# Patient Record
Sex: FEMALE | Race: WHITE | Employment: UNEMPLOYED | URBAN - NONMETROPOLITAN AREA
[De-identification: names, ages, dates, MRNs, and addresses within clinical notes are randomized per-mention and may not be internally consistent; named-entity substitution may affect disease eponyms.]

---

## 2017-01-08 ENCOUNTER — HOSPITAL ENCOUNTER (EMERGENCY)
Facility: HOSPITAL | Age: 22
Discharge: HOME | End: 2017-01-09
Admitting: INTERNAL MEDICINE
Payer: COMMERCIAL

## 2017-01-08 VITALS
SYSTOLIC BLOOD PRESSURE: 113 MMHG | TEMPERATURE: 98.4 F | OXYGEN SATURATION: 99 % | HEIGHT: 62 IN | DIASTOLIC BLOOD PRESSURE: 65 MMHG | RESPIRATION RATE: 16 BRPM

## 2017-01-08 DIAGNOSIS — L27.0 ALLERGIC DRUG RASH: ICD-10-CM

## 2017-01-08 PROCEDURE — 99283 EMERGENCY DEPT VISIT LOW MDM: CPT | Performed by: INTERNAL MEDICINE

## 2017-01-08 PROCEDURE — 99283 EMERGENCY DEPT VISIT LOW MDM: CPT

## 2017-01-08 NOTE — ED AVS SNAPSHOT
[facility]    [address] [address]    Phone:  [phone]                                       Gerald Evans   MRN: [id number]    Department:  [facility]   Date of Visit:  1/8/2017           After Visit Summary Signature Page     I have received my discharge instructions, and my questions have been answered. I have discussed any challenges I see with this plan with the nurse or doctor.    ..........................................................................................................................................  Patient/Patient Representative Signature      ..........................................................................................................................................  Patient Representative Print Name and Relationship to Patient    ..................................................               ................................................  Date                                            Time    ..........................................................................................................................................  Reviewed by Signature/Title    ...................................................              ..............................................  Date                                                            Time

## 2017-01-08 NOTE — ED AVS SNAPSHOT
[facility]    [address] [place] [address]    [facility] [place] [address]    Phone:  [phone]                                       Gerald Evans   MRN: [id number]    Department:  [facility]   Date of Visit:  1/8/2017           Patient Information     Date Of Birth          1995        Your diagnoses for this visit were:     Allergic drug rash       Follow-up Information     Follow up with Jennie Carter NP.    Specialty:  Nurse Practitioner    Contact information:    [facility]  [address] [facility] [address]  [phone]          Discharge Instructions           Self-Care for Skin Rashes  When your skin reacts to a substance your body is sensitive to, it can cause a rash. You can treat most rashes at home by keeping the skin clean and dry. Many rashes may get better on their own within 2 to 3 days. You may need medical attention if your rash itches, drains, or hurts, particularly if the rash is getting worse.  What can cause a skin rash?    Sun poisoning, caused by too much exposure to the sun    An irritant or allergic reaction to a certain type of food, plant, or chemical, such as  shellfish, poison ivy, and or cleaning products    An infection caused by a fungus (ringworm), virus (chickenpox), or bacteria (strep)    Bites or infestation caused by insects or pests, such as ticks, lice, or mites    Dry skin, which is often seen during the winter months and in older people  How can I control itching and skin damage?    Take soothing  lukewarm baths in a colloidal oatmeal product. You can buy this at the drugstore.    Do your best not to scratch. Clip fingernails short, especially in young children, to reduce skin damage if scratching does occur.    Use moisturizing skin lotion instead of scratching your dry skin.    Use sunscreen whenever going out into direct sun.    Use only mild cleansing agents whenever possible.    Wash with mild, nonirritating soap and warm  water.    Wear clothing that breathes, such as cotton shirts or canvas shoes.    If fluid is seeping from the rash, cover it loosely with clean gauze to absorb the discharge.    Many rashes are contagious. Prevent the rash from spreading to others by washing your hands often before or after touching others with any skin rash.  Use medicine    Antihistamines such as diphenhydramine can help control itching. But use with caution because they can make you drowsy.    Using over-the-counter hydrocortisone cream on small rashes may help reduce swelling and itching    Most over-the-counter antifungal medicines can treat athlete s foot and many other fungal infections of the skin.  Check with your healthcare provider  Call your healthcare provider if:    You were told that you have a fungal infection on your skin to make sure you have the correct type of medicine    You have questions or concerns about medicines or their side effects.      Call 911  Call 911 if either of these occur:    Your tongue or lips start to swell    You have difficulty breathing      Call your healthcare provider  Call your healthcare provider if any of these occur:    Temperature  of more than 101.0 F (38.3 C), or as directed    Sore throat, a cough, or unusual fatigue    Red, oozy, or painful rash gets worse. These are signs of infection.    Rash covers your face, genitals, or most of your body    Crusty sores or red rings that begin to spread    You were exposed to someone who has a contagious rash, such as scabies or lice.    Red bull s-eye rash with a white center (a sign of Lyme disease)    You were told that you have resistant bacteria (MRSA) on your skin.     8561-3932 [employer]. [address] [place] [address], [place] [address]. All rights reserved. This information is not intended as a substitute for professional medical care. Always follow your healthcare professional's instructions.               Review of your medicines      Our  "records show that you are taking the medicines listed below. If these are incorrect, please call your family doctor or clinic.        Dose / Directions Last dose taken    BUSPAR PO   Dose:  5 mg        Take 5 mg by mouth 2 times daily   Refills:  0        IBUPROFEN PO        Refills:  0        TYLENOL PO        Refills:  0                Orders Needing Specimen Collection     None      Pending Results     No orders found for last 2 day(s).            Pending Culture Results     No orders found for last 2 day(s).            Thank you for choosing [facility]       Thank you for choosing [facility] for your care. Our goal is always to provide you with excellent care. Hearing back from our patients is one way we can continue to improve our services. Please take a few minutes to complete the written survey that you may receive in the mail after you visit with us. Thank you!        [employer] Information     [employer] lets you send messages to your doctor, view your test results, renew your prescriptions, schedule appointments and more. To sign up, go to [email or web address] . Click on \"Log in\" on the left side of the screen, which will take you to the Welcome page. Then click on \"Sign up Now\" on the right side of the page.     You will be asked to enter the access code listed below, as well as some personal information. Please follow the directions to create your username and password.     Your access code is: [email or web address]  Expires: 3/31/2017  3:25 AM     Your access code will  in 90 days. If you need help or a new code, please call your [facility] clinic or [phone].        Care EveryWhere ID     This is your Care EveryWhere ID. This could be used by other organizations to access your [facility] medical records  [id number]        After Visit Summary       This is your record. Keep this with you and show to your community pharmacist(s) and doctor(s) at your next visit.                  "

## 2017-01-09 PROCEDURE — 25000130 H RX MED GY IP 250 OP 259 PS 637: Performed by: INTERNAL MEDICINE

## 2017-01-09 PROCEDURE — 25000132 ZZH RX MED GY IP 250 OP 250 PS 637: Performed by: INTERNAL MEDICINE

## 2017-01-09 RX ORDER — FAMOTIDINE 20 MG/1
20 TABLET, FILM COATED ORAL ONCE
Status: COMPLETED | OUTPATIENT
Start: 2017-01-09 | End: 2017-01-09

## 2017-01-09 RX ORDER — DIPHENHYDRAMINE HCL 25 MG
50 CAPSULE ORAL ONCE
Status: COMPLETED | OUTPATIENT
Start: 2017-01-09 | End: 2017-01-09

## 2017-01-09 RX ADMIN — FAMOTIDINE 20 MG: 20 TABLET ORAL at 00:05

## 2017-01-09 RX ADMIN — DIPHENHYDRAMINE HYDROCHLORIDE 50 MG: 25 CAPSULE ORAL at 00:05

## 2017-01-09 NOTE — ED NOTES
Pt states she was started on buspar on Wednesday for anxiety issues. Stopped th Buspar after Friday because of rash. Pt c/o feeling anxious still.

## 2017-01-09 NOTE — DISCHARGE INSTRUCTIONS
Self-Care for Skin Rashes  When your skin reacts to a substance your body is sensitive to, it can cause a rash. You can treat most rashes at home by keeping the skin clean and dry. Many rashes may get better on their own within 2 to 3 days. You may need medical attention if your rash itches, drains, or hurts, particularly if the rash is getting worse.  What can cause a skin rash?    Sun poisoning, caused by too much exposure to the sun    An irritant or allergic reaction to a certain type of food, plant, or chemical, such as  shellfish, poison ivy, and or cleaning products    An infection caused by a fungus (ringworm), virus (chickenpox), or bacteria (strep)    Bites or infestation caused by insects or pests, such as ticks, lice, or mites    Dry skin, which is often seen during the winter months and in older people  How can I control itching and skin damage?    Take soothing  lukewarm baths in a colloidal oatmeal product. You can buy this at the [employer].    Do your best not to scratch. Clip fingernails short, especially in young children, to reduce skin damage if scratching does occur.    Use moisturizing skin lotion instead of scratching your dry skin.    Use sunscreen whenever going out into direct sun.    Use only mild cleansing agents whenever possible.    Wash with mild, nonirritating soap and warm water.    Wear clothing that breathes, such as cotton shirts or canvas shoes.    If fluid is seeping from the rash, cover it loosely with clean gauze to absorb the discharge.    Many rashes are contagious. Prevent the rash from spreading to others by washing your hands often before or after touching others with any skin rash.  Use medicine    Antihistamines such as diphenhydramine can help control itching. But use with caution because they can make you drowsy.    Using over-the-counter hydrocortisone cream on small rashes may help reduce swelling and itching    Most over-the-counter antifungal medicines can  treat athlete s foot and many other fungal infections of the skin.  Check with your healthcare provider  Call your healthcare provider if:    You were told that you have a fungal infection on your skin to make sure you have the correct type of medicine    You have questions or concerns about medicines or their side effects.      Call 911  Call 911 if either of these occur:    Your tongue or lips start to swell    You have difficulty breathing      Call your healthcare provider  Call your healthcare provider if any of these occur:    Temperature  of more than 101.0 F (38.3 C), or as directed    Sore throat, a cough, or unusual fatigue    Red, oozy, or painful rash gets worse. These are signs of infection.    Rash covers your face, genitals, or most of your body    Crusty sores or red rings that begin to spread    You were exposed to someone who has a contagious rash, such as scabies or lice.    Red bull s-eye rash with a white center (a sign of Lyme disease)    You were told that you have resistant bacteria (MRSA) on your skin.     [id number] [employer]. [address] [place] [address], [place] [address]. All rights reserved. This information is not intended as a substitute for professional medical care. Always follow your healthcare professional's instructions.

## 2017-01-10 ASSESSMENT — ENCOUNTER SYMPTOMS
MYALGIAS: 0
VOICE CHANGE: 0
CONFUSION: 0
HEMATURIA: 0
COLOR CHANGE: 0
PALPITATIONS: 0
FEVER: 0
DYSURIA: 0
VOMITING: 0
NAUSEA: 0
NECK STIFFNESS: 0
CHILLS: 0
HEADACHES: 0
ABDOMINAL DISTENTION: 0
CHEST TIGHTNESS: 0
DIZZINESS: 0
NUMBNESS: 0
WOUND: 0
BLOOD IN STOOL: 0
WHEEZING: 0
ABDOMINAL PAIN: 0
FLANK PAIN: 0
DIAPHORESIS: 0
ANAL BLEEDING: 0
COUGH: 0
SHORTNESS OF BREATH: 0
BACK PAIN: 0
ARTHRALGIAS: 0
LIGHT-HEADEDNESS: 0
NECK PAIN: 0

## 2017-01-11 NOTE — ED PROVIDER NOTES
"  History     Chief Complaint   Patient presents with     Rash     Started Wednesday. Pt started on buspar on Wednesday, stopped taking buspar on 1/7/2016.     Anxiety     Patient is a 21 year old female presenting with rash. The history is provided by the patient.   Rash  Location:  Full body  Quality: itchiness    Onset quality:  Gradual  Timing:  Constant  Chronicity:  New  Associated symptoms: no abdominal pain, no fever, no headaches, no joint pain, no myalgias, no nausea, no shortness of breath, not vomiting and not wheezing        I have reviewed the Medications, Allergies, Past Medical and Surgical History, and Social History in the Epic system.    Review of Systems   Constitutional: Negative for fever, chills and diaphoresis.   HENT: Negative for voice change.    Eyes: Negative for visual disturbance.   Respiratory: Negative for cough, chest tightness, shortness of breath and wheezing.    Cardiovascular: Negative for chest pain, palpitations and leg swelling.   Gastrointestinal: Negative for nausea, vomiting, abdominal pain, blood in stool, abdominal distention and anal bleeding.   Genitourinary: Negative for dysuria, hematuria, flank pain and decreased urine volume.   Musculoskeletal: Negative for myalgias, back pain, arthralgias, gait problem, neck pain and neck stiffness.   Skin: Positive for rash. Negative for color change, pallor and wound.   Neurological: Negative for dizziness, syncope, light-headedness, numbness and headaches.   Psychiatric/Behavioral: Negative for suicidal ideas and confusion.       Physical Exam   BP: 113/65 mmHg  Heart Rate: 88  Temp: 98.4  F (36.9  C)  Resp: 16  Height: 157.5 cm (5' 2\")  SpO2: 99 %  Physical Exam   Constitutional: She is oriented to person, place, and time. She appears well-developed and well-nourished.   HENT:   Head: Normocephalic and atraumatic.   Mouth/Throat: No oropharyngeal exudate.   Eyes: Conjunctivae are normal. Pupils are equal, round, and reactive to " light.   Neck: Normal range of motion. Neck supple. No JVD present. No tracheal deviation present. No thyromegaly present.   Cardiovascular: Normal rate, regular rhythm, normal heart sounds and intact distal pulses.  Exam reveals no gallop and no friction rub.    No murmur heard.  Pulmonary/Chest: Effort normal and breath sounds normal. No stridor. No respiratory distress. She has no wheezes. She has no rales. She exhibits no tenderness.   Abdominal: Soft. Bowel sounds are normal. She exhibits no distension and no mass. There is no tenderness. There is no rebound and no guarding.   Musculoskeletal: Normal range of motion. She exhibits no edema or tenderness.   Lymphadenopathy:     She has no cervical adenopathy.   Neurological: She is alert and oriented to person, place, and time.   Skin: Skin is warm and dry. No rash noted. Rash is not macular. No erythema. No pallor.   Psychiatric: Her behavior is normal.   Nursing note and vitals reviewed.      ED Course   Procedures               Labs Ordered and Resulted from Time of ED Arrival Up to the Time of Departure from the ED - No data to display    Assessments & Plan (with Medical Decision Making)   Scattered macular rash after buspar   Already resolved  Pt advised stop taking buspar, fu with PCP  I have reviewed the nursing notes.    I have reviewed the findings, diagnosis, plan and need for follow up with the patient.    Discharge Medication List as of 1/9/2017 12:16 AM          Final diagnoses:   Allergic drug rash       1/8/2017   [facility]      Aj Mendes MD  01/10/17 9924

## 2017-01-31 ENCOUNTER — HISTORY (OUTPATIENT)
Dept: EMERGENCY MEDICINE | Facility: OTHER | Age: 22
End: 2017-01-31

## 2017-05-25 ENCOUNTER — HOSPITAL ENCOUNTER (EMERGENCY)
Facility: HOSPITAL | Age: 22
Discharge: HOME | End: 2017-05-25
Attending: EMERGENCY MEDICINE | Admitting: EMERGENCY MEDICINE
Payer: COMMERCIAL

## 2017-05-25 VITALS
TEMPERATURE: 98.3 F | SYSTOLIC BLOOD PRESSURE: 111 MMHG | HEART RATE: 85 BPM | DIASTOLIC BLOOD PRESSURE: 73 MMHG | OXYGEN SATURATION: 98 % | RESPIRATION RATE: 16 BRPM

## 2017-05-25 DIAGNOSIS — R10.9 CHRONIC ABDOMINAL PAIN: ICD-10-CM

## 2017-05-25 DIAGNOSIS — G89.29 CHRONIC ABDOMINAL PAIN: ICD-10-CM

## 2017-05-25 LAB
ALBUMIN SERPL-MCNC: 3.8 G/DL (ref 3.4–5)
ALBUMIN UR-MCNC: NEGATIVE MG/DL
ALP SERPL-CCNC: 78 U/L (ref 40–150)
ALT SERPL W P-5'-P-CCNC: 12 U/L (ref 0–50)
ANION GAP SERPL CALCULATED.3IONS-SCNC: 7 MMOL/L (ref 3–14)
APPEARANCE UR: CLEAR
AST SERPL W P-5'-P-CCNC: 7 U/L (ref 0–45)
BASOPHILS # BLD AUTO: 0 10E9/L (ref 0–0.2)
BASOPHILS NFR BLD AUTO: 0.2 %
BILIRUB SERPL-MCNC: 0.2 MG/DL (ref 0.2–1.3)
BILIRUB UR QL STRIP: NEGATIVE
BUN SERPL-MCNC: 11 MG/DL (ref 7–30)
CALCIUM SERPL-MCNC: 8.7 MG/DL (ref 8.5–10.1)
CHLORIDE SERPL-SCNC: 107 MMOL/L (ref 94–109)
CO2 SERPL-SCNC: 27 MMOL/L (ref 20–32)
COLOR UR AUTO: YELLOW
CREAT SERPL-MCNC: 0.68 MG/DL (ref 0.52–1.04)
DIFFERENTIAL METHOD BLD: NORMAL
EOSINOPHIL # BLD AUTO: 0.1 10E9/L (ref 0–0.7)
EOSINOPHIL NFR BLD AUTO: 0.6 %
ERYTHROCYTE [DISTWIDTH] IN BLOOD BY AUTOMATED COUNT: 13.5 % (ref 10–15)
GFR SERPL CREATININE-BSD FRML MDRD: NORMAL ML/MIN/1.7M2
GLUCOSE SERPL-MCNC: 87 MG/DL (ref 70–99)
GLUCOSE UR STRIP-MCNC: NEGATIVE MG/DL
HCT VFR BLD AUTO: 37 % (ref 35–47)
HGB BLD-MCNC: 12.2 G/DL (ref 11.7–15.7)
HGB UR QL STRIP: NEGATIVE
IMM GRANULOCYTES # BLD: 0 10E9/L (ref 0–0.4)
IMM GRANULOCYTES NFR BLD: 0.2 %
KETONES UR STRIP-MCNC: NEGATIVE MG/DL
LEUKOCYTE ESTERASE UR QL STRIP: NEGATIVE
LYMPHOCYTES # BLD AUTO: 2.8 10E9/L (ref 0.8–5.3)
LYMPHOCYTES NFR BLD AUTO: 34 %
MCH RBC QN AUTO: 27.8 PG (ref 26.5–33)
MCHC RBC AUTO-ENTMCNC: 33 G/DL (ref 31.5–36.5)
MCV RBC AUTO: 84 FL (ref 78–100)
MONOCYTES # BLD AUTO: 0.5 10E9/L (ref 0–1.3)
MONOCYTES NFR BLD AUTO: 6.4 %
NEUTROPHILS # BLD AUTO: 4.8 10E9/L (ref 1.6–8.3)
NEUTROPHILS NFR BLD AUTO: 58.6 %
NITRATE UR QL: NEGATIVE
NRBC # BLD AUTO: 0 10*3/UL
NRBC BLD AUTO-RTO: 0 /100
PH UR STRIP: 6 PH (ref 4.7–8)
PLATELET # BLD AUTO: 293 10E9/L (ref 150–450)
POTASSIUM SERPL-SCNC: 3.5 MMOL/L (ref 3.4–5.3)
PROT SERPL-MCNC: 7.1 G/DL (ref 6.8–8.8)
RBC # BLD AUTO: 4.39 10E12/L (ref 3.8–5.2)
SODIUM SERPL-SCNC: 141 MMOL/L (ref 133–144)
SP GR UR STRIP: 1.02 (ref 1–1.03)
URN SPEC COLLECT METH UR: NORMAL
UROBILINOGEN UR STRIP-MCNC: NORMAL MG/DL (ref 0–2)
WBC # BLD AUTO: 8.1 10E9/L (ref 4–11)

## 2017-05-25 PROCEDURE — 80053 COMPREHEN METABOLIC PANEL: CPT | Performed by: EMERGENCY MEDICINE

## 2017-05-25 PROCEDURE — 99284 EMERGENCY DEPT VISIT MOD MDM: CPT | Performed by: EMERGENCY MEDICINE

## 2017-05-25 PROCEDURE — 99283 EMERGENCY DEPT VISIT LOW MDM: CPT

## 2017-05-25 PROCEDURE — 36415 COLL VENOUS BLD VENIPUNCTURE: CPT | Performed by: EMERGENCY MEDICINE

## 2017-05-25 PROCEDURE — 81003 URINALYSIS AUTO W/O SCOPE: CPT | Performed by: EMERGENCY MEDICINE

## 2017-05-25 PROCEDURE — 85025 COMPLETE CBC W/AUTO DIFF WBC: CPT | Performed by: EMERGENCY MEDICINE

## 2017-05-25 RX ORDER — DICYCLOMINE HCL 20 MG
20 TABLET ORAL 2 TIMES DAILY
Qty: 20 TABLET | Refills: 0 | Status: SHIPPED | OUTPATIENT
Start: 2017-05-25 | End: 2017-06-04

## 2017-05-25 NOTE — ED PROVIDER NOTES
History     Chief Complaint   Patient presents with     Abdominal Pain     belly pain on/of for 5 years, worse tonight     The history is provided by the patient.     Gerald Evans is a 21 year old female who presents to the emergency department accompanied by the boyfriend.  She has had recurrent abdominal pains on and off over the last 5 years.  The pains are generalized, especially worse after eating greasy food.  Sometimes associated with diarrhea.  Denies any vomiting, fever, weight loss or night sweats.  The abdominal discomfort is also sometimes present when patient is under a lot of stress.  Patient suspects that she has irritable bowel syndrome.  She denies any family history of inflammatory bowel disease.    I have reviewed the Medications, Allergies, Past Medical and Surgical History, and Social History in the Epic system.    Review of Systems   All other systems reviewed and are negative.      Physical Exam   BP: 111/73  Pulse: 85  Temp: 98.3  F (36.8  C)  Resp: 16  SpO2: 98 %  Physical Exam   Constitutional: She is oriented to person, place, and time. She appears well-developed and well-nourished. No distress.   HENT:   Head: Atraumatic.   Mouth/Throat: Oropharynx is clear and moist. No oropharyngeal exudate.   Eyes: Pupils are equal, round, and reactive to light. No scleral icterus.   Neck: Normal range of motion. Neck supple.   Cardiovascular: Normal rate, regular rhythm, normal heart sounds and intact distal pulses.    Pulmonary/Chest: Breath sounds normal. No respiratory distress. She has no wheezes. She has no rales.   Abdominal: Soft. Bowel sounds are normal. She exhibits no mass. There is no tenderness. There is no rebound and no guarding.   Musculoskeletal: She exhibits no edema or tenderness.   Neurological: She is alert and oriented to person, place, and time.   Skin: Skin is warm. No rash noted. She is not diaphoretic.   Nursing note and vitals reviewed.      ED Course   Normal CBC, CMP  and urinalysis.  Stable ED course.    ED Course     Procedures         Labs Ordered and Resulted from Time of ED Arrival Up to the Time of Departure from the ED   UA MACROSCOPIC WITH REFLEX TO MICRO AND CULTURE   CBC WITH PLATELETS DIFFERENTIAL   COMPREHENSIVE METABOLIC PANEL       Assessments & Plan (with Medical Decision Making)     I have reviewed the nursing notes.    I have reviewed the findings, diagnosis, plan and need for follow up with the patient.    New Prescriptions    DICYCLOMINE (BENTYL) 20 MG TABLET    Take 1 tablet (20 mg) by mouth 2 times daily for 10 days       Final diagnoses:   Chronic abdominal pain   Long history of recurrent generalized abdominal pain that is worsened with greasy food.  Today's evaluation at the ED showed normal abdominal exam, normal CBC, CMP and urinalysis.  Patient discharged home.  Advised follow-up with PCP.  May need EGD and colonoscopy to rule out inflammatory bowel disease.  Most likely patient has irritable bowel disease but this is a diagnosis of exclusion.  Advised patient that the best way to proceed from now would be to do EGD and colonoscopy.  Patient is not really interested in endoscopy.  She will discuss this with her PCP.    5/25/2017   [facility]     Fredy Oneil MD  05/25/17 0212       Fredy Oneil MD  05/25/17 0213

## 2017-05-25 NOTE — ED AVS SNAPSHOT
[facility]    [address] [address]    Phone:  [phone]                                       Gerald Evans   MRN: [id number]    Department:  [facility]   Date of Visit:  5/25/2017           Patient Information     Date Of Birth          1995        Your diagnoses for this visit were:     Chronic abdominal pain        You were seen by Fredy Oneil MD.      Follow-up Information     Schedule an appointment as soon as possible for a visit with Jennie Carter NP.    Specialty:  Nurse Practitioner    Why:  Re-evaluation    Contact information:    [facility]  [address] [facility] [address] [phone]          Discharge Instructions         Abdominal Pain  Abdominal pain is pain in the stomach or intestinal area. Everyone has this pain from time to time. In many cases it goes away on its own. But abdominal pain can sometimes be due to a serious problem, such as appendicitis. So it s important to know when to seek help.  Causes of abdominal pain  There are many possible causes of abdominal pain. Common causes in adults include:    Constipation, diarrhea, or gas    GERD (gastroesophageal reflux disease) movement of stomach acid into the esophagus, also known as acid reflux or heartburn    Peptic ulcer (a sore in the lining of the stomach or small intestine)    Inflammation of the gallbladder, liver, or pancreas    Gallstones or kidney stones    Appendicitis     Obstruction of the intestines     Hernia (bulging of an internal organ through a muscle or other tissue)    Urinary tract infections    In women, menstrual cramps, fibroids, or endometriosis of the uterus    Inflammation or infection of the intestines  Diagnosing the cause of abdominal pain  Your health care provider will examine you to help find the cause of your pain. If needed, tests will be ordered. Because abdominal pain has so many possible causes, it can be hard to  discover the reason for the pain. Giving details about your pain can help. Be ready to tell your health care provider where and when you feel the pain and what makes it better or worse. Also mention whether you have other symptoms such as fever, tiredness, nausea, vomiting, or changes in bathroom habits.  Treating abdominal pain  Certain causes of pain, such as appendicitis or a bowel obstruction, need emergency treatment. Other problems can be treated with rest, fluids, or medications. Your health care provider can give you specific instructions for treatment or self-care based on the cause of your pain.  If you have vomiting or diarrhea, sip water or other clear fluids. When you are ready to eat solid foods again, start with small amounts of easy-to-digest, low-fat foods, such as applesauce, toast, or crackers.   When to call the doctor  Call 911 or go to the hospital right away if you:    Can t pass stool and are vomiting    Are vomiting blood or have black, tarry diarrhea    Also have chest, neck, or shoulder pain    Feel like you are about to pass out    Have pain in your shoulder blades with nausea    Have sudden, excruciating abdominal pain    Have new, severe pain unlike any you have felt before    Have a belly that is rigid, hard, and tender to touch  Call your doctor if you have:    Pain for more than 5 days    Bloating for more than 2 days    Diarrhea for more than 5 days    Fever of 101 F (38.3 C) or higher    Pain that continues to worsen    Unexplained weight loss    Continued lack of appetite    Blood in the stool  How to prevent abdominal pain  Here are some tips to help prevent abdominal pain:    Eat smaller amounts of food at one time.    Avoid greasy, fried, or other high-fat foods.    Avoid foods that give you gas.    Exercise regularly.    Drink plenty of fluids.  To help prevent symptoms of gastroesophageal reflux disease (GERD):    Quit smoking.    Reduce alcohol and certain foods that  increase stomach acid.     Lose excess weight.    Finish eating at least 2 hours before you go to bed or lie down.    Elevate the head of your bed.    9182-7772 [employer]. [address] [place] [address], [place] [address]. All rights reserved. This information is not intended as a substitute for professional medical care. Always follow your healthcare professional's instructions.             Review of your medicines      START taking        Dose / Directions Last dose taken    dicyclomine 20 MG tablet   Commonly known as:  BENTYL   Dose:  20 mg   Quantity:  20 tablet        Take 1 tablet (20 mg) by mouth 2 times daily for 10 days   Refills:  0          Our records show that you are taking the medicines listed below. If these are incorrect, please call your family doctor or clinic.        Dose / Directions Last dose taken    IBUPROFEN PO        Refills:  0        TYLENOL PO        Refills:  0                Prescriptions were sent or printed at these locations (1 Prescription)                   [facility]  [address]   [address] [facility] [place] [address]    Telephone:  [phone]   Fax:  [phone]   Hours:                  E-Prescribed (1 of 1)         dicyclomine (BENTYL) 20 MG tablet                Procedures and tests performed during your visit     CBC with platelets differential    Comprehensive metabolic panel    UA reflex to Microscopic and Culture      Orders Needing Specimen Collection     None      Pending Results     Date and Time Order Name Status Description    5/25/2017 0133 Comprehensive metabolic panel In process             Pending Culture Results     No orders found from 5/23/2017 to 5/26/2017.            Thank you for choosing [facility]       Thank you for choosing [facility] for your care. Our goal is always to provide you with excellent care. Hearing back from our patients is one way we can continue to improve our services. Please take a few minutes to  "complete the written survey that you may receive in the mail after you visit with us. Thank you!        [facility] Information     [employer] lets you send messages to your doctor, view your test results, renew your prescriptions, schedule appointments and more. To sign up, go to [email or web address] . Click on \"Log in\" on the left side of the screen, which will take you to the Welcome page. Then click on \"Sign up Now\" on the right side of the page.     You will be asked to enter the access code listed below, as well as some personal information. Please follow the directions to create your username and password.     Your access code is: [email or web address]  Expires: 2017  2:11 AM     Your access code will  in 90 days. If you need help or a new code, please call your [facility] clinic or [phone].        Care EveryWhere ID     This is your Care EveryWhere ID. This could be used by other organizations to access your [facility] medical records  [id number]        After Visit Summary       This is your record. Keep this with you and show to your community pharmacist(s) and doctor(s) at your next visit.                  "

## 2017-05-25 NOTE — ED AVS SNAPSHOT
[facility]    [address] [address]    Phone:  [phone]                                       Gerald Evans   MRN: [id number]    Department:  [facility]   Date of Visit:  5/25/2017           After Visit Summary Signature Page     I have received my discharge instructions, and my questions have been answered. I have discussed any challenges I see with this plan with the nurse or doctor.    ..........................................................................................................................................  Patient/Patient Representative Signature      ..........................................................................................................................................  Patient Representative Print Name and Relationship to Patient    ..................................................               ................................................  Date                                            Time    ..........................................................................................................................................  Reviewed by Signature/Title    ...................................................              ..............................................  Date                                                            Time

## 2017-05-25 NOTE — ED NOTES
Discharge instructions reviewed with patient, and Rx sent to [facility]. Pt verbalized understanding. Pt ambulated with a steady gait to the exit.

## 2017-05-25 NOTE — DISCHARGE INSTRUCTIONS
Abdominal Pain  Abdominal pain is pain in the stomach or intestinal area. Everyone has this pain from time to time. In many cases it goes away on its own. But abdominal pain can sometimes be due to a serious problem, such as appendicitis. So it s important to know when to seek help.  Causes of abdominal pain  There are many possible causes of abdominal pain. Common causes in adults include:    Constipation, diarrhea, or gas    GERD (gastroesophageal reflux disease) movement of stomach acid into the esophagus, also known as acid reflux or heartburn    Peptic ulcer (a sore in the lining of the stomach or small intestine)    Inflammation of the gallbladder, liver, or pancreas    Gallstones or kidney stones    Appendicitis     Obstruction of the intestines     Hernia (bulging of an internal organ through a muscle or other tissue)    Urinary tract infections    In women, menstrual cramps, fibroids, or endometriosis of the uterus    Inflammation or infection of the intestines  Diagnosing the cause of abdominal pain  Your health care provider will examine you to help find the cause of your pain. If needed, tests will be ordered. Because abdominal pain has so many possible causes, it can be hard to discover the reason for the pain. Giving details about your pain can help. Be ready to tell your health care provider where and when you feel the pain and what makes it better or worse. Also mention whether you have other symptoms such as fever, tiredness, nausea, vomiting, or changes in bathroom habits.  Treating abdominal pain  Certain causes of pain, such as appendicitis or a bowel obstruction, need emergency treatment. Other problems can be treated with rest, fluids, or medications. Your health care provider can give you specific instructions for treatment or self-care based on the cause of your pain.  If you have vomiting or diarrhea, sip water or other clear fluids. When you are ready to eat solid foods again, start with  small amounts of easy-to-digest, low-fat foods, such as applesauce, toast, or crackers.   When to call the doctor  Call 911 or go to the hospital right away if you:    Can t pass stool and are vomiting    Are vomiting blood or have black, tarry diarrhea    Also have chest, neck, or shoulder pain    Feel like you are about to pass out    Have pain in your shoulder blades with nausea    Have sudden, excruciating abdominal pain    Have new, severe pain unlike any you have felt before    Have a belly that is rigid, hard, and tender to touch  Call your doctor if you have:    Pain for more than 5 days    Bloating for more than 2 days    Diarrhea for more than 5 days    Fever of 101 F (38.3 C) or higher    Pain that continues to worsen    Unexplained weight loss    Continued lack of appetite    Blood in the stool  How to prevent abdominal pain  Here are some tips to help prevent abdominal pain:    Eat smaller amounts of food at one time.    Avoid greasy, fried, or other high-fat foods.    Avoid foods that give you gas.    Exercise regularly.    Drink plenty of fluids.  To help prevent symptoms of gastroesophageal reflux disease (GERD):    Quit smoking.    Reduce alcohol and certain foods that increase stomach acid.     Lose excess weight.    Finish eating at least 2 hours before you go to bed or lie down.    Elevate the head of your bed.    2754-9763 [employer]. [address] [place] [address], [place] [address]. All rights reserved. This information is not intended as a substitute for professional medical care. Always follow your healthcare professional's instructions.

## 2017-05-25 NOTE — ED NOTES
"Pt comes in with report of abdominal pain for past 5 years.  Pt states worse after eating \"greasy food\".  Pt states diarrhea frequently.  Pt declines IV placement.  Pt rates pain 4-8/10.    "

## 2017-06-18 ENCOUNTER — HOSPITAL ENCOUNTER (EMERGENCY)
Facility: HOSPITAL | Age: 22
Discharge: LEFT BEFORE DISCHARGE | End: 2017-06-18
Attending: FAMILY MEDICINE | Admitting: FAMILY MEDICINE
Payer: COMMERCIAL

## 2017-06-18 VITALS
RESPIRATION RATE: 18 BRPM | TEMPERATURE: 96.8 F | WEIGHT: 108 LBS | DIASTOLIC BLOOD PRESSURE: 71 MMHG | SYSTOLIC BLOOD PRESSURE: 106 MMHG | BODY MASS INDEX: 19.75 KG/M2 | OXYGEN SATURATION: 100 % | HEART RATE: 77 BPM

## 2017-06-18 DIAGNOSIS — R10.32 ABDOMINAL PAIN, LEFT LOWER QUADRANT: ICD-10-CM

## 2017-06-18 PROCEDURE — 99282 EMERGENCY DEPT VISIT SF MDM: CPT

## 2017-06-18 PROCEDURE — 99283 EMERGENCY DEPT VISIT LOW MDM: CPT | Performed by: FAMILY MEDICINE

## 2017-06-18 NOTE — ED NOTES
Pt presents to the ED alone with c/o 3 years of LLQ pain since having a tubal pregnancy.  States pain is intermittent and describes as pressure.  Rates a 2-3/10.  Denies any other s/sx.  Pt on the phone texting and snapchatting while RN speaking with/examining pt. Assessment is complete and call light is given.

## 2017-06-18 NOTE — ED PROVIDER NOTES
History     Chief Complaint   Patient presents with     Abdominal Pain     Tubal pregnancy 3 years ago and has intermittent pain where her tube was located.  States she wants it checked out. Sometimes feels like pressure.     HPI  Gerald Evans is a 22 year old female who ultimately leaves [facility] and refuses blood draws or diagnostics.  She evidently just did not want to stay here.     She has had abdominal pain, nearly constant, for 3 years in the area of her tubal pregnancy.  She had an operation in this area.  She has not spoken with her surgeon and she does not have a regular doctor.    She is not sure why she came in tonight;  She expressed anxiety over it.    No vaginal bleeding.  No fevers.  No vomiting or diarrhea.  No melena or hematochezia.  Not made better or worse with periods.    I have reviewed the Medications, Allergies, Past Medical and Surgical History, and Social History in the Epic system.    Allergies:   Allergies   Allergen Reactions     Adderall      Amphetamine Aspartate Other (See Comments)     Aggressive  Adderall       Amphetamine Sulfate Other (See Comments)     Aggressive  Adderall     Dextroamphetamine Other (See Comments)     Aggressive  Adderall     Buspar [Buspirone] Rash         No current facility-administered medications on file prior to encounter.   Current Outpatient Prescriptions on File Prior to Encounter:  Acetaminophen (TYLENOL PO)    IBUPROFEN PO        Patient Active Problem List   Diagnosis     Hidradenitis suppurativa     Smoker     Blood type, Rh negative     Loose stools     Grief reaction     Anxiety and depression     Mild intermittent asthma     Need for Tdap vaccination     Need for immunization against influenza     NO SHOW     Encounter for triage in pregnant patient     Heartburn     Subchorionic hemorrhage in first trimester     Short interval between pregnancies affecting pregnancy in first trimester, antepartum     Vaginal bleeding     Previous   "delivery, antepartum condition or complication     Previous  delivery, antepartum       Past Surgical History:   Procedure Laterality Date     BIOPSY OF SKIN LESION  2013      SECTION  [facility].     ENT SURGERY      adenoidectomy     ENT SURGERY      anesthesia for tooth work     LAPAROSCOPIC SALPINGECTOMY Left 2014    Procedure: LAPAROSCOPIC SALPINGECTOMY;  Surgeon: Leonel Bethea MD;  Location: [facility]       Social History   Substance Use Topics     Smoking status: Current Every Day Smoker     Packs/day: 0.50     Years: 7.00     Types: Cigarettes     Smokeless tobacco: Never Used     Alcohol use Yes      Comment: occas       Most Recent Immunizations   Administered Date(s) Administered     DTAP (<7y) 1997     HPVQuadrivalent 2007     Hepatitis B 1994     Influenza (IIV3) 10/05/2003     MMR 2003     MMR/V 2006     OPV 1997     Rhogam 12/15/2015     TDAP Vaccine (Boostrix) 2006     Tetramune (DtP/HIB) 1994     Varicella 1997       BMI: Estimated body mass index is 19.75 kg/(m^2) as calculated from the following:    Height as of 17: 1.575 m (5' 2\").    Weight as of this encounter: 49 kg (108 lb).      Review of Systemsshe has not been otherwise unwell    Physical Exam   BP: 106/71  Pulse: 77  Temp: 96.8  F (36  C)  Resp: 18  Weight: 49 kg (108 lb)  SpO2: 100 %  Physical Examwell woman.  Stable vitals.  Heart reg.  Lungs clear.  No CVAT.  abd soft, NT, ND, NABS.  No masses organomegaly or hernias.  Pelvic not done as the patient left.      ED Course     ED Course     Procedures         patient left and I cannot offer any treatment plans or a diagnosis.      Critical Care time:  none               Labs Ordered and Resulted from Time of ED Arrival Up to the Time of Departure from the ED - No data to display    Assessments & Plan (with Medical Decision Making)     I have reviewed the nursing notes.    I have reviewed the " findings, diagnosis, plan and need for follow up with the patient.       Discharge Medication List as of 6/18/2017  4:36 AM          Final diagnoses:   Abdominal pain, left lower quadrant       6/18/2017   [facility]     Shahbaz Oneil MD  06/18/17 8160

## 2017-06-18 NOTE — ED NOTES
Lab here to draw blood.  Pt call light on.  States to this nurse she just wants her discharge papers and wants to leave.  Explained it will be AMA.  Will update MD.

## 2017-06-18 NOTE — ED NOTES
Pt roomed - pt talking on phone while bringing to room.  Pt also answered phoned while in triage with this nurse and phone rang two other times.

## 2017-06-18 NOTE — ED NOTES
MD at bedside. Speaking with pt and exam done.  MD reports pt on phone/texting while examining pt.

## 2017-06-18 NOTE — DISCHARGE INSTRUCTIONS
*Abdominal Pain, Unknown Cause (Female)    The exact cause of your abdominal (stomach) pain is not certain. This does not mean that this is something to worry about, or the right tests were not done. Everyone likes to know the exact cause of the problem, but sometimes with abdominal pain, there is no clear-cut cause, and this could be a good thing. The good news is that your symptoms can be treated, and you will feel better.   Your condition does not seem serious now; however, sometimes the signs of a serious problem may take more time to appear. For this reason, it is important for you to watch for any new symptoms, problems, or worsening of your condition.  Over the next few days, the abdominal pain may come and go, or be continuous. Other common symptoms can include nausea and vomiting. Sometimes it can be difficult to tell if you feel nauseous, you may just feel bad and not associate that feeling with nausea. Constipation, diarrhea, and a fever may go along with the pain.  The pain may continue even if treated correctly over the following days. Depending on how things go, sometimes the cause can become clear and may require further or different treatment. Additional evaluations, medications, or tests may be needed.  Home care  Your health care provider may prescribe medications for pain, symptoms, or an infection.  Follow the health care provider's instructions for taking these medications.  General care    Rest until your next exam. No strenuous activities.    Try to find positions that ease discomfort. A small pillow placed on the abdomen may help relieve pain.    Something warm on your abdomen (such as a heating pad) may help, but be careful not to burn yourself.  Diet    Do not force yourself to eat, especially if having cramps, vomiting, or diarrhea.    Water is important so you do not get dehydrated. Soup may also be good. Sports drinks may also help, especially if they are not too acidic. Make sure you  don't drink sugary drinks as this can make things worse. Take liquids in small amounts. Do not guzzle them.    Caffeine sometimes makes the pain and cramping worse.    Avoid dairy products if you have vomiting or diarrhea.    Don't eat large amounts at a time. Wait a few minutes between bites.    Eat a diet low in fiber (called a low-residue diet). Foods allowed include refined breads, white rice, fruit and vegetable juices without pulp, tender meats. These foods will pass more easily through the intestine.    Avoid fried or fatty foods, dairy, alcohol and spicy foods until your symptoms go away.  Follow-up care  Follow up with your health care provider as instructed, or if your pain does not begin to improve in the next 24 hours.  When to seek medical care  Seek prompt medical care if any of the following occur:    Pain gets worse or moves to the right lower abdomen    New or worsening vomiting or diarrhea    Swelling of the abdomen    Unable to pass stool for more than three days    New fever over 101  F (38.3 C), or rising fever    Blood in vomit or bowel movements (dark red or black color)    Jaundice (yellow color of eyes and skin)    Weakness, dizziness    Chest, arm, back, neck or jaw pain    Unexpected vaginal bleeding or missed period  Call 911  Call emergency services if any of the following occur:    Trouble breathing    Confusion    Fainting or loss of consciousness    Rapid heart rate    Seizure    0362-7037 [facility], [address] [place] [address], [place] [address]. All rights reserved. This information is not intended as a substitute for professional medical care. Always follow your healthcare professional's instructions.

## 2017-06-18 NOTE — ED NOTES
Pt signed AMA form and left facility ambulating to ED doors with no s/sx of distress.  MD was updated prior to pt leaving facility at 0422.

## 2018-02-02 ENCOUNTER — HOSPITAL ENCOUNTER (EMERGENCY)
Facility: HOSPITAL | Age: 23
Discharge: HOME | End: 2018-02-02
Attending: PHYSICIAN ASSISTANT | Admitting: PHYSICIAN ASSISTANT
Payer: COMMERCIAL

## 2018-02-02 VITALS
DIASTOLIC BLOOD PRESSURE: 65 MMHG | OXYGEN SATURATION: 100 % | RESPIRATION RATE: 16 BRPM | TEMPERATURE: 98.6 F | SYSTOLIC BLOOD PRESSURE: 99 MMHG

## 2018-02-02 DIAGNOSIS — K02.9 DENTAL CARIES: ICD-10-CM

## 2018-02-02 DIAGNOSIS — K04.7 DENTAL ABSCESS: ICD-10-CM

## 2018-02-02 PROCEDURE — 99213 OFFICE O/P EST LOW 20 MIN: CPT | Performed by: PHYSICIAN ASSISTANT

## 2018-02-02 PROCEDURE — G0463 HOSPITAL OUTPT CLINIC VISIT: HCPCS

## 2018-02-02 RX ORDER — AMOXICILLIN 500 MG/1
1 CAPSULE ORAL ONCE
Status: DISCONTINUED | OUTPATIENT
Start: 2018-02-02 | End: 2018-02-03 | Stop reason: HOSPADM

## 2018-02-02 RX ORDER — AMOXICILLIN 500 MG/1
500 CAPSULE ORAL 3 TIMES DAILY
Qty: 30 CAPSULE | Refills: 0 | Status: SHIPPED | OUTPATIENT
Start: 2018-02-02 | End: 2018-02-12

## 2018-02-02 ASSESSMENT — ENCOUNTER SYMPTOMS
RESPIRATORY NEGATIVE: 1
NECK PAIN: 0
NAUSEA: 0
NEUROLOGICAL NEGATIVE: 1
NECK STIFFNESS: 0
PSYCHIATRIC NEGATIVE: 1
FEVER: 0
VOMITING: 0
FATIGUE: 0
CARDIOVASCULAR NEGATIVE: 1

## 2018-02-02 NOTE — ED AVS SNAPSHOT
[facility]    [address] [address]    Phone:  [phone]                                       Gerald Evans   MRN: [id number]    Department:  [facility]   Date of Visit:  2/2/2018           Patient Information     Date Of Birth          1995        Your diagnoses for this visit were:     Dental abscess Tooth # 31    Dental caries Tooth # 31       You were seen by Mindy Mcfarland PA.      Follow-up Information     Follow up with Jennie Carter NP.    Specialty:  Nurse Practitioner    Why:  If symptoms worsen, prior to your Oral Surgery    Contact information:    [facility]  [address] [facility] [address] [phone]          Follow up with [facility].    Specialty:  EMERGENCY MEDICINE    Why:  If further concerns     Contact information:    [address] [address] [phone]    Additional information:    From [facility]: Take [address]. Turn left at [address]. Turn left at the first stoplight on [address]. At the first stop sign, take a right onto [address]. Take a left into the parking lot and continue through until you reach the North enterance of the building.       From [place]: Take [address]. Take the [address] ramp towards [place]. Turn left onto [address]. Take a slight right onto [address]. Turn left at the first stoplight on [address]. At the first stop sign, take a right onto [address]. Take a left into the parking lot and continue through until you reach the North enterance of the building.       From [place]: Take [address]. Take a right at [address]. At the first stop sign, take a right onto [address]. Take a left into the parking lot and continue through until you reach the North enterance of the building.         Discharge Instructions       Tylenol 1000 mg every 6 hours.  "Maximum 4000 mg in 24 hours.    Motrin 800 mg every 8 hours. Maximum 2400 in 24 hours. Take with food.    Discharge References/Attachments     ABSCESS, DENTAL (ENGLISH)    DENTAL CAVITY (ENGLISH)         Review of your medicines      START taking        Dose / Directions Last dose taken    amoxicillin 500 MG capsule   Commonly known as:  AMOXIL   Dose:  500 mg   Quantity:  30 capsule        Take 1 capsule (500 mg) by mouth 3 times daily for 10 days   Refills:  0          Our records show that you are taking the medicines listed below. If these are incorrect, please call your family doctor or clinic.        Dose / Directions Last dose taken    IBUPROFEN PO        Refills:  0        TYLENOL PO   Dose:  1000 mg        Take 1,000 mg by mouth   Refills:  0                Prescriptions were sent or printed at these locations (1 Prescription)                   [facility] [address] [facility] [place], [place] - [address] AT [facility] [address]   [address] [place] [address]    Telephone:  [phone]   Fax:  [phone]   Hours:                  E-Prescribed (1 of 1)         amoxicillin (AMOXIL) 500 MG capsule                Orders Needing Specimen Collection     None      Pending Results     No orders found from 1/31/2018 to 2/3/2018.            Pending Culture Results     No orders found from 1/31/2018 to 2/3/2018.            Thank you for choosing [facility]       Thank you for choosing [facility] for your care. Our goal is always to provide you with excellent care. Hearing back from our patients is one way we can continue to improve our services. Please take a few minutes to complete the written survey that you may receive in the mail after you visit with us. Thank you!        [employer] Information     [employer] lets you send messages to your doctor, view your test results, renew your prescriptions, schedule appointments and more. To sign up, go to [email or web address] . Click on \"Log in\" on the left side of the " "screen, which will take you to the Welcome page. Then click on \"Sign up Now\" on the right side of the page.     You will be asked to enter the access code listed below, as well as some personal information. Please follow the directions to create your username and password.     Your access code is: [email or web address]  Expires: 5/3/2018 10:08 PM     Your access code will  in 90 days. If you need help or a new code, please call your [place] clinic or [phone].        Care EveryWhere ID     This is your Care EveryWhere ID. This could be used by other organizations to access your [place] medical records  [id number]        Equal Access to Services     FÁTIMA MADDEN : Margie Rubio, ce nur, balbir perera, nicole crockett. So [facility] [phone].    ATENCIÓN: Si habla español, tiene a martinez disposición servicios gratuitos de asistencia lingüística. Llame al [phone].    We comply with applicable federal civil rights laws and [place] laws. We do not discriminate on the basis of race, color, national origin, age, disability, sex, sexual orientation, or gender identity.            After Visit Summary       This is your record. Keep this with you and show to your community pharmacist(s) and doctor(s) at your next visit.                  "

## 2018-02-02 NOTE — ED AVS SNAPSHOT
[facility]    [address] [address]    Phone:  [phone]                                       Gerald Evans   MRN: [id number]    Department[facility]  [facility]   Date of Visit:  2/2/2018           After Visit Summary Signature Page     I have received my discharge instructions, and my questions have been answered. I have discussed any challenges I see with this plan with the nurse or doctor.    ..........................................................................................................................................  Patient/Patient Representative Signature      ..........................................................................................................................................  Patient Representative Print Name and Relationship to Patient    ..................................................               ................................................  Date                                            Time    ..........................................................................................................................................  Reviewed by Signature/Title    ...................................................              ..............................................  Date                                                            Time

## 2018-02-03 NOTE — ED PROVIDER NOTES
"  History     Chief Complaint   Patient presents with     Dental Pain     rt lower dental pain     The history is provided by the patient. No  was used.     Gerald Evans is a 22 year old female who has 2-3 days of right lower dental pain and swelling. States it started after she had an attempted extraction of this tooth. States the nerve was supposed to have been removed. She has an Oral Surgery consult she is waiting on getting an appt.  No fever. No n/v/d    Problem List:    Patient Active Problem List    Diagnosis Date Noted     Previous  delivery, antepartum condition or complication 2016     Priority: Medium     Previous  delivery, antepartum 2016     Priority: Medium     Subchorionic hemorrhage in first trimester 2016     Priority: Medium     Refusing rhogam       Short interval between pregnancies affecting pregnancy in first trimester, antepartum 2016     Priority: Medium     Vaginal bleeding 2016     Priority: Medium     Refusing rhogam despite counseling       Heartburn 2015     Priority: Medium     Encounter for triage in pregnant patient 2015     Priority: Medium     NO SHOW 10/26/2015     Priority: Medium     No showed Dr. Fam 10/16/15; 12/23/15; 16; 16; 16; 16; 8/3/16; 16  Letter sent regarding multiple missed appointments 12/23/15.         Need for immunization against influenza 09/15/2015     Priority: Medium     refuses       Anxiety and depression 2015     Priority: Medium     tools       Mild intermittent asthma 2015     Priority: Medium     Need for Tdap vaccination 2015     Priority: Medium     Refuses despite counseling       Grief reaction 2015     Priority: Medium     8/12/15: Mother \"on life-support in [place] with <10% chance survival\"       Loose stools 2015     Priority: Medium     Blood type, Rh negative 2015     Priority: Medium     Rhogam done " 12/15/15       Hidradenitis suppurativa 2013     Priority: Medium     Smoker 2013     Priority: Medium     Trying to quit          Past Medical History:    History reviewed. No pertinent past medical history.    Past Surgical History:    Past Surgical History:   Procedure Laterality Date     BIOPSY OF SKIN LESION  2013      SECTION  2016    [facility].     ENT SURGERY      adenoidectomy     ENT SURGERY      anesthesia for tooth work     LAPAROSCOPIC SALPINGECTOMY Left 2014    Procedure: LAPAROSCOPIC SALPINGECTOMY;  Surgeon: Leonel Bethea MD;  Location: [facility]       Family History:    Family History   Problem Relation Age of Onset     HEART DISEASE Mother      stent placement     C.A.D. Mother      DIABETES Paternal Grandmother      Hypertension Paternal Grandmother        Social History:  Marital Status:  Single [1]  Social History   Substance Use Topics     Smoking status: Current Every Day Smoker     Packs/day: 0.50     Years: 7.00     Types: Cigarettes     Smokeless tobacco: Never Used     Alcohol use Yes      Comment: occas        Medications:      amoxicillin (AMOXIL) 500 MG capsule   Acetaminophen (TYLENOL PO)   IBUPROFEN PO         Review of Systems   Constitutional: Negative for fatigue and fever.   HENT: Positive for dental problem. Negative for ear pain.    Respiratory: Negative.    Cardiovascular: Negative.    Gastrointestinal: Negative for nausea and vomiting.   Musculoskeletal: Negative for neck pain and neck stiffness.   Neurological: Negative.    Psychiatric/Behavioral: Negative.        Physical Exam   BP: 99/65  Heart Rate: 84  Temp: 98.6  F (37  C)  Resp: 16  SpO2: 100 %      Physical Exam   Constitutional: She is oriented to person, place, and time. She appears well-developed and well-nourished. No distress.   HENT:   Head: Normocephalic and atraumatic.   Tooth # 31 what is left appears to be black and worn/removed to the level of the gingiva. Gingiva has mild  erythema/edema.  Right cheek has minimal edema. No erythema   Neck: Normal range of motion. Neck supple.   Cardiovascular: Normal rate, regular rhythm and normal heart sounds.    Pulmonary/Chest: Effort normal and breath sounds normal.   Lymphadenopathy:     She has no cervical adenopathy.   Neurological: She is alert and oriented to person, place, and time.   Skin: She is not diaphoretic.   Psychiatric: She has a normal mood and affect.   Nursing note and vitals reviewed.      ED Course     ED Course     Procedures          Assessments & Plan (with Medical Decision Making)     I have reviewed the nursing notes.    I have reviewed the findings, diagnosis, plan and need for follow up with the patient.      Discharge Medication List as of 2/2/2018 10:10 PM      START taking these medications    Details   amoxicillin (AMOXIL) 500 MG capsule Take 1 capsule (500 mg) by mouth 3 times daily for 10 days, Disp-30 capsule, R-0, E-Prescribe             Final diagnoses:   Dental abscess - Tooth # 31   Dental caries - Tooth # 31         Patient given education sheet for each diagnosis.  Patient has no further questions.  Take medication as as directed.  Follow up with dental provider with first available appointment.  Follow up with PCP if symptoms increase prior to your dental appointment  Return to ED if fever/concerns develop  Mindy Mcfarland   Physician Assistant-C  2/2/2018  10:44 PM  [facility]  2/2/2018   [facility]     Mindy Mcfarland PA  02/02/18 [id number]

## 2018-02-03 NOTE — ED NOTES
Pt presents today alone for c/o right lower dental pain, says tylenol is just barely taking the edge off and is refusing a dental block.

## 2018-02-03 NOTE — DISCHARGE INSTRUCTIONS
Tylenol 1000 mg every 6 hours. Maximum 4000 mg in 24 hours.    Motrin 800 mg every 8 hours. Maximum 2400 in 24 hours. Take with food.

## 2018-02-11 ENCOUNTER — HOSPITAL ENCOUNTER (EMERGENCY)
Facility: HOSPITAL | Age: 23
Discharge: HOME | End: 2018-02-12
Attending: EMERGENCY MEDICINE | Admitting: EMERGENCY MEDICINE
Payer: COMMERCIAL

## 2018-02-11 VITALS — TEMPERATURE: 97.7 F | DIASTOLIC BLOOD PRESSURE: 71 MMHG | SYSTOLIC BLOOD PRESSURE: 111 MMHG | OXYGEN SATURATION: 100 %

## 2018-02-11 DIAGNOSIS — K08.89 PAIN, DENTAL: Primary | ICD-10-CM

## 2018-02-11 PROCEDURE — 99283 EMERGENCY DEPT VISIT LOW MDM: CPT

## 2018-02-11 PROCEDURE — 99283 EMERGENCY DEPT VISIT LOW MDM: CPT | Performed by: EMERGENCY MEDICINE

## 2018-02-11 NOTE — ED AVS SNAPSHOT
[facility]    [address] [address]    Phone:  [phone]                                       Gerald Evans   MRN: [id number]    Department[facility]  [facility]   Date of Visit:  2/11/2018           After Visit Summary Signature Page     I have received my discharge instructions, and my questions have been answered. I have discussed any challenges I see with this plan with the nurse or doctor.    ..........................................................................................................................................  Patient/Patient Representative Signature      ..........................................................................................................................................  Patient Representative Print Name and Relationship to Patient    ..................................................               ................................................  Date                                            Time    ..........................................................................................................................................  Reviewed by Signature/Title    ...................................................              ..............................................  Date                                                            Time

## 2018-02-11 NOTE — ED AVS SNAPSHOT
[facility]    [address] [address]    Phone:  [phone]                                       Gerald Evans   MRN: [id number]    Department[facility]   Date of Visit:  2/11/2018           Patient Information     Date Of Birth          1995        Your diagnoses for this visit were:     Pain, dental        You were seen by Fredy Oneil MD.      Follow-up Information     Follow up with Oral surgeon. Schedule an appointment as soon as possible for a visit in 1 day.      Discharge References/Attachments     DENTAL PAIN (ENGLISH)         Review of your medicines      START taking        Dose / Directions Last dose taken    acetaminophen-codeine 300-30 MG per tablet   Commonly known as:  TYLENOL #3   Dose:  1-2 tablet   Quantity:  6 tablet        Take 1-2 tablets by mouth every 4 hours as needed for moderate pain   Refills:  0          Our records show that you are taking the medicines listed below. If these are incorrect, please call your family doctor or clinic.        Dose / Directions Last dose taken    amoxicillin 500 MG capsule   Commonly known as:  AMOXIL   Dose:  500 mg   Quantity:  30 capsule        Take 1 capsule (500 mg) by mouth 3 times daily for 10 days   Refills:  0        IBUPROFEN PO   Dose:  800 mg        Take 800 mg by mouth as needed   Refills:  0        TYLENOL PO   Dose:  1000 mg        Take 1,000 mg by mouth   Refills:  0                Prescriptions were sent or printed at these locations (1 Prescription)                   [facility] [address]  [place] - [address] [facility] [address] [address] [facility] [address] [address]    Telephone:  [phone]   Fax:  [phone]   Hours:                  Printed at Department/Unit printer (1 of 1)         acetaminophen-codeine (TYLENOL #3) 300-30 MG per tablet                Orders Needing Specimen Collection     None      Pending Results     No orders found for last 3 day(s).            Pending Culture  "Results     No orders found for last 3 day(s).            Thank you for choosing [facility]       Thank you for choosing [facility] for your care. Our goal is always to provide you with excellent care. Hearing back from our patients is one way we can continue to improve our services. Please take a few minutes to complete the written survey that you may receive in the mail after you visit with us. Thank you!        [employer] [facility] Information     [employer] lets you send messages to your doctor, view your test results, renew your prescriptions, schedule appointments and more. To sign up, go to [email or web address] . Click on \"Log in\" on the left side of the screen, which will take you to the Welcome page. Then click on \"Sign up Now\" on the right side of the page.     You will be asked to enter the access code listed below, as well as some personal information. Please follow the directions to create your username and password.     Your access code is: [email or web address]  Expires: 5/3/2018 10:08 PM     Your access code will  in 90 days. If you need help or a new code, please call your [facility] clinic or [phone].        Care EveryWhere ID     This is your Care EveryWhere ID. This could be used by other organizations to access your [facility] medical records  [id number]        Equal Access to Services     FÁTIMA MADDEN AH: Margie Rubio, waaxda luqadaha, qaybta kaalmada trever, nicole barlow . So [facility] [phone].    ATENCIÓN: Si habla español, tiene a martinez disposición servicios gratuitos de asistencia lingüística. Llame al [phone].    We comply with applicable federal civil rights laws and [place] laws. We do not discriminate on the basis of race, color, national origin, age, disability, sex, sexual orientation, or gender identity.            After Visit Summary       This is your record. Keep this with you and show to your community pharmacist(s) and doctor(s) at your next " visit.

## 2018-02-12 RX ORDER — ACETAMINOPHEN AND CODEINE PHOSPHATE 300; 30 MG/1; MG/1
1-2 TABLET ORAL EVERY 4 HOURS PRN
Qty: 6 TABLET | Refills: 0 | Status: SHIPPED | OUTPATIENT
Start: 2018-02-12 | End: 2018-04-30

## 2018-02-12 RX ORDER — ACETAMINOPHEN AND CODEINE PHOSPHATE 300; 30 MG/1; MG/1
TABLET ORAL
Status: DISCONTINUED
Start: 2018-02-12 | End: 2018-02-12 | Stop reason: HOSPADM

## 2018-02-12 ASSESSMENT — ENCOUNTER SYMPTOMS
FACIAL SWELLING: 0
ABDOMINAL PAIN: 0
HEADACHES: 0
SHORTNESS OF BREATH: 0
TRISMUS: 0
NECK PAIN: 0
FEVER: 0
NECK SWELLING: 0

## 2018-02-12 NOTE — ED NOTES
Was seen a dentist for an attempted tooth extraction a little over a week ago. Dentist was unable to extract whole tooth. Pt was to follow-up with an oral surgeon, unable to get an appt.

## 2018-02-12 NOTE — ED PROVIDER NOTES
History     Chief Complaint   Patient presents with     Dental Pain     right lower tooth pain that is radiating into ear and jaw     Patient is a 22 year old female presenting with tooth pain.   Dental Pain   Location:  Lower  Lower teeth location:  32/RL 3rd molar  Severity:  Moderate  Onset quality:  Gradual  Duration:  3 days  Timing:  Intermittent  Progression:  Unchanged  Chronicity:  New  Context: recent dental surgery    Prior workup: failed extraction 1 week ago.  Relieved by:  Nothing  Worsened by:  Nothing  Ineffective treatments:  Acetaminophen and NSAIDs  Associated symptoms: no difficulty swallowing, no facial pain, no facial swelling, no fever, no gum swelling, no headaches, no neck pain, no neck swelling, no oral bleeding, no oral lesions and no trismus      Gerald Evans is a 22 year old female who presents to the emergency department with several days history of dental pain.  Patient had a dental procedure one week ago : a dentist attempted to extract the right lower molar but failed.  The crown was broken off and the root is still intact of the right lower molar.  She was referred to oral surgeon but so far has not been able to make an appointment.  She has tried over-the-counter Tylenol Motrin for the pain without much improvement.  Denies any facial pain, difficulty swallowing or breathing, facial swelling, bleeding, fever, headache, vomiting, unilateral body weakness.    Problem List:    Patient Active Problem List    Diagnosis Date Noted     Previous  delivery, antepartum condition or complication 2016     Priority: Medium     Previous  delivery, antepartum 2016     Priority: Medium     Subchorionic hemorrhage in first trimester 2016     Priority: Medium     Refusing rhogam       Short interval between pregnancies affecting pregnancy in first trimester, antepartum 2016     Priority: Medium     Vaginal bleeding 2016     Priority: Medium      "Refusing rhogam despite counseling       Heartburn 2015     Priority: Medium     Encounter for triage in pregnant patient 2015     Priority: Medium     NO SHOW 10/26/2015     Priority: Medium     No showed Dr. Fam 10/16/15; 12/23/15; 16; 16; 16; 16; 8/3/16; 16  Letter sent regarding multiple missed appointments 12/23/15.         Need for immunization against influenza 09/15/2015     Priority: Medium     refuses       Anxiety and depression 2015     Priority: Medium     tools       Mild intermittent asthma 2015     Priority: Medium     Need for Tdap vaccination 2015     Priority: Medium     Refuses despite counseling       Grief reaction 2015     Priority: Medium     8/12/15: Mother \"on life-support in [place] with <10% chance survival\"       Loose stools 2015     Priority: Medium     Blood type, Rh negative 2015     Priority: Medium     Rhogam done 12/15/15       Hidradenitis suppurativa 2013     Priority: Medium     Smoker 2013     Priority: Medium     Trying to quit          Past Medical History:    No past medical history on file.    Past Surgical History:    Past Surgical History:   Procedure Laterality Date     BIOPSY OF SKIN LESION  2013      SECTION  2016    [facility].     ENT SURGERY      adenoidectomy     ENT SURGERY      anesthesia for tooth work     LAPAROSCOPIC SALPINGECTOMY Left 2014    Procedure: LAPAROSCOPIC SALPINGECTOMY;  Surgeon: Leonel Bethea MD;  Location: [facility] OR       Family History:    Family History   Problem Relation Age of Onset     HEART DISEASE Mother      stent placement     C.A.D. Mother      DIABETES Paternal Grandmother      Hypertension Paternal Grandmother        Social History:  Marital Status:  Single [1]  Social History   Substance Use Topics     Smoking status: Current Every Day Smoker     Packs/day: 0.50     Years: 7.00     Types: Cigarettes     Smokeless tobacco: Never " Used     Alcohol use Yes      Comment: occas        Medications:      acetaminophen-codeine (TYLENOL #3) 300-30 MG per tablet   amoxicillin (AMOXIL) 500 MG capsule   Acetaminophen (TYLENOL PO)   IBUPROFEN PO         Review of Systems   Constitutional: Negative for fever.   HENT: Positive for dental problem. Negative for facial swelling and mouth sores.    Respiratory: Negative for shortness of breath.    Cardiovascular: Negative for chest pain.   Gastrointestinal: Negative for abdominal pain.   Musculoskeletal: Negative for neck pain.   Neurological: Negative for headaches.   All other systems reviewed and are negative.      Physical Exam   BP: 111/71  Heart Rate: 85  Temp: 97.7  F (36.5  C)  SpO2: 100 %      Physical Exam   Constitutional: She is oriented to person, place, and time. She appears well-developed and well-nourished. No distress.   HENT:   Head: Atraumatic.   Mouth/Throat: Oropharynx is clear and moist. No oropharyngeal exudate.   Eyes: Pupils are equal, round, and reactive to light. No scleral icterus.   Cardiovascular: Normal rate, regular rhythm, normal heart sounds and intact distal pulses.    Pulmonary/Chest: Breath sounds normal. No respiratory distress. She has no wheezes.   Abdominal: Soft. Bowel sounds are normal. There is no tenderness.   Musculoskeletal: She exhibits no edema or tenderness.   Neurological: She is alert and oriented to person, place, and time.   Skin: Skin is warm. No rash noted. She is not diaphoretic.   Nursing note and vitals reviewed.      ED Course     ED Course     Procedures         Labs Ordered and Resulted from Time of ED Arrival Up to the Time of Departure from the ED - No data to display    Assessments & Plan (with Medical Decision Making)   Dental plan: Discharge home on Tylenol 3 as needed for pain.  Patient is already taking amoxicillin for infection- prescribed 2 days ago at the ED.  Advised to call an oral surgeon in the morning for an appointment as soon as  possible.  All questions answered.  Subsequently discharged home.  May return to ED if condition deteriorates.    I have reviewed the nursing notes.    I have reviewed the findings, diagnosis, plan and need for follow up with the patient.    Discharge Medication List as of 2/12/2018 12:08 AM      START taking these medications    Details   acetaminophen-codeine (TYLENOL #3) 300-30 MG per tablet Take 1-2 tablets by mouth every 4 hours as needed for moderate pain, Disp-6 tablet, R-0, Local Print             Final diagnoses:   Pain, dental       2/11/2018   [facility]     Fredy Oneil MD  02/12/18 0015

## 2018-02-22 ENCOUNTER — HOSPITAL ENCOUNTER (EMERGENCY)
Facility: HOSPITAL | Age: 23
Discharge: HOME | End: 2018-02-22
Attending: PHYSICIAN ASSISTANT | Admitting: PHYSICIAN ASSISTANT
Payer: COMMERCIAL

## 2018-02-22 VITALS
RESPIRATION RATE: 16 BRPM | TEMPERATURE: 97.4 F | HEART RATE: 98 BPM | DIASTOLIC BLOOD PRESSURE: 78 MMHG | SYSTOLIC BLOOD PRESSURE: 101 MMHG | OXYGEN SATURATION: 99 %

## 2018-02-22 DIAGNOSIS — L50.0 ALLERGIC URTICARIA: ICD-10-CM

## 2018-02-22 DIAGNOSIS — J06.9 UPPER RESPIRATORY TRACT INFECTION, UNSPECIFIED TYPE: ICD-10-CM

## 2018-02-22 PROCEDURE — 99213 OFFICE O/P EST LOW 20 MIN: CPT | Performed by: PHYSICIAN ASSISTANT

## 2018-02-22 PROCEDURE — 25000132 ZZH RX MED GY IP 250 OP 250 PS 637: Performed by: PHYSICIAN ASSISTANT

## 2018-02-22 PROCEDURE — G0463 HOSPITAL OUTPT CLINIC VISIT: HCPCS

## 2018-02-22 RX ORDER — CETIRIZINE HYDROCHLORIDE 10 MG/1
10 TABLET ORAL DAILY
Qty: 10 TABLET | Refills: 0 | Status: SHIPPED | OUTPATIENT
Start: 2018-02-22 | End: 2018-03-04

## 2018-02-22 RX ORDER — CETIRIZINE HYDROCHLORIDE 10 MG/1
10 TABLET ORAL ONCE
Status: COMPLETED | OUTPATIENT
Start: 2018-02-22 | End: 2018-02-22

## 2018-02-22 RX ADMIN — CETIRIZINE HYDROCHLORIDE 10 MG: 10 TABLET, FILM COATED ORAL at 22:09

## 2018-02-22 RX ADMIN — RANITIDINE 150 MG: 150 TABLET ORAL at 22:10

## 2018-02-22 ASSESSMENT — ENCOUNTER SYMPTOMS
VOICE CHANGE: 0
TROUBLE SWALLOWING: 0
DIZZINESS: 0
ABDOMINAL PAIN: 0
NAUSEA: 0
SINUS PRESSURE: 0
PSYCHIATRIC NEGATIVE: 1
COUGH: 1
HEADACHES: 0
FATIGUE: 0
EYE REDNESS: 0
DIARRHEA: 0
EYE DISCHARGE: 0
FEVER: 0
NECK PAIN: 0
APPETITE CHANGE: 0
VOMITING: 0
NECK STIFFNESS: 0
SORE THROAT: 0
LIGHT-HEADEDNESS: 0
CARDIOVASCULAR NEGATIVE: 1

## 2018-02-22 NOTE — ED AVS SNAPSHOT
[facility]    [address] [address]    Phone:  [phone]                                       Gerald Evans   MRN: [id number]    Department:  [facility]   Date of Visit:  2/22/2018           After Visit Summary Signature Page     I have received my discharge instructions, and my questions have been answered. I have discussed any challenges I see with this plan with the nurse or doctor.    ..........................................................................................................................................  Patient/Patient Representative Signature      ..........................................................................................................................................  Patient Representative Print Name and Relationship to Patient    ..................................................               ................................................  Date                                            Time    ..........................................................................................................................................  Reviewed by Signature/Title    ...................................................              ..............................................  Date                                                            Time

## 2018-02-22 NOTE — ED AVS SNAPSHOT
[facility]    [address] [address]    Phone:  [phone]                                       Gerald Evans   MRN: [id number]    Department:  [facility]   Date of Visit:  2/22/2018           Patient Information     Date Of Birth          1995        Your diagnoses for this visit were:     Allergic urticaria     Upper respiratory tract infection, unspecified type        You were seen by Mindy Mcfarland PA.      Follow-up Information     Follow up with Jennie Carter NP.    Specialty:  Nurse Practitioner    Why:  If symptoms worsen    Contact information:    [facility]  [address] [facility] [address] [phone]          Follow up with [facility].    Specialty:  EMERGENCY MEDICINE    Why:  If further concerns develop over the weekend    Contact information:    [address] [address] [phone]    Additional information:    From [place]: Take [address]. Turn left at [address]. Turn left at the first stoplight on [address]. At the first stop sign, take a right onto [address]. Take a left into the parking lot and continue through until you reach the North enterance of the building.       From [place]: Take [address]. Take the [address] ramp towards [place]. Turn left onto [address]. Take a slight right onto [address]. Turn left at the first stoplight on [address]. At the first stop sign, take a right onto [address]. Take a left into the parking lot and continue through until you reach the North enterance of the building.       From [place]: Take [address]. Take a right at [address]. At the first stop sign, take a right onto [address]. Take a left into the parking lot and continue through until you reach the North enterance of the building.       Discharge References/Attachments      ALLERGIC REACTIONS, GENERAL (ENGLISH)    HIVES (URTICARIA) UNDERSTANDING (ENGLISH)    VIRAL RESPIRATORY ILLNESS IN CHILDREN, TREATING (ENGLISH)         Review of your medicines      START taking        Dose / Directions Last dose taken    cetirizine 10 MG tablet   Commonly known as:  zyrTEC   Dose:  10 mg   Quantity:  10 tablet        Take 1 tablet (10 mg) by mouth daily for 10 days   Refills:  0        ranitidine 150 MG tablet   Commonly known as:  ZANTAC   Dose:  150 mg   Quantity:  20 tablet        Take 1 tablet (150 mg) by mouth 2 times daily for 10 days   Refills:  0          Our records show that you are taking the medicines listed below. If these are incorrect, please call your family doctor or clinic.        Dose / Directions Last dose taken    acetaminophen-codeine 300-30 MG per tablet   Commonly known as:  TYLENOL #3   Dose:  1-2 tablet   Quantity:  6 tablet        Take 1-2 tablets by mouth every 4 hours as needed for moderate pain   Refills:  0        IBUPROFEN PO   Dose:  800 mg        Take 800 mg by mouth as needed   Refills:  0        TYLENOL PO   Dose:  1000 mg        Take 1,000 mg by mouth   Refills:  0                Prescriptions were sent or printed at these locations (2 Prescriptions)                   [id number] - [place], [place] - [address] AT [facility] [address]   [address], [place] [address]    Telephone:  [phone]   Fax:  [phone]   Hours:                  E-Prescribed (2 of 2)         ranitidine (ZANTAC) 150 MG tablet               cetirizine (ZYRTEC) 10 MG tablet                Orders Needing Specimen Collection     None      Pending Results     No orders found from 2/20/2018 to 2/23/2018.            Pending Culture Results     No orders found from 2/20/2018 to 2/23/2018.            Thank you for choosing [facility]       Thank you for choosing [facility] for your care. Our goal is always to provide you with excellent care. Hearing back from our patients is  "one way we can continue to improve our services. Please take a few minutes to complete the written survey that you may receive in the mail after you visit with us. Thank you!        [employer] Information     [employer] lets you send messages to your doctor, view your test results, renew your prescriptions, schedule appointments and more. To sign up, go to [email or web address] . Click on \"Log in\" on the left side of the screen, which will take you to the Welcome page. Then click on \"Sign up Now\" on the right side of the page.     You will be asked to enter the access code listed below, as well as some personal information. Please follow the directions to create your username and password.     Your access code is: [email or web address]  Expires: 5/3/2018 10:08 PM     Your access code will  in 90 days. If you need help or a new code, please call your [facility] clinic or [phone].        Care EveryWhere ID     This is your Care EveryWhere ID. This could be used by other organizations to access your [facility] medical records  [id number]        Equal Access to Services     FÁTIMA MADDEN : Hadkurt Rubio, ce nur, balbir perera, nicole barlow . So [facility] [phone].    ATENCIÓN: Si habla español, tiene a martinez disposición servicios gratuitos de asistencia lingüística. Llame al [phone].    We comply with applicable federal civil rights laws and [place] laws. We do not discriminate on the basis of race, color, national origin, age, disability, sex, sexual orientation, or gender identity.            After Visit Summary       This is your record. Keep this with you and show to your community pharmacist(s) and doctor(s) at your next visit.                  "

## 2018-02-23 NOTE — ED PROVIDER NOTES
"  History     Chief Complaint   Patient presents with     URI     The history is provided by the patient. No  was used.     Gerald Evans is a 22 year old female who \" Presents today with c/o nasal congestion and cough. States no fever. Also c/o hives on and off during this past week. \"  Pt had tried a new hair conditioner 1 week ago, prior to the itchy rash starting.  States the rash moves around her body.    No n/v/d/f/c. No ear pain. No sore throat. No face/sinus pain/pressure. No decrease in energy. No change in b/b habits    Problem List:    Patient Active Problem List    Diagnosis Date Noted     Previous  delivery, antepartum condition or complication 2016     Priority: Medium     Previous  delivery, antepartum 2016     Priority: Medium     Subchorionic hemorrhage in first trimester 2016     Priority: Medium     Refusing rhogam       Short interval between pregnancies affecting pregnancy in first trimester, antepartum 2016     Priority: Medium     Vaginal bleeding 2016     Priority: Medium     Refusing rhogam despite counseling       Heartburn 2015     Priority: Medium     Encounter for triage in pregnant patient 2015     Priority: Medium     NO SHOW 10/26/2015     Priority: Medium     No showed Dr. Fam 10/16/15; 12/23/15; 16; 16; 16; 16; 8/3/16; 16  Letter sent regarding multiple missed appointments 12/23/15.         Need for immunization against influenza 09/15/2015     Priority: Medium     refuses       Anxiety and depression 2015     Priority: Medium     tools       Mild intermittent asthma 2015     Priority: Medium     Need for Tdap vaccination 2015     Priority: Medium     Refuses despite counseling       Grief reaction 2015     Priority: Medium     8/12/15: Mother \"on life-support in [place] with <10% chance survival\"       Loose stools 2015     Priority: Medium     " Blood type, Rh negative 2015     Priority: Medium     Rhogam done 12/15/15       Hidradenitis suppurativa 2013     Priority: Medium     Smoker 2013     Priority: Medium     Trying to quit          Past Medical History:    History reviewed. No pertinent past medical history.    Past Surgical History:    Past Surgical History:   Procedure Laterality Date     BIOPSY OF SKIN LESION        SECTION  2016    [facility].     ENT SURGERY      adenoidectomy     ENT SURGERY      anesthesia for tooth work     LAPAROSCOPIC SALPINGECTOMY Left 2014    Procedure: LAPAROSCOPIC SALPINGECTOMY;  Surgeon: Leonel Bethea MD;  Location: [facility]       Family History:    Family History   Problem Relation Age of Onset     HEART DISEASE Mother      stent placement     C.A.D. Mother      DIABETES Paternal Grandmother      Hypertension Paternal Grandmother        Social History:  Marital Status:  Single [1]  Social History   Substance Use Topics     Smoking status: Current Every Day Smoker     Packs/day: 0.50     Years: 7.00     Types: Cigarettes     Smokeless tobacco: Never Used     Alcohol use Yes      Comment: occas        Medications:      ranitidine (ZANTAC) 150 MG tablet   cetirizine (ZYRTEC) 10 MG tablet   acetaminophen-codeine (TYLENOL #3) 300-30 MG per tablet   Acetaminophen (TYLENOL PO)   IBUPROFEN PO         Review of Systems   Constitutional: Negative for appetite change, fatigue and fever.   HENT: Positive for congestion. Negative for ear pain, sinus pressure, sore throat, trouble swallowing and voice change.    Eyes: Negative for discharge and redness.   Respiratory: Positive for cough.    Cardiovascular: Negative.    Gastrointestinal: Negative for abdominal pain, diarrhea, nausea and vomiting.   Genitourinary: Negative.    Musculoskeletal: Negative for neck pain and neck stiffness.   Skin: Positive for rash.   Neurological: Negative for dizziness, light-headedness and headaches.    Psychiatric/Behavioral: Negative.        Physical Exam   BP: 101/78  Pulse: 98  Temp: 97.4  F (36.3  C)  Resp: 16  SpO2: 99 %      Physical Exam   Constitutional: She is oriented to person, place, and time. She appears well-developed and well-nourished. No distress.   HENT:   Head: Normocephalic and atraumatic.   Right Ear: External ear normal.   Left Ear: External ear normal.   Mouth/Throat: Oropharynx is clear and moist.   Bilateral TMs/canals clear/wnl  No sinus TTP     Eyes: Conjunctivae and EOM are normal. Right eye exhibits no discharge. Left eye exhibits no discharge.   Neck: Normal range of motion. Neck supple.   Cardiovascular: Normal rate, regular rhythm and normal heart sounds.    Pulmonary/Chest: Effort normal and breath sounds normal. No respiratory distress.   Abdominal: Soft. Bowel sounds are normal. She exhibits no distension. There is no tenderness.   Neurological: She is alert and oriented to person, place, and time.   Skin: Skin is warm and dry. She is not diaphoretic.   Scant few area of hives on her forearms.   Psychiatric: She has a normal mood and affect.   Nursing note and vitals reviewed.      ED Course     ED Course     Procedures          Medications   ranitidine (ZANTAC) tablet 150 mg (150 mg Oral Given 2/22/18 2210)   cetirizine (zyrTEC) tablet 10 mg (10 mg Oral Given 2/22/18 2209)   pt tolerated well        Assessments & Plan (with Medical Decision Making)     I have reviewed the nursing notes.    I have reviewed the findings, diagnosis, plan and need for follow up with the patient.      New Prescriptions    CETIRIZINE (ZYRTEC) 10 MG TABLET    Take 1 tablet (10 mg) by mouth daily for 10 days    RANITIDINE (ZANTAC) 150 MG TABLET    Take 1 tablet (150 mg) by mouth 2 times daily for 10 days       Final diagnoses:   Allergic urticaria   Upper respiratory tract infection, unspecified type           Patient verbally educated and given appropriate education sheets for each of the diagnoses  and has no questions.  Take OTC motrin or tylenol as directed on the bottle as needed.  Take prescription medications as directed.  Increase fluids, wash hands often.  Sleep in a recliner or with multiple pillows until this has resolved.  Follow up with your provider if symptoms increase or if concerns develop, return to the ER.  Mindy Mcfarland Certified   Physician Assistant  2/22/2018  10:12 PM  URGENT CARE CLINIC    2/22/2018   [facility]     Mindy Mcfarland PA  02/22/18 4847

## 2018-02-23 NOTE — ED NOTES
Pt. Presents today with c/o nasal congestion and cough. States no fever. Also c/o hives on and off during this past week.

## 2018-03-06 ENCOUNTER — DOCUMENTATION ONLY (OUTPATIENT)
Dept: FAMILY MEDICINE | Facility: OTHER | Age: 23
End: 2018-03-06

## 2018-03-09 ENCOUNTER — DOCUMENTATION ONLY (OUTPATIENT)
Dept: FAMILY MEDICINE | Facility: OTHER | Age: 23
End: 2018-03-09

## 2018-04-30 ENCOUNTER — OFFICE VISIT (OUTPATIENT)
Dept: OBGYN | Facility: OTHER | Age: 23
End: 2018-04-30
Attending: OBSTETRICS & GYNECOLOGY
Payer: COMMERCIAL

## 2018-04-30 VITALS
HEART RATE: 60 BPM | DIASTOLIC BLOOD PRESSURE: 60 MMHG | WEIGHT: 110 LBS | SYSTOLIC BLOOD PRESSURE: 104 MMHG | BODY MASS INDEX: 20.24 KG/M2 | HEIGHT: 62 IN

## 2018-04-30 DIAGNOSIS — F17.200 SMOKER: ICD-10-CM

## 2018-04-30 DIAGNOSIS — Z30.09 FAMILY PLANNING: Primary | ICD-10-CM

## 2018-04-30 DIAGNOSIS — Z87.59 HISTORY OF ECTOPIC PREGNANCY: ICD-10-CM

## 2018-04-30 DIAGNOSIS — N94.6 DYSMENORRHEA: ICD-10-CM

## 2018-04-30 DIAGNOSIS — F41.9 ANXIETY AND DEPRESSION: ICD-10-CM

## 2018-04-30 DIAGNOSIS — N94.10 DYSPAREUNIA IN FEMALE: ICD-10-CM

## 2018-04-30 DIAGNOSIS — N34.2: ICD-10-CM

## 2018-04-30 DIAGNOSIS — F10.21 HISTORY OF ALCOHOLISM (H): ICD-10-CM

## 2018-04-30 DIAGNOSIS — Z20.2 POSSIBLE EXPOSURE TO STD: ICD-10-CM

## 2018-04-30 DIAGNOSIS — Z98.891 HISTORY OF C-SECTION: ICD-10-CM

## 2018-04-30 DIAGNOSIS — F32.A ANXIETY AND DEPRESSION: ICD-10-CM

## 2018-04-30 DIAGNOSIS — Z12.4 PAP SMEAR FOR CERVICAL CANCER SCREENING: ICD-10-CM

## 2018-04-30 LAB
SPECIMEN SOURCE: ABNORMAL
WET PREP SPEC: ABNORMAL

## 2018-04-30 PROCEDURE — G0463 HOSPITAL OUTPT CLINIC VISIT: HCPCS

## 2018-04-30 PROCEDURE — 87210 SMEAR WET MOUNT SALINE/INK: CPT | Mod: ZL | Performed by: OBSTETRICS & GYNECOLOGY

## 2018-04-30 PROCEDURE — 87591 N.GONORRHOEAE DNA AMP PROB: CPT | Mod: ZL | Performed by: OBSTETRICS & GYNECOLOGY

## 2018-04-30 PROCEDURE — 87491 CHLMYD TRACH DNA AMP PROBE: CPT | Mod: ZL | Performed by: OBSTETRICS & GYNECOLOGY

## 2018-04-30 PROCEDURE — G0463 HOSPITAL OUTPT CLINIC VISIT: HCPCS | Mod: 25

## 2018-04-30 PROCEDURE — G0123 SCREEN CERV/VAG THIN LAYER: HCPCS | Mod: ZL | Performed by: OBSTETRICS & GYNECOLOGY

## 2018-04-30 PROCEDURE — 99214 OFFICE O/P EST MOD 30 MIN: CPT | Performed by: OBSTETRICS & GYNECOLOGY

## 2018-04-30 RX ORDER — NORETHINDRONE ACETATE AND ETHINYL ESTRADIOL 1MG-20(21)
KIT ORAL
Qty: 112 TABLET | Refills: 4 | Status: SHIPPED | OUTPATIENT
Start: 2018-04-30 | End: 2018-07-21

## 2018-04-30 ASSESSMENT — ANXIETY QUESTIONNAIRES
GAD7 TOTAL SCORE: 13
6. BECOMING EASILY ANNOYED OR IRRITABLE: SEVERAL DAYS
3. WORRYING TOO MUCH ABOUT DIFFERENT THINGS: SEVERAL DAYS
IF YOU CHECKED OFF ANY PROBLEMS ON THIS QUESTIONNAIRE, HOW DIFFICULT HAVE THESE PROBLEMS MADE IT FOR YOU TO DO YOUR WORK, TAKE CARE OF THINGS AT HOME, OR GET ALONG WITH OTHER PEOPLE: SOMEWHAT DIFFICULT
5. BEING SO RESTLESS THAT IT IS HARD TO SIT STILL: SEVERAL DAYS
7. FEELING AFRAID AS IF SOMETHING AWFUL MIGHT HAPPEN: MORE THAN HALF THE DAYS
2. NOT BEING ABLE TO STOP OR CONTROL WORRYING: MORE THAN HALF THE DAYS
1. FEELING NERVOUS, ANXIOUS, OR ON EDGE: NEARLY EVERY DAY

## 2018-04-30 ASSESSMENT — PAIN SCALES - GENERAL: PAINLEVEL: NO PAIN (0)

## 2018-04-30 ASSESSMENT — PATIENT HEALTH QUESTIONNAIRE - PHQ9: 5. POOR APPETITE OR OVEREATING: NEARLY EVERY DAY

## 2018-04-30 NOTE — PATIENT INSTRUCTIONS
Lab today.  Folate (and/or prenatal vitamin) daily.  Oral contraceptives. Active tablets daily to skip periods.  See Dr. Kulkarni if pain continues.     You will be contacted by clinic scheduling to schedule your urology consultation appointment. Please call the nurse at [phone] if you are not contacted in a timely manner.    See Kevyn Cervantes for annual exam.     Standing order is in lab for pregnancy hormone level test good for one year. You may come to clinic anytime during clinic hours that you think you are pregnant without calling ahead for an appointment time. You may need to call for results. [phone]

## 2018-04-30 NOTE — NURSING NOTE
"Chief Complaint   Patient presents with     planning pregnancy       Initial /60  Pulse 60  Ht 5' 2\" (1.575 m)  Wt 110 lb (49.9 kg)  BMI 20.12 kg/m2 Estimated body mass index is 20.12 kg/(m^2) as calculated from the following:    Height as of this encounter: 5' 2\" (1.575 m).    Weight as of this encounter: 110 lb (49.9 kg).  Medication Reconciliation: martha Su      "

## 2018-04-30 NOTE — PROGRESS NOTES
"Gerald Evans is a 22 year old female thinking about having another baby. Has been having sex without protection for greater than a year. Thought she was briefly pregnant in July of last year. Has sex 1/wk. Sex is painful like he is running into something. Hx ectopic, ,. Has child with special needs. Recovering alcoholic.  Folate discussed. Regular periods. 30-32.       O:   /60  Pulse 60  Ht 5' 2\" (1.575 m)  Wt 110 lb (49.9 kg)  BMI 20.12 kg/m2   aa  Hydradenitis suppurativa noted  Vaginal discharge noted  Vulva with cyst at right skein's gland non tender no discharge on compression  No adnexal masses noted  Normal vagina   Normal NT mobile uterus    A: dyspareunia  Exposure  Severe dysmenorrhea  Smoker  Family planning  History ectopic  History   Hx alcoholism  Depression   Anxiety  Skein's cyst    P:  Folate  BTB oca's for now  Dr. Kulkarni if pain continues  Pap,gc,ct,wet prep,hiv,hep,trep,tsh,hp  Annual RE  Urology consult for right skein's gland swollen  Add standing quant order        Greater than 25 minutes were spent face to face counseling this patient    Manju Fam MD    "

## 2018-04-30 NOTE — PROGRESS NOTES
Patient refused to do labs today due to another appointment. States will return on Thursday for labs.

## 2018-04-30 NOTE — MR AVS SNAPSHOT
After Visit Summary   2018    Gerald Evans    MRN: [id number]           Patient Information     Date Of Birth          1995        Visit Information        Provider Department      2018 2:20 PM Manju Fam MD [facility]        Today's Diagnoses     Family planning    -  1    Dyspareunia in female        Possible exposure to STD        Smoker        History of ectopic pregnancy        History of         History of alcoholism (H)        Dysmenorrhea        Pap smear for cervical cancer screening        Anxiety and depression        [place]'s gland inflammation          Care Instructions    Lab today.  Folate (and/or prenatal vitamin) daily.  Oral contraceptives. Active tablets daily to skip periods.  See Dr. Kulkarni if pain continues.     You will be contacted by clinic scheduling to schedule your urology consultation appointment. Please call the nurse at [phone] if you are not contacted in a timely manner.    See Kevyn Cervantes for annual exam.     Standing order is in lab for pregnancy hormone level test good for one year. You may come to clinic anytime during clinic hours that you think you are pregnant without calling ahead for an appointment time. You may need to call for results. [phone]          Follow-ups after your visit        Additional Services     UROLOGY ADULT REFERRAL       Your provider has referred you to: urology consultation for swollen right skene's gland    Please be aware that coverage of these services is subject to the terms and limitations of your health insurance plan.  Call member services at your health plan with any benefit or coverage questions.      Please bring the following with you to your appointment:    (1) Any X-Rays, CTs or MRIs which have been performed.  Contact the facility where they were done to arrange for  prior to your scheduled appointment.    (2) List of current medications  (3) This referral request  "  (4) Any documents/labs given to you for this referral                  Future tests that were ordered for you today     Open Standing Orders        Priority Remaining Interval Expires Ordered    HCG quantitative pregnancy STAT 10/10  2019 2018            Who to contact     If you have questions or need follow up information about today's clinic visit or your schedule please contact [facility] directly at [phone].  Normal or non-critical lab and imaging results will be communicated to you by MyChart, letter or phone within 4 business days after the clinic has received the results. If you do not hear from us within 7 days, please contact the clinic through [employer] or phone. If you have a critical or abnormal lab result, we will notify you by phone as soon as possible.  Submit refill requests through [employer] or call your pharmacy and they will forward the refill request to us. Please allow 3 business days for your refill to be completed.          Additional Information About Your Visit        [employer] Information     [employer] lets you send messages to your doctor, view your test results, renew your prescriptions, schedule appointments and more. To sign up, go to [email or web address] . Click on \"Log in\" on the left side of the screen, which will take you to the Welcome page. Then click on \"Sign up Now\" on the right side of the page.     You will be asked to enter the access code listed below, as well as some personal information. Please follow the directions to create your username and password.     Your access code is: [email or web address]  Expires: 5/3/2018 11:08 PM     Your access code will  in 90 days. If you need help or a new code, please call your [facility] or [phone].        Care EveryWhere ID     This is your Care EveryWhere ID. This could be used by other organizations to access your [facility] medical records  [id number]        Your Vitals Were     Pulse Height BMI " "(Body Mass Index)             60 5' 2\" (1.575 m) 20.12 kg/m2          Blood Pressure from Last 3 Encounters:   04/30/18 104/60   02/22/18 101/78   02/11/18 111/71    Weight from Last 3 Encounters:   04/30/18 110 lb (49.9 kg)   06/18/17 108 lb (49 kg)   12/31/16 115 lb (52.2 kg)              We Performed the Following     A pap thin layer screen reflex to HPV if ASCUS - recommend age 25 - 29     Anti Treponema     CBC with platelets     GC/Chlamydia by PCR - [facility]     Hepatitis B surface antigen     Hepatitis C antibody     HIV Antigen Antibody Combo     TSH     UROLOGY ADULT REFERRAL     Wet prep          Today's Medication Changes          These changes are accurate as of 4/30/18  3:51 PM.  If you have any questions, ask your nurse or doctor.               Start taking these medicines.        Dose/Directions    norethindrone-ethinyl estradiol 1-20 MG-MCG per tablet   Commonly known as:  MICROGESTIN FE 1/20   Used for:  Family planning   Started by:  Manju Fam MD        Active pill daily for amenorrhea   Quantity:  112 tablet   Refills:  4            Where to get your medicines      These medications were sent to [facility] [place] - [address]  [address] [place] [address]     Phone:  [phone]     norethindrone-ethinyl estradiol 1-20 MG-MCG per tablet                Primary Care Provider Office Phone # Fax #    Jennie ROBERT Carter [phone]       [facility] [address]  [facility] [address]        Equal Access to Services     [facility] : Hadii che thomas Sosusan, waaxda luqadaha, qaybta kaalmada trever, nicole barlow . So [facility] [phone].    ATENCIÓN: Si habla español, tiene a martinez disposición servicios gratuitos de asistencia lingüística. Aniya al [phone].    We comply with applicable federal civil rights laws and [place] laws. We do not discriminate on the basis of race, color, national " origin, age, disability, sex, sexual orientation, or gender identity.            Thank you!     [facility] you [facility] choosing [facility]  for your care. Our goal is always to provide you with excellent care. Hearing back from our patients is one way we can continue to improve our services. Please take a few minutes to complete the written survey that you may receive in the mail after your visit with us. Thank you!             Your Updated Medication List - Protect others around you: Learn how to safely use, store and throw away your medicines at www.disposemymeds.org.          This list is accurate as of 4/30/18  3:51 PM.  Always use your most recent med list.                   Brand Name Dispense Instructions for use Diagnosis    IBUPROFEN PO      Take 800 mg by mouth as needed        norethindrone-ethinyl estradiol 1-20 MG-MCG per tablet    MICROGESTIN FE 1/20    112 tablet    Active pill daily for amenorrhea    Family planning       TYLENOL PO      Take 1,000 mg by mouth

## 2018-05-01 ENCOUNTER — TELEPHONE (OUTPATIENT)
Dept: OBGYN | Facility: OTHER | Age: 23
End: 2018-05-01

## 2018-05-01 DIAGNOSIS — N76.0 BACTERIAL VAGINOSIS: Primary | ICD-10-CM

## 2018-05-01 DIAGNOSIS — B96.89 BACTERIAL VAGINOSIS: Primary | ICD-10-CM

## 2018-05-01 LAB
C TRACH DNA SPEC QL PROBE+SIG AMP: NOT DETECTED
N GONORRHOEA DNA SPEC QL PROBE+SIG AMP: NOT DETECTED
SPECIMEN SOURCE: NORMAL

## 2018-05-01 RX ORDER — METRONIDAZOLE 500 MG/1
2000 TABLET ORAL ONCE
Qty: 4 TABLET | Refills: 0 | Status: SHIPPED | OUTPATIENT
Start: 2018-05-01 | End: 2018-05-01

## 2018-05-01 ASSESSMENT — ANXIETY QUESTIONNAIRES: GAD7 TOTAL SCORE: 13

## 2018-05-01 ASSESSMENT — PATIENT HEALTH QUESTIONNAIRE - PHQ9: SUM OF ALL RESPONSES TO PHQ QUESTIONS 1-9: 9

## 2018-05-04 LAB
COPATH REPORT: ABNORMAL
PAP: ABNORMAL

## 2018-05-07 PROBLEM — R87.612 PAPANICOLAOU SMEAR OF CERVIX WITH LOW GRADE SQUAMOUS INTRAEPITHELIAL LESION (LGSIL): Status: ACTIVE | Noted: 2018-05-07

## 2018-07-20 ENCOUNTER — TELEPHONE (OUTPATIENT)
Dept: OBGYN | Facility: OTHER | Age: 23
End: 2018-07-20

## 2018-07-20 DIAGNOSIS — Z34.91 NORMAL PREGNANCY IN FIRST TRIMESTER: Primary | ICD-10-CM

## 2018-07-20 DIAGNOSIS — Z32.01 PREGNANCY TEST POSITIVE: ICD-10-CM

## 2018-07-20 DIAGNOSIS — Z32.01 PREGNANCY TEST POSITIVE: Primary | ICD-10-CM

## 2018-07-20 LAB — B-HCG SERPL-ACNC: 4857 IU/L (ref 0–5)

## 2018-07-20 PROCEDURE — 36415 COLL VENOUS BLD VENIPUNCTURE: CPT | Mod: ZL

## 2018-07-20 PROCEDURE — 84702 CHORIONIC GONADOTROPIN TEST: CPT | Mod: ZL

## 2018-07-20 RX ORDER — PRENATAL VIT/IRON FUM/FOLIC AC 27MG-0.8MG
1 TABLET ORAL DAILY
Qty: 100 TABLET | Refills: 3 | Status: SHIPPED | OUTPATIENT
Start: 2018-07-20 | End: 2019-06-19

## 2018-07-20 NOTE — TELEPHONE ENCOUNTER
Patient called with a positive pregnancy test. She is set up to see Dr Kulkarni on 7/26/2018 @10 am. Could you please sign orders for prenatal vitamins? Preferred pharmacy listed for you, thank you!

## 2018-07-21 ENCOUNTER — HOSPITAL ENCOUNTER (EMERGENCY)
Facility: HOSPITAL | Age: 23
Discharge: HOME | End: 2018-07-21
Attending: EMERGENCY MEDICINE | Admitting: EMERGENCY MEDICINE
Payer: MEDICAID

## 2018-07-21 ENCOUNTER — APPOINTMENT (OUTPATIENT)
Dept: ULTRASOUND IMAGING | Facility: HOSPITAL | Age: 23
End: 2018-07-21
Attending: EMERGENCY MEDICINE
Payer: MEDICAID

## 2018-07-21 VITALS
SYSTOLIC BLOOD PRESSURE: 109 MMHG | TEMPERATURE: 98.1 F | DIASTOLIC BLOOD PRESSURE: 62 MMHG | OXYGEN SATURATION: 98 % | RESPIRATION RATE: 14 BRPM

## 2018-07-21 DIAGNOSIS — O26.899 ABDOMINAL PAIN DURING INTRAUTERINE PREGNANCY: ICD-10-CM

## 2018-07-21 DIAGNOSIS — R10.9 ABDOMINAL PAIN DURING INTRAUTERINE PREGNANCY: ICD-10-CM

## 2018-07-21 LAB
B-HCG SERPL-ACNC: 5700 IU/L (ref 0–5)
BASOPHILS # BLD AUTO: 0 10E9/L (ref 0–0.2)
BASOPHILS NFR BLD AUTO: 0.3 %
DIFFERENTIAL METHOD BLD: NORMAL
EOSINOPHIL # BLD AUTO: 0.1 10E9/L (ref 0–0.7)
EOSINOPHIL NFR BLD AUTO: 0.8 %
ERYTHROCYTE [DISTWIDTH] IN BLOOD BY AUTOMATED COUNT: 13.2 % (ref 10–15)
HCT VFR BLD AUTO: 36.9 % (ref 35–47)
HGB BLD-MCNC: 12.7 G/DL (ref 11.7–15.7)
IMM GRANULOCYTES # BLD: 0 10E9/L (ref 0–0.4)
IMM GRANULOCYTES NFR BLD: 0.2 %
LYMPHOCYTES # BLD AUTO: 2.5 10E9/L (ref 0.8–5.3)
LYMPHOCYTES NFR BLD AUTO: 40.5 %
MCH RBC QN AUTO: 29.3 PG (ref 26.5–33)
MCHC RBC AUTO-ENTMCNC: 34.4 G/DL (ref 31.5–36.5)
MCV RBC AUTO: 85 FL (ref 78–100)
MONOCYTES # BLD AUTO: 0.6 10E9/L (ref 0–1.3)
MONOCYTES NFR BLD AUTO: 9.2 %
NEUTROPHILS # BLD AUTO: 3 10E9/L (ref 1.6–8.3)
NEUTROPHILS NFR BLD AUTO: 49 %
NRBC # BLD AUTO: 0 10*3/UL
NRBC BLD AUTO-RTO: 0 /100
PLATELET # BLD AUTO: 260 10E9/L (ref 150–450)
RBC # BLD AUTO: 4.34 10E12/L (ref 3.8–5.2)
WBC # BLD AUTO: 6.2 10E9/L (ref 4–11)

## 2018-07-21 PROCEDURE — 99284 EMERGENCY DEPT VISIT MOD MDM: CPT | Mod: 25

## 2018-07-21 PROCEDURE — 99284 EMERGENCY DEPT VISIT MOD MDM: CPT | Performed by: EMERGENCY MEDICINE

## 2018-07-21 PROCEDURE — 85025 COMPLETE CBC W/AUTO DIFF WBC: CPT | Performed by: EMERGENCY MEDICINE

## 2018-07-21 PROCEDURE — 84702 CHORIONIC GONADOTROPIN TEST: CPT | Performed by: EMERGENCY MEDICINE

## 2018-07-21 PROCEDURE — 76817 TRANSVAGINAL US OBSTETRIC: CPT | Mod: TC

## 2018-07-21 PROCEDURE — 36415 COLL VENOUS BLD VENIPUNCTURE: CPT | Performed by: EMERGENCY MEDICINE

## 2018-07-21 ASSESSMENT — ENCOUNTER SYMPTOMS
MYALGIAS: 1
NEUROLOGICAL NEGATIVE: 1
CONSTITUTIONAL NEGATIVE: 1
ABDOMINAL PAIN: 1

## 2018-07-21 NOTE — ED AVS SNAPSHOT
[facility]    [address] [address]    Phone:  [phone]                                       Gerald Evans   MRN: [id number]    Department:  [facility]   Date of Visit:  7/21/2018           After Visit Summary Signature Page     I have received my discharge instructions, and my questions have been answered. I have discussed any challenges I see with this plan with the nurse or doctor.    ..........................................................................................................................................  Patient/Patient Representative Signature      ..........................................................................................................................................  Patient Representative Print Name and Relationship to Patient    ..................................................               ................................................  Date                                            Time    ..........................................................................................................................................  Reviewed by Signature/Title    ...................................................              ..............................................  Date                                                            Time

## 2018-07-21 NOTE — ED PROVIDER NOTES
History     Chief Complaint   Patient presents with     Pelvic Pain     rt lower pelvic pain, notes pain since late last night. states 7 weeks pregnant and had same pain with a tubal pregnancy     HPI  Gerald Evans is a 23 year old female with the above hx.  She is  with an ectopic and subsequent twins delivered by C/Sx 2years ago.  OCs taken carefully apparently failed her as she was found on recent home ept to be (+) and confirmed yesterday by [facility] with a quant HCG that led to presumed 7 wk gestation.  Scheduled for first prenatal exam in 5 days but comes in today because of right lower quadrant pain and worried about another ectopic.  No bleeding or discharge or LUTS.      Problem List:    Patient Active Problem List    Diagnosis Date Noted     Papanicolaou smear of cervix with low grade squamous intraepithelial lesion (LGSIL) 2018     Priority: Medium     Repeat at annual (24)       Dyspareunia in female 2018     Priority: Medium     History of ectopic pregnancy 2018     Priority: Medium     History of  2018     Priority: Medium     History of alcoholism (H) 2018     Priority: Medium     Dysmenorrhea 2018     Priority: Medium     Previous  delivery, antepartum condition or complication 2016     Priority: Medium     Previous  delivery, antepartum 2016     Priority: Medium     Subchorionic hemorrhage in first trimester 2016     Priority: Medium     Refusing rhogam       Short interval between pregnancies affecting pregnancy in first trimester, antepartum 2016     Priority: Medium     Vaginal bleeding 2016     Priority: Medium     Refusing rhogam despite counseling       Heartburn 2015     Priority: Medium     Encounter for triage in pregnant patient 2015     Priority: Medium     NO SHOW 10/26/2015     Priority: Medium     No showed Dr. Fam 10/16/15; 12/23/15; 16; 16; 16; 16;  "8/3/16; 16  Letter sent regarding multiple missed appointments 12/23/15.         Need for immunization against influenza 09/15/2015     Priority: Medium     refuses       Anxiety and depression 2015     Priority: Medium     tools       Mild intermittent asthma 2015     Priority: Medium     Need for Tdap vaccination 2015     Priority: Medium     Refuses despite counseling       Grief reaction 2015     Priority: Medium     8/12/15: Mother \"on life-support in [place] with <10% chance survival\"       Loose stools 2015     Priority: Medium     Blood type, Rh negative 2015     Priority: Medium     Rhogam done 12/15/15       Hidradenitis suppurativa 2013     Priority: Medium     Smoker 2013     Priority: Medium     Trying to quit          Past Medical History:    No past medical history on file.    Past Surgical History:    Past Surgical History:   Procedure Laterality Date     BIOPSY OF SKIN LESION        SECTION  2016    [facility].     ENT SURGERY      adenoidectomy     ENT SURGERY      anesthesia for tooth work     LAPAROSCOPIC SALPINGECTOMY Left 2014    Procedure: LAPAROSCOPIC SALPINGECTOMY;  Surgeon: Leonel Bethea MD;  Location: [facility]       Family History:    Family History   Problem Relation Age of Onset     HEART DISEASE Mother      stent placement     C.A.D. Mother      Diabetes Paternal Grandmother      Hypertension Paternal Grandmother        Social History:  Marital Status:  Single [1]  Social History   Substance Use Topics     Smoking status: Current Every Day Smoker     Packs/day: 0.50     Years: 7.00     Types: Cigarettes     Smokeless tobacco: Never Used     Alcohol use Yes      Comment: occas        Medications:      Acetaminophen (TYLENOL PO)   Prenatal Vit-Fe Fumarate-FA (PRENATAL MULTIVITAMIN PLUS IRON) 27-0.8 MG TABS per tablet     Review of Systems   Constitutional: Negative.    Gastrointestinal: Positive for abdominal " pain.   Genitourinary: Negative.    Musculoskeletal: Positive for myalgias.   Neurological: Negative.      Physical Exam   BP: 109/62  Heart Rate: 96  Temp: 98.1  F (36.7  C)  Resp: 14  SpO2: 98 %    Physical Exam   Constitutional: She appears well-developed and well-nourished. No distress.   HENT:   Head: Normocephalic.   Eyes: Conjunctivae and EOM are normal. Pupils are equal, round, and reactive to light.   Neck: Normal range of motion.   Cardiovascular: Normal rate.    Pulmonary/Chest: Effort normal.   Abdominal: Soft. There is tenderness.   Deep tenderness in RLQ   Musculoskeletal: Normal range of motion.   Neurological: She is alert.   Skin: Skin is warm. She is not diaphoretic.     ED Course     ED Course     Procedures  Critical Care time:  none    Results for orders placed or performed during the hospital encounter of 07/21/18 (from the past 24 hour(s))   CBC with platelets differential   Result Value Ref Range    WBC 6.2 4.0 - 11.0 10e9/L    RBC Count 4.34 3.8 - 5.2 10e12/L    Hemoglobin 12.7 11.7 - 15.7 g/dL    Hematocrit 36.9 35.0 - 47.0 %    MCV 85 78 - 100 fl    MCH 29.3 26.5 - 33.0 pg    MCHC 34.4 31.5 - 36.5 g/dL    RDW 13.2 10.0 - 15.0 %    Platelet Count 260 150 - 450 10e9/L    Diff Method Automated Method     % Neutrophils 49.0 %    % Lymphocytes 40.5 %    % Monocytes 9.2 %    % Eosinophils 0.8 %    % Basophils 0.3 %    % Immature Granulocytes 0.2 %    Nucleated RBCs 0 0 /100    Absolute Neutrophil 3.0 1.6 - 8.3 10e9/L    Absolute Lymphocytes 2.5 0.8 - 5.3 10e9/L    Absolute Monocytes 0.6 0.0 - 1.3 10e9/L    Absolute Eosinophils 0.1 0.0 - 0.7 10e9/L    Absolute Basophils 0.0 0.0 - 0.2 10e9/L    Abs Immature Granulocytes 0.0 0 - 0.4 10e9/L    Absolute Nucleated RBC 0.0    HCG quantitative pregnancy   Result Value Ref Range    HCG Quantitative Serum 5700 (H) 0 - 5 IU/L   US OB < 14 Weeks w Transvaginal    Narrative    US OB <14 WEEKS WITH TRANSVAGINAL SINGLE    HISTORY: 23 yearsFemale rule in IUP  reportedly at 7 weeks by  yesterdays quant HCG;     TECHNIQUE: Transabdominal transvaginal pelvic ultrasonography was  performed. Grayscale and color duplex technique was utilized.    COMPARISON: None    FINDINGS: A small intrauterine fluid collection is visualized. Comment  appearance is of a gestational sac. A yolk sac is visible. No fetal  pole or heart activity is detected at this time.    The right ovary is 3.4 x 2.3 x 2.5 cm. Question presence of a right  corpus luteum cyst.    The left ovary is not well visualized.      Impression    IMPRESSION: Intrauterine gestational sac is visualized. Mean sac  diameter 0.8 cm correlating with a 5 week 4 day gestation. No fetal  pole or heart activity is detected this time.    LYNDSEY HAWK MD       Medications - No data to display    Assessments & Plan (with Medical Decision Making)   Gerald presented with nonspecific RLQ pain without signs of peritoneal irritation but to settle the question of pregnancy implantation, elected to ultrasound directly in place of pelvic since she will be getting her exam in a few days.  The US was reassuring with an IUP and a probable right corpus luteum cyst.  Reassured and advised.  I have reviewed the nursing notes.    I have reviewed the findings, diagnosis, plan and need for follow up with the patient.  Discharge Medication List as of 7/21/2018  2:28 PM          Final diagnoses:   Abdominal pain during intrauterine pregnancy       7/21/2018   [facility]     Julio Nunez MD  07/21/18 4508

## 2018-07-21 NOTE — ED NOTES
States she is 7 weeks pregnant, and was seen by Dr. Kulkarni yesterday. She has a hx of tubal pregnancy. He told her if she developed any abdominal pain, she should come to the ED. She developed some abdominal pain...(indicating right groin) last night which she presents with at this time.

## 2018-07-21 NOTE — ED AVS SNAPSHOT
[facility]    [address] [address]    Phone:  [phone]                                       Gerald Evans   MRN: [id number]    Department:  [facility]   Date of Visit:  7/21/2018           Patient Information     Date Of Birth          1995        Your diagnoses for this visit were:     Abdominal pain during intrauterine pregnancy        You were seen by Julio Nunez MD.      Follow-up Information     Follow up with Jennie Carter NP.    Specialty:  Nurse Practitioner    Why:  As needed    Contact information:    [facility]  [address] [facility] [address]  [phone]          Discharge Instructions       Gerald,   Your right sided abdominal pain may be related to one of the two small cysts on the right ovary that the Ultrasound detected or some other nonspecific groin muscle or ligament pain.  The pregnancy is located inside the uterus so it is not an ectopic.    Keep your appointment with Dr. Kulkarni this week.  Good luck!    Your next 10 appointments already scheduled     Jul 25, 2018  2:45 PM CDT   (Arrive by 2:30 PM)   New Visit with Edmar Sánchez MD   [facility] ([facility] )    [address] [address]   [phone]            Jul 26, 2018 10:00 AM CDT   (Arrive by 9:45 AM)   Office Visit with Caleb Kulkarni MD   [facility] )    [address] [address]   [phone]           Bring a current list of meds and any records pertaining to this visit.  For Physicals, please bring immunization records and any forms needing to be filled out.  Please arrive 15 minutes early to complete paperwork and register            Aug 06, 2018 10:15 AM CDT   (Arrive by 10:00 AM)   PHYSICAL with ELLIOTT Arauz CNM   [facility] ([facility] )    [address] [address]   [phone]     "             Review of your medicines      Our records show that you are taking the medicines listed below. If these are incorrect, please call your family doctor or clinic.        Dose / Directions Last dose taken    prenatal multivitamin plus iron 27-0.8 MG Tabs per tablet   Dose:  1 tablet   Quantity:  100 tablet        Take 1 tablet by mouth daily   Refills:  3        TYLENOL PO   Dose:  1000 mg        Take 1,000 mg by mouth   Refills:  0                Procedures and tests performed during your visit     CBC with platelets differential    HCG quantitative pregnancy    US OB < 14 Weeks w Transvaginal      Orders Needing Specimen Collection     None      Pending Results     Date and Time Order Name Status Description    2018 1247 US OB < 14 Weeks w Transvaginal In process             Pending Culture Results     No orders found from 2018 to 2018.            Thank you for choosing [facility]       Thank you for choosing [facility] for your care. Our goal is always to provide you with excellent care. Hearing back from our patients is one way we can continue to improve our services. Please take a few minutes to complete the written survey that you may receive in the mail after you visit with us. Thank you!        [employer] Information     [employer] lets you send messages to your doctor, view your test results, renew your prescriptions, schedule appointments and more. To sign up, go to [email or web address] . Click on \"Log in\" on the left side of the screen, which will take you to the Welcome page. Then click on \"Sign up Now\" on the right side of the page.     You will be asked to enter the access code listed below, as well as some personal information. Please follow the directions to create your username and password.     Your access code is: [email or web address]  Expires: 10/19/2018  2:28 PM     Your access code will  in 90 days. If you need help or a new code, please call your [facility] clinic or " [phone].        Care EveryWhere ID     This is your Care EveryWhere ID. This could be used by other organizations to access your [facility] medical records  [id number]        Equal Access to Services     FÁTIMA MADDEN : Margie Rubio, wapaulieda liudmila, qaybta kaalmada rosalbadannielledee, nicole crockett. So [facility] [phone].    ATENCIÓN: Si habla español, tiene a martinez disposición servicios gratuitos de asistencia lingüística. Llame al [phone].    We comply with applicable federal civil rights laws and [place] laws. We do not discriminate on the basis of race, color, national origin, age, disability, sex, sexual orientation, or gender identity.            After Visit Summary       This is your record. Keep this with you and show to your community pharmacist(s) and doctor(s) at your next visit.

## 2018-07-21 NOTE — ED NOTES
Approached nurses station and states she is checking self out due to she has 2 other children at home. Informed patient discharge is ready and I can print paperwork right now if she is willing to wait 1 more minute to go over discharge paperwork. Patient agreeable. Did refuse to go back into room for discharge instructions and vitals. Denies pain. States just wants to get home to children. Discharge instructions given. Verbalized understanding. Ambulated out of ED independently with daughter at side.

## 2018-07-21 NOTE — DISCHARGE INSTRUCTIONS
Gerald,   Your right sided abdominal pain may be related to one of the two small cysts on the right ovary that the Ultrasound detected or some other nonspecific groin muscle or ligament pain.  The pregnancy is located inside the uterus so it is not an ectopic.    Keep your appointment with Dr. Kulkarni this week.  Good luck!

## 2018-07-25 ENCOUNTER — OFFICE VISIT (OUTPATIENT)
Dept: UROLOGY | Facility: OTHER | Age: 23
End: 2018-07-25
Attending: UROLOGY
Payer: MEDICAID

## 2018-07-25 VITALS
TEMPERATURE: 98.5 F | HEART RATE: 89 BPM | DIASTOLIC BLOOD PRESSURE: 62 MMHG | RESPIRATION RATE: 16 BRPM | HEIGHT: 62 IN | OXYGEN SATURATION: 99 % | SYSTOLIC BLOOD PRESSURE: 92 MMHG | BODY MASS INDEX: 20.43 KG/M2 | WEIGHT: 111 LBS

## 2018-07-25 DIAGNOSIS — N34.2: ICD-10-CM

## 2018-07-25 PROCEDURE — 99202 OFFICE O/P NEW SF 15 MIN: CPT | Performed by: UROLOGY

## 2018-07-25 PROCEDURE — G0463 HOSPITAL OUTPT CLINIC VISIT: HCPCS | Performed by: UROLOGY

## 2018-07-25 ASSESSMENT — PAIN SCALES - GENERAL: PAINLEVEL: NO PAIN (0)

## 2018-07-25 NOTE — MR AVS SNAPSHOT
"              After Visit Summary   7/25/2018    Gerald Evans    MRN: [id number]           Patient Information     Date Of Birth          1995        Visit Information        Provider Department      7/25/2018 2:45 PM Edmar Sánchez MD [facility]        Today's Diagnoses     [facility]'s gland inflammation           Follow-ups after your visit        Your next 10 appointments already scheduled     Aug 06, 2018 10:15 AM CDT   (Arrive by 10:00 AM)   PHYSICAL with ELLIOTT Arauz CNM   [facility] ([facility]    [address]  [place] [address]   [phone]            May 08, 2019  2:45 PM CDT   (Arrive by 2:30 PM)   Return Visit with Edmar Sánchez MD   [facility] ([facility] )    [address]  [place] [address]   [phone]              Who to contact     If you have questions or need follow up information about today's clinic visit or your schedule please contact [facility] directly at [phone].  Normal or non-critical lab and imaging results will be communicated to you by [facility]hart, letter or phone within 4 business days after the clinic has received the results. If you do not hear from us within 7 days, please contact the clinic through [facility]hart or phone. If you have a critical or abnormal lab result, we will notify you by phone as soon as possible.  Submit refill requests through Reflex or call your pharmacy and they will forward the refill request to us. Please allow 3 business days for your refill to be completed.          Additional Information About Your Visit        MyChart Information     Reflex lets you send messages to your doctor, view your test results, renew your prescriptions, schedule appointments and more. To sign up, go to [email or web address] . Click on \"Log in\" on the left side of the screen, which will take you to the Welcome page. Then click on \"Sign up Now\" on the right " "side of the page.     You will be asked to enter the access code listed below, as well as some personal information. Please follow the directions to create your username and password.     Your access code is: [email or web address]  Expires: 10/19/2018  2:28 PM     Your access code will  in 90 days. If you need help or a new code, please call your [place] clinic or [phone].        Care EveryWhere ID     This is your Care EveryWhere ID. This could be used by other organizations to access your [place] medical records  [id number]        Your Vitals Were     Pulse Temperature Respirations Height Last Period Pulse Oximetry    89 98.5  F (36.9  C) (Tympanic) 16 1.575 m (5' 2\") (LMP Unknown) 99%    BMI (Body Mass Index)                   20.3 kg/m2            Blood Pressure from Last 3 Encounters:   18 98/56   18 92/62   18 109/62    Weight from Last 3 Encounters:   18 50.8 kg (112 lb)   18 50.3 kg (111 lb)   18 49.9 kg (110 lb)              Today, you had the following     No orders found for display       Primary Care Provider Office Phone # Fax #    Jennie Carter [phone]       [facility] [address] [facility] [address]        Equal Access to Services     [facility]: Hadii che douglass hadasho Sosusan, waaxda luqadaha, qaybta kaalmada adeameyayada, nicole barlow . So [facility] [phone].    ATENCIÓN: Si habla español, tiene a martinez disposición servicios gratuitos de asistencia lingüística. Aniya al [phone].    We comply with applicable federal civil rights laws and [place] laws. We do not discriminate on the basis of race, color, national origin, age, disability, sex, sexual orientation, or gender identity.            Thank you!     Thank you for choosing [facility]  for your care. Our goal is always to provide you with excellent care. Hearing back from our patients is one way we can continue to " improve our services. Please take a few minutes to complete the written survey that you may receive in the mail after your visit with us. Thank you!             Your Updated Medication List - Protect others around you: Learn how to safely use, store and throw away your medicines at www.disposemymeds.org.          This list is accurate as of 7/25/18 11:59 PM.  Always use your most recent med list.                   Brand Name Dispense Instructions for use Diagnosis    prenatal multivitamin plus iron 27-0.8 MG Tabs per tablet     100 tablet    Take 1 tablet by mouth daily    Normal pregnancy in first trimester       TYLENOL PO      Take 1,000 mg by mouth

## 2018-07-25 NOTE — NURSING NOTE
"Chief Complaint   Patient presents with     Consult     Regarding skeins gland swollen per Dr Fam. Patient is aprox 7 weeks pregnant per patient.        Initial BP 92/62  Pulse 89  Temp 98.5  F (36.9  C) (Tympanic)  Resp 16  Ht 1.575 m (5' 2\")  Wt 50.3 kg (111 lb)  LMP  (LMP Unknown)  SpO2 99%  BMI 20.3 kg/m2 Estimated body mass index is 20.3 kg/(m^2) as calculated from the following:    Height as of this encounter: 1.575 m (5' 2\").    Weight as of this encounter: 50.3 kg (111 lb).  Medication Reconciliation: complete    Jenna Leo LPN  Review of Systems:    Weight loss:    No     Recent fever/chills:  No   Night sweats:   No  Current skin rash:  No   Recent hair loss:  No  Heat intolerance:  No   Cold intolerance:  No  Chest pain:   No   Palpitations:   No  Shortness of breath:  No   Wheezing:   No  Constipation:    No   Diarrhea:   No   Nausea:   No   Vomiting:   No   Kidney/side pain:  No   Back pain:   No  Frequent headaches:  No   Dizziness:     No  Leg swelling:   No   Calf pain:    No    Parents, brothers or sisters with history of kidney cancer?   No  Parents, brothers or sisters with history of bladder cancer: No    "

## 2018-07-26 ENCOUNTER — OFFICE VISIT (OUTPATIENT)
Dept: OBGYN | Facility: OTHER | Age: 23
End: 2018-07-26
Attending: OBSTETRICS & GYNECOLOGY
Payer: MEDICAID

## 2018-07-26 VITALS
HEART RATE: 79 BPM | BODY MASS INDEX: 20.49 KG/M2 | WEIGHT: 112 LBS | OXYGEN SATURATION: 100 % | SYSTOLIC BLOOD PRESSURE: 98 MMHG | DIASTOLIC BLOOD PRESSURE: 56 MMHG

## 2018-07-26 DIAGNOSIS — Z34.91 PREGNANCY WITH UNCERTAIN DATES IN FIRST TRIMESTER: ICD-10-CM

## 2018-07-26 DIAGNOSIS — Z30.2 ENCOUNTER FOR STERILIZATION: ICD-10-CM

## 2018-07-26 DIAGNOSIS — Z34.81 ENCOUNTER FOR SUPERVISION OF OTHER NORMAL PREGNANCY, FIRST TRIMESTER: Primary | ICD-10-CM

## 2018-07-26 DIAGNOSIS — O34.219 PREVIOUS CESAREAN DELIVERY, ANTEPARTUM CONDITION OR COMPLICATION: ICD-10-CM

## 2018-07-26 DIAGNOSIS — N36.8: ICD-10-CM

## 2018-07-26 PROCEDURE — 76817 TRANSVAGINAL US OBSTETRIC: CPT | Mod: TC | Performed by: OBSTETRICS & GYNECOLOGY

## 2018-07-26 PROCEDURE — G0463 HOSPITAL OUTPT CLINIC VISIT: HCPCS | Mod: 25 | Performed by: OBSTETRICS & GYNECOLOGY

## 2018-07-26 PROCEDURE — 76817 TRANSVAGINAL US OBSTETRIC: CPT | Mod: 26 | Performed by: OBSTETRICS & GYNECOLOGY

## 2018-07-26 PROCEDURE — 99207 ZZC FIRST OB VISIT: CPT | Mod: 25 | Performed by: OBSTETRICS & GYNECOLOGY

## 2018-07-26 ASSESSMENT — PAIN SCALES - GENERAL: PAINLEVEL: MILD PAIN (3)

## 2018-07-26 NOTE — NURSING NOTE
"Chief Complaint   Patient presents with     Prenatal Care     prenew       Initial BP 98/56  Pulse 79  Wt 112 lb (50.8 kg)  LMP  (LMP Unknown)  SpO2 100%  BMI 20.49 kg/m2 Estimated body mass index is 20.49 kg/(m^2) as calculated from the following:    Height as of 7/25/18: 5' 2\" (1.575 m).    Weight as of this encounter: 112 lb (50.8 kg).  Medication Reconciliation: complete    Lee Ann Limon LPN  "

## 2018-07-26 NOTE — PROGRESS NOTES
HPI:  Gerald Evans is a 23 year old female (, TWINS-CS)  No LMP recorded (lmp unknown). Patient is pregnant. at Unknown, Estimated Date of Delivery: Data Unavailable.  She denies vaginal bleeding, vomiting and abdominal pain.  + nausea and RLQ pain with coughing. H/o ectopic and left salpingectomy.  Had US in ED 1 last week showing early IUP but no FCA.  Conceived on BCP.  Desires sterilization after this pregnancy.  Previous twin pregnancy complicated by IUGR and  emergency LTCS for non reassuring fetal status in [place]. + tobacco trying to cut down.  Denies ETOH or illicit drug use.  No other c/o.    No past medical history on file.    Past Surgical History:   Procedure Laterality Date     BIOPSY OF SKIN LESION        SECTION  2016    .     ENT SURGERY      adenoidectomy     ENT SURGERY      anesthesia for tooth work     LAPAROSCOPIC SALPINGECTOMY Left 2014    Procedure: LAPAROSCOPIC SALPINGECTOMY;  Surgeon: Leonel Bethea MD;  Location: [facility]       Allergies: Adderall; Amphetamine aspartate; Amphetamine sulfate; Amphetamine-dextroamphetamine; Dextroamphetamine; and Buspar [buspirone]     EXAM:  Blood pressure 98/56, pulse 79, weight 112 lb (50.8 kg), SpO2 100 %, unknown if currently breastfeeding.   BMI= Body mass index is 20.49 kg/(m^2).  General - pleasant female in no acute distress.  Abdomen - soft, nontender, nondistended, no hepatosplenomegaly.  Pelvic - EG: normal adult female, ,Rectovaginal - deferred.  2cm NT, Mobile skene's duct cyst    US:    Transvaginal:Yes  Yolk sac present:Yes  CRL:  3.6mm  FCA present:Yes  EGA 6w 0d  VIRGILIO:3/21/19  1.5 cm hemorrhagic appearing right ovarian cyst          ASSESSMENT/PLAN:  (Z34.81) Encounter for supervision of other normal pregnancy, first trimester  (primary encounter diagnosis)  Comment: Viablile IUP with US EDC 3/21/19  Plan: ABO/Rh type and screen, HIV Antigen Antibody         Combo, Rubella Antibody IgG  Quantitative,         Hepatitis B surface antigen, Treponema Abs w         Reflex to RPR and Titer, Urine Culture Aerobic         Bacterial, UA with Microscopic, CBC with         platelets, Drug Screen Urine (Range)          Prior CD:  Counseled on CATHERINE//sterilization.  Considering repeat CD and BTO.  H/o IUGR-  Serial US growht  Counseled on tobacco cessation  Right ovarian cyst.  Likely CL of pregnancy  Handouts on OTC treatment of pregnancy n/v reviewed with pt.        1st Trimester precautions and testing discussed.  New OB Labs ordered and exam scheduled.  Aneuploidy testing options discussed.  Patient has my card and phone number to call prn if problems in interim.    45 minutes were spent with the patient with greater than 50% of the visit spent in face-to-face counseling and coordination of care.        Caleb Kulkarni

## 2018-07-26 NOTE — MR AVS SNAPSHOT
After Visit Summary   2018    Gerald Evans    MRN: [id number]           Patient Information     Date Of Birth          1995        Visit Information        Provider Department      2018 10:00 AM Caleb Kulkarni MD [facility] [place]        Today's Diagnoses     Encounter for supervision of other normal pregnancy, first trimester    -  1    Previous  delivery, antepartum condition or complication        Encounter for sterilization        Pregnancy with uncertain dates in first trimester          Care Instructions    F/u 4 weks          Follow-ups after your visit        Your next 10 appointments already scheduled     Aug 14, 2018  1:00 PM CDT   (Arrive by 12:45 PM)   New Prenatal with Marquita Somers NP   [facility] [place] ([facility] [place] )    [address]  [place] [address]   [phone]            May 08, 2019  2:45 PM CDT   (Arrive by 2:30 PM)   Return Visit with Edmar Sánchez MD   [facility] [place] ([facility] [place] )    [address]  [place] [address]   [phone]              Future tests that were ordered for you today     Open Future Orders        Priority Expected Expires Ordered    ABO/Rh type and screen Routine 2018    HIV Antigen Antibody Combo Routine 2018    Rubella Antibody IgG Quantitative Routine 2018    Hepatitis B surface antigen Routine 2018    Treponema Abs w Reflex to RPR and Titer Routine 2018    Urine Culture Aerobic Bacterial Routine 2018    UA with Microscopic Routine 2018    CBC with platelets Routine 2018    Drug Screen Urine (Range) Routine 2018            Who to contact     If you have questions or need follow up information about today's clinic visit or your  "schedule please contact [facility] directly at [phone].  Normal or non-critical lab and imaging results will be communicated to you by MyChart, letter or phone within 4 business days after the clinic has received the results. If you do not hear from us within 7 days, please contact the clinic through MyChart or phone. If you have a critical or abnormal lab result, we will notify you by phone as soon as possible.  Submit refill requests through Blooie or call your pharmacy and they will forward the refill request to us. Please allow 3 business days for your refill to be completed.          Additional Information About Your Visit        [employer]MeReachharBrave[employer]Talent Information     Bl[employer]ie lets you send messages to your doctor, view your test results, renew your prescriptions, schedule appointments and more. To sign up, go to [email or web address] . Click on \"Log in\" on the left side of the screen, which will take you to the Welcome page. Then click on \"Sign up Now\" on the right side of the page.     You will be asked to enter the access code listed below, as well as some personal information. Please follow the directions to create your username and password.     Your access code is: [email or web address]  Expires: 10/19/2018  2:28 PM     Your access code will  in 90 days. If you need help or a new code, please call your [place] clinic or [phone].        Care EveryWhere ID     This is your Care EveryWhere ID. This could be used by other organizations to access your [place] medical records  [id number]        Your Vitals Were     Pulse Last Period Pulse Oximetry BMI (Body Mass Index)          79 (LMP Unknown) 100% 20.49 kg/m2         Blood Pressure from Last 3 Encounters:   18 98/56   18 92/62   18 109/62    Weight from Last 3 Encounters:   18 112 lb (50.8 kg)   18 111 lb (50.3 kg)   18 110 lb (49.9 kg)               Primary Care Provider Office Phone # Fax #    Jennie " ROBERT Carter [phone]       [facility] [address]  [facility] [address]        Equal Access to Services     FÁTIMA MADDEN : Hadii aad ku hadshelbycatherine Rubio, wapaulieda ajayqadaha, qadaynata katamikada josereynaldo, nicole stout deontejb baileyrajiveddie crockett. So [facility] [phone].    ATENCIÓN: Si habla español, tiene a martinez disposición servicios gratuitos de asistencia lingüística. Llame al [phone].    We comply with applicable federal civil rights laws and [place] laws. We do not discriminate on the basis of race, color, national origin, age, disability, sex, sexual orientation, or gender identity.            Thank you!     Thank you for choosing [facility]  for your care. Our goal is always to provide you with excellent care. Hearing back from our patients is one way we can continue to improve our services. Please take a few minutes to complete the written survey that you may receive in the mail after your visit with us. Thank you!             Your Updated Medication List - Protect others around you: Learn how to safely use, store and throw away your medicines at www.disposemymeds.org.          This list is accurate as of 7/26/18 12:55 PM.  Always use your most recent med list.                   Brand Name Dispense Instructions for use Diagnosis    prenatal multivitamin plus iron 27-0.8 MG Tabs per tablet     100 tablet    Take 1 tablet by mouth daily    Normal pregnancy in first trimester       TYLENOL PO      Take 1,000 mg by mouth

## 2018-07-26 NOTE — PROGRESS NOTES
HPI  Ms. Evans is a 23 year old female is a consult for concern regarding enlarged [place] gland that her boyfriend noticed.  Patient presents as a consult for enlarged [place] gland.  This is completely asymptomatic and she voids without complaint.  I explained this is not something I would typically be managing on a routine basis.  She is 9 weeks pregnant.  She has ongoing OB care including routine pelvic exams.  For this reason I did not perform a pelvic exam and she will follow-up with her OB per routine.      Past Medical History  Patient Active Problem List   Diagnosis     Hidradenitis suppurativa     Smoker     Blood type, Rh negative     Loose stools     Grief reaction     Anxiety and depression     Mild intermittent asthma     Need for Tdap vaccination     Need for immunization against influenza     NO SHOW     Encounter for triage in pregnant patient     Heartburn     Subchorionic hemorrhage in first trimester     Short interval between pregnancies affecting pregnancy in first trimester, antepartum     Vaginal bleeding     Previous  delivery, antepartum condition or complication     Previous  delivery, antepartum     Dyspareunia in female     History of ectopic pregnancy     History of      History of alcoholism (H)     Dysmenorrhea     Papanicolaou smear of cervix with low grade squamous intraepithelial lesion (LGSIL)     Allergic rhinitis due to other allergen     Anemia     Attention deficit hyperactivity disorder (ADHD)     Constipation     Esophageal reflux     Exercise-induced asthma     Migraine with aura     Oppositional defiant disorder     Panic disorder without agoraphobia       Past Surgical History  She  has a past surgical history that includes ENT surgery; ENT surgery; biopsy of skin lesion (); Laparoscopic salpingectomy (Left, 2014); and  section (2016).    Medications  She has a current medication list which includes the following  "prescription(s): acetaminophen and prenatal multivitamin plus iron.    Allergies  Allergies   Allergen Reactions     Adderall      Amphetamine Aspartate Other (See Comments)     Aggressive  Adderall       Amphetamine Sulfate Other (See Comments)     Aggressive  Adderall     Amphetamine-Dextroamphetamine      Other reaction(s): Other - Describe In Comment Field  Gets violent     Dextroamphetamine Other (See Comments)     Aggressive  Adderall     Buspar [Buspirone] Rash       Social History  She  reports that she has been smoking Cigarettes.  She has a 3.50 pack-year smoking history. She has never used smokeless tobacco. She reports that she drinks alcohol. She reports that she does not use illicit drugs.  No drug abuse.    Family History  Family History   Problem Relation Age of Onset     HEART DISEASE Mother      stent placement     C.A.D. Mother      Diabetes Paternal Grandmother      Hypertension Paternal Grandmother        Review of Systems  I personally reviewed the ROS with the patient.    Nursing Notes:   Jenna Leo LPN  7/25/2018  3:39 PM  Signed  Chief Complaint   Patient presents with     Consult     Regarding skeins gland swollen per Dr Fam. Patient is aprox 7 weeks pregnant per patient.        Initial BP 92/62  Pulse 89  Temp 98.5  F (36.9  C) (Tympanic)  Resp 16  Ht 1.575 m (5' 2\")  Wt 50.3 kg (111 lb)  LMP  (LMP Unknown)  SpO2 99%  BMI 20.3 kg/m2 Estimated body mass index is 20.3 kg/(m^2) as calculated from the following:    Height as of this encounter: 1.575 m (5' 2\").    Weight as of this encounter: 50.3 kg (111 lb).  Medication Reconciliation: complete    Jenna Leo LPN  Review of Systems:    Weight loss:    No     Recent fever/chills:  No   Night sweats:   No  Current skin rash:  No   Recent hair loss:  No  Heat intolerance:  No   Cold intolerance:  No  Chest pain:   No   Palpitations:   No  Shortness of breath:  No   Wheezing:   No  Constipation: " "   No   Diarrhea:   No   Nausea:   No   Vomiting:   No   Kidney/side pain:  No   Back pain:   No  Frequent headaches:  No   Dizziness:     No  Leg swelling:   No   Calf pain:    No    Parents, brothers or sisters with history of kidney cancer?   No  Parents, brothers or sisters with history of bladder cancer: No      Physical Exam  Vitals:    07/25/18 1431   BP: 92/62   Pulse: 89   Resp: 16   Temp: 98.5  F (36.9  C)   TempSrc: Tympanic   SpO2: 99%   Weight: 50.3 kg (111 lb)   Height: 1.575 m (5' 2\")     Constitutional: NAD, WDWN  Cardiovascular: Regular rate  Pulmonary/Chest: Respirations are even and non-labored bilaterally  Abdominal: Soft with no distension, tenderness, masses, guarding or CVA tenderness  Skin: Pink, warm, dry with no rash  Genitourinary: nonpalpable bladder    Labs  Results for orders placed or performed during the hospital encounter of 07/21/18   US OB < 14 Weeks w Transvaginal    Narrative    US OB <14 WEEKS WITH TRANSVAGINAL SINGLE    HISTORY: 23 yearsFemale rule in IUP reportedly at 7 weeks by  yesterdays quant HCG;     TECHNIQUE: Transabdominal transvaginal pelvic ultrasonography was  performed. Grayscale and color duplex technique was utilized.    COMPARISON: None    FINDINGS: A small intrauterine fluid collection is visualized. Comment  appearance is of a gestational sac. A yolk sac is visible. No fetal  pole or heart activity is detected at this time.    The right ovary is 3.4 x 2.3 x 2.5 cm. Question presence of a right  corpus luteum cyst.    The left ovary is not well visualized.      Impression    IMPRESSION: Intrauterine gestational sac is visualized. Mean sac  diameter 0.8 cm correlating with a 5 week 4 day gestation. No fetal  pole or heart activity is detected this time.    LYNDSEY HAWK MD   CBC with platelets differential   Result Value Ref Range    WBC 6.2 4.0 - 11.0 10e9/L    RBC Count 4.34 3.8 - 5.2 10e12/L    Hemoglobin 12.7 11.7 - 15.7 g/dL    Hematocrit 36.9 35.0 - " 47.0 %    MCV 85 78 - 100 fl    MCH 29.3 26.5 - 33.0 pg    MCHC 34.4 31.5 - 36.5 g/dL    RDW 13.2 10.0 - 15.0 %    Platelet Count 260 150 - 450 10e9/L    Diff Method Automated Method     % Neutrophils 49.0 %    % Lymphocytes 40.5 %    % Monocytes 9.2 %    % Eosinophils 0.8 %    % Basophils 0.3 %    % Immature Granulocytes 0.2 %    Nucleated RBCs 0 0 /100    Absolute Neutrophil 3.0 1.6 - 8.3 10e9/L    Absolute Lymphocytes 2.5 0.8 - 5.3 10e9/L    Absolute Monocytes 0.6 0.0 - 1.3 10e9/L    Absolute Eosinophils 0.1 0.0 - 0.7 10e9/L    Absolute Basophils 0.0 0.0 - 0.2 10e9/L    Abs Immature Granulocytes 0.0 0 - 0.4 10e9/L    Absolute Nucleated RBC 0.0    HCG quantitative pregnancy   Result Value Ref Range    HCG Quantitative Serum 5700 (H) 0 - 5 IU/L       Assessment  Ms. Evans is a 23 year old female with asymptomatic and large [place] gland.  Patient presents as a consult for enlarged [place] gland.  This is completely asymptomatic and she voids without complaint.  I explained this is not something I would typically be managing on a routine basis.  She is 9 weeks pregnant.  She has ongoing OB care including routine pelvic exams.  For this reason I did not perform a pelvic exam and she will follow-up with her OB per routine.    Plan  Follow-up with me as needed  Follow-up with current OB as scheduled

## 2018-08-08 ENCOUNTER — TELEPHONE (OUTPATIENT)
Dept: OBGYN | Facility: OTHER | Age: 23
End: 2018-08-08

## 2018-08-08 DIAGNOSIS — M54.9 MID BACK PAIN: Primary | ICD-10-CM

## 2018-08-08 DIAGNOSIS — Z34.90 EARLY STAGE OF PREGNANCY: ICD-10-CM

## 2018-08-08 NOTE — TELEPHONE ENCOUNTER
Pt is 7 weeks pregnant and states she is having mid back pain up into her shoulders up to a 6 or 7 on pain scale. State started a few days after seen on 7/26/18 and tylenol does not help, has also tried heat and rest with no success. Denies UTI symptoms, no vaginal bleeding or discharge. Please advise

## 2018-08-14 ENCOUNTER — PRENATAL OFFICE VISIT (OUTPATIENT)
Dept: OBGYN | Facility: OTHER | Age: 23
End: 2018-08-14
Attending: NURSE PRACTITIONER
Payer: MEDICAID

## 2018-08-14 VITALS — WEIGHT: 108 LBS | DIASTOLIC BLOOD PRESSURE: 50 MMHG | SYSTOLIC BLOOD PRESSURE: 88 MMHG | BODY MASS INDEX: 19.75 KG/M2

## 2018-08-14 DIAGNOSIS — Z34.81 ENCOUNTER FOR SUPERVISION OF OTHER NORMAL PREGNANCY, FIRST TRIMESTER: Primary | ICD-10-CM

## 2018-08-14 DIAGNOSIS — O34.219 PREVIOUS CESAREAN DELIVERY, ANTEPARTUM CONDITION OR COMPLICATION: ICD-10-CM

## 2018-08-14 DIAGNOSIS — Z34.81 ENCOUNTER FOR SUPERVISION OF OTHER NORMAL PREGNANCY, FIRST TRIMESTER: ICD-10-CM

## 2018-08-14 LAB
ABO + RH BLD: NORMAL
ABO + RH BLD: NORMAL
ALBUMIN UR-MCNC: NEGATIVE MG/DL
AMPHETAMINES UR QL SCN: NEGATIVE
APPEARANCE UR: CLEAR
BACTERIA #/AREA URNS HPF: ABNORMAL /HPF
BARBITURATES UR QL: NEGATIVE
BENZODIAZ UR QL: NEGATIVE
BILIRUB UR QL STRIP: NEGATIVE
BLD GP AB SCN SERPL QL: NORMAL
BLOOD BANK CMNT PATIENT-IMP: NORMAL
CANNABINOIDS UR QL SCN: NEGATIVE
COCAINE UR QL: NEGATIVE
COLOR UR AUTO: YELLOW
ERYTHROCYTE [DISTWIDTH] IN BLOOD BY AUTOMATED COUNT: 12.6 % (ref 10–15)
GLUCOSE UR STRIP-MCNC: NEGATIVE MG/DL
HCT VFR BLD AUTO: 37.9 % (ref 35–47)
HGB BLD-MCNC: 13.1 G/DL (ref 11.7–15.7)
HGB UR QL STRIP: NEGATIVE
KETONES UR STRIP-MCNC: NEGATIVE MG/DL
LEUKOCYTE ESTERASE UR QL STRIP: ABNORMAL
MCH RBC QN AUTO: 29 PG (ref 26.5–33)
MCHC RBC AUTO-ENTMCNC: 34.6 G/DL (ref 31.5–36.5)
MCV RBC AUTO: 84 FL (ref 78–100)
METHADONE UR QL SCN: NEGATIVE
MUCOUS THREADS #/AREA URNS LPF: PRESENT /LPF
NITRATE UR QL: NEGATIVE
OPIATES UR QL SCN: NEGATIVE
PCP UR QL SCN: NEGATIVE
PH UR STRIP: 6 PH (ref 4.7–8)
PLATELET # BLD AUTO: 268 10E9/L (ref 150–450)
RBC # BLD AUTO: 4.51 10E12/L (ref 3.8–5.2)
RBC #/AREA URNS AUTO: 1 /HPF (ref 0–2)
SOURCE: ABNORMAL
SP GR UR STRIP: 1.01 (ref 1–1.03)
SPECIMEN EXP DATE BLD: NORMAL
SQUAMOUS #/AREA URNS AUTO: 1 /HPF (ref 0–1)
UROBILINOGEN UR STRIP-MCNC: NORMAL MG/DL (ref 0–2)
WBC # BLD AUTO: 7.1 10E9/L (ref 4–11)
WBC #/AREA URNS AUTO: 1 /HPF (ref 0–5)

## 2018-08-14 PROCEDURE — 99000 SPECIMEN HANDLING OFFICE-LAB: CPT | Performed by: OBSTETRICS & GYNECOLOGY

## 2018-08-14 PROCEDURE — 86762 RUBELLA ANTIBODY: CPT | Mod: ZL | Performed by: OBSTETRICS & GYNECOLOGY

## 2018-08-14 PROCEDURE — 87086 URINE CULTURE/COLONY COUNT: CPT | Mod: ZL | Performed by: OBSTETRICS & GYNECOLOGY

## 2018-08-14 PROCEDURE — 85027 COMPLETE CBC AUTOMATED: CPT | Mod: ZL | Performed by: OBSTETRICS & GYNECOLOGY

## 2018-08-14 PROCEDURE — G0499 HEPB SCREEN HIGH RISK INDIV: HCPCS | Mod: ZL | Performed by: OBSTETRICS & GYNECOLOGY

## 2018-08-14 PROCEDURE — 86780 TREPONEMA PALLIDUM: CPT | Mod: ZL | Performed by: OBSTETRICS & GYNECOLOGY

## 2018-08-14 PROCEDURE — 87389 HIV-1 AG W/HIV-1&-2 AB AG IA: CPT | Mod: ZL | Performed by: OBSTETRICS & GYNECOLOGY

## 2018-08-14 PROCEDURE — 86901 BLOOD TYPING SEROLOGIC RH(D): CPT | Mod: ZL | Performed by: OBSTETRICS & GYNECOLOGY

## 2018-08-14 PROCEDURE — 87340 HEPATITIS B SURFACE AG IA: CPT | Performed by: OBSTETRICS & GYNECOLOGY

## 2018-08-14 PROCEDURE — 81001 URINALYSIS AUTO W/SCOPE: CPT | Mod: ZL,59 | Performed by: OBSTETRICS & GYNECOLOGY

## 2018-08-14 PROCEDURE — 36415 COLL VENOUS BLD VENIPUNCTURE: CPT | Mod: ZL | Performed by: OBSTETRICS & GYNECOLOGY

## 2018-08-14 PROCEDURE — 99207 ZZC PRENATAL VISIT: CPT | Performed by: NURSE PRACTITIONER

## 2018-08-14 PROCEDURE — 86900 BLOOD TYPING SEROLOGIC ABO: CPT | Mod: ZL | Performed by: OBSTETRICS & GYNECOLOGY

## 2018-08-14 PROCEDURE — G0463 HOSPITAL OUTPT CLINIC VISIT: HCPCS | Performed by: NURSE PRACTITIONER

## 2018-08-14 PROCEDURE — 80307 DRUG TEST PRSMV CHEM ANLYZR: CPT | Mod: ZL | Performed by: OBSTETRICS & GYNECOLOGY

## 2018-08-14 PROCEDURE — 86850 RBC ANTIBODY SCREEN: CPT | Mod: ZL | Performed by: OBSTETRICS & GYNECOLOGY

## 2018-08-14 ASSESSMENT — PAIN SCALES - GENERAL: PAINLEVEL: NO PAIN (0)

## 2018-08-14 NOTE — MR AVS SNAPSHOT
After Visit Summary   2018    Gerald Evans    MRN: [id number]           Patient Information     Date Of Birth          1995        Visit Information        Provider Department      2018 1:00 PM Marquita Somers NP [facility]        Today's Diagnoses     Encounter for supervision of other normal pregnancy, first trimester    -  1    Previous  delivery, antepartum condition or complication          Care Instructions    RTC 4 weeks          Follow-ups after your visit        Your next 10 appointments already scheduled     Sep 11, 2018 11:00 AM CDT   (Arrive by 10:45 AM)   ESTABLISHED PRENATAL with Caleb Kulkarni MD   [facility] ([facility] )    [address] [facility] [address] [facility] [place] [address]   [phone]            May 08, 2019  2:45 PM CDT   (Arrive by 2:30 PM)   Return Visit with Edmar Sánchez MD   [facility] ([facility] )    [address] [facility] [place] [address]   [phone]              Who to contact     If you have questions or need follow up information about today's clinic visit or your schedule please contact [facility] directly at [phone].  Normal or non-critical lab and imaging results will be communicated to you by MyChart, letter or phone within 4 business days after the clinic has received the results. If you do not hear from us within 7 days, please contact the clinic through MyChart or phone. If you have a critical or abnormal lab result, we will notify you by phone as soon as possible.  Submit refill requests through Pixable or call your pharmacy and they will forward the refill request to us. Please allow 3 business days for your refill to be completed.          Additional Information About Your Visit        MyChart Information     Pixable lets you send messages to your doctor, view your test results, renew your prescriptions, schedule appointments and more. To sign up,  "go to [email or web address] . Click on \"Log in\" on the left side of the screen, which will take you to the Welcome page. Then click on \"Sign up Now\" on the right side of the page.     You will be asked to enter the access code listed below, as well as some personal information. Please follow the directions to create your username and password.     Your access code is: [email or web address]  Expires: 10/19/2018  2:28 PM     Your access code will  in 90 days. If you need help or a new code, please call your [place] clinic or [phone].        Care EveryWhere ID     This is your Care EveryWhere ID. This could be used by other organizations to access your [place] medical records  [id number]        Your Vitals Were     Last Period BMI (Body Mass Index)                (LMP Unknown) 19.75 kg/m2           Blood Pressure from Last 3 Encounters:   18 (!) 88/50   18 98/56   18 92/62    Weight from Last 3 Encounters:   18 108 lb (49 kg)   18 112 lb (50.8 kg)   18 111 lb (50.3 kg)              We Performed the Following     GC/Chlamydia by PCR - [facility],        Primary Care Provider Office Phone # Fax Gilmer Schneider ROBERT Carter [phone]       [facility] [address] [facility] [address]        Equal Access to Services     [facility]: Hadii che douglass hadasho Soheatherali, waaxda luqadaha, qaybta kaalmada trever, nicole barlow . So [facility] [phone].    ATENCIÓN: Si habla español, tiene a martinez disposición servicios gratuitos de asistencia lingüística. Llame al [phone].    We comply with applicable federal civil rights laws and [place] laws. We do not discriminate on the basis of race, color, national origin, age, disability, sex, sexual orientation, or gender identity.            Thank you!     Thank you for choosing [facility]  for your care. Our goal is always to provide you with excellent care. Hearing back from " our patients is one way we can continue to improve our services. Please take a few minutes to complete the written survey that you may receive in the mail after your visit with us. Thank you!             Your Updated Medication List - Protect others around you: Learn how to safely use, store and throw away your medicines at www.disposemymeds.org.          This list is accurate as of 8/14/18 11:59 PM.  Always use your most recent med list.                   Brand Name Dispense Instructions for use Diagnosis    prenatal multivitamin plus iron 27-0.8 MG Tabs per tablet     100 tablet    Take 1 tablet by mouth daily    Normal pregnancy in first trimester       TYLENOL PO      Take 1,000 mg by mouth

## 2018-08-16 LAB
BACTERIA SPEC CULT: ABNORMAL
HBV SURFACE AG SERPL QL IA: NONREACTIVE
HIV 1+2 AB+HIV1 P24 AG SERPL QL IA: NONREACTIVE
RUBV IGG SERPL IA-ACNC: 62 IU/ML
SPECIMEN SOURCE: ABNORMAL
T PALLIDUM AB SER QL: NONREACTIVE

## 2018-08-16 NOTE — PROGRESS NOTES
HPI:  Gerald Evans is a 23 year old female No LMP recorded (lmp unknown). Patient is pregnant. at 9w0d, Estimated Date of Delivery: Mar 21, 2019.  She denies vaginal bleeding, vomiting and abdominal pain. Hx of LSIL pap 2018 without further evaluation.  Plan colposcopy.   No other c/o.    No past medical history on file.    Past Surgical History:   Procedure Laterality Date     BIOPSY OF SKIN LESION        SECTION  2016    [facility].     ENT SURGERY      adenoidectomy     ENT SURGERY      anesthesia for tooth work     LAPAROSCOPIC SALPINGECTOMY Left 2014    Procedure: LAPAROSCOPIC SALPINGECTOMY;  Surgeon: Leonel Bethea MD;  Location: [facility] OR       Allergies: Adderall; Amphetamine aspartate; Amphetamine sulfate; Amphetamine-dextroamphetamine; Dextroamphetamine; and Buspar [buspirone]     EXAM:  Blood pressure (!) 88/50, weight 108 lb (49 kg), unknown if currently breastfeeding.   BMI= Body mass index is 19.75 kg/(m^2).  General - pleasant female in no acute distress.  Neck - supple without lymphadenopathy or thyromegaly.  Lungs - clear to auscultation bilaterally.  Heart - regular rate and rhythm without murmur.  Abdomen - soft, nontender, nondistended, no hepatosplenomegaly.  Pelvic - EG: normal adult female, BUS: within normal limits, Vagina: well rugated, no discharge, Cervix: no lesions or CMT, closed/long Uterus: gravid, consistant with dates, mobile, Adnexae: no masses or tenderness.  Rectovaginal - deferred.  Musculoskeletal - no gross deformities.  Neurological - normal strength, sensation, and mental status.    Doptones were +    ASSESSMENT/PLAN:  (Z34.81) Encounter for supervision of other normal pregnancy, first trimester  (primary encounter diagnosis)  Comment:   Plan: GC/Chlamydia by PCR - [facility]        RTC 4 weeks      Weight gain and exercise during pregnancy was discussed at today's visit.  The patient will return to clinic in 4 weeks for continued prenatal care.

## 2018-08-17 ASSESSMENT — PATIENT HEALTH QUESTIONNAIRE - PHQ9: SUM OF ALL RESPONSES TO PHQ QUESTIONS 1-9: 4

## 2018-08-23 ENCOUNTER — PRENATAL OFFICE VISIT (OUTPATIENT)
Dept: OBGYN | Facility: OTHER | Age: 23
End: 2018-08-23
Attending: OBSTETRICS & GYNECOLOGY
Payer: MEDICAID

## 2018-08-23 VITALS
DIASTOLIC BLOOD PRESSURE: 58 MMHG | SYSTOLIC BLOOD PRESSURE: 98 MMHG | BODY MASS INDEX: 20.12 KG/M2 | HEART RATE: 83 BPM | OXYGEN SATURATION: 100 % | WEIGHT: 110 LBS

## 2018-08-23 DIAGNOSIS — O23.41 URINARY TRACT INFECTION IN MOTHER DURING FIRST TRIMESTER OF PREGNANCY: Primary | ICD-10-CM

## 2018-08-23 DIAGNOSIS — O99.891 PREGNANCY RELATED BILATERAL LOWER ABDOMINAL CRAMPING, ANTEPARTUM: ICD-10-CM

## 2018-08-23 DIAGNOSIS — R82.90 ABNORMAL URINE FINDINGS: ICD-10-CM

## 2018-08-23 DIAGNOSIS — R10.30 PREGNANCY RELATED BILATERAL LOWER ABDOMINAL CRAMPING, ANTEPARTUM: ICD-10-CM

## 2018-08-23 LAB
ALBUMIN UR-MCNC: 10 MG/DL
APPEARANCE UR: CLEAR
BACTERIA #/AREA URNS HPF: ABNORMAL /HPF
BILIRUB UR QL STRIP: NEGATIVE
COLOR UR AUTO: YELLOW
GLUCOSE UR STRIP-MCNC: NEGATIVE MG/DL
HGB UR QL STRIP: NEGATIVE
KETONES UR STRIP-MCNC: NEGATIVE MG/DL
LEUKOCYTE ESTERASE UR QL STRIP: ABNORMAL
MUCOUS THREADS #/AREA URNS LPF: PRESENT /LPF
NITRATE UR QL: NEGATIVE
PH UR STRIP: 6.5 PH (ref 4.7–8)
RBC #/AREA URNS AUTO: 4 /HPF (ref 0–2)
SOURCE: ABNORMAL
SP GR UR STRIP: 1.01 (ref 1–1.03)
SQUAMOUS #/AREA URNS AUTO: 4 /HPF (ref 0–1)
UROBILINOGEN UR STRIP-MCNC: NORMAL MG/DL (ref 0–2)
WBC #/AREA URNS AUTO: 3 /HPF (ref 0–5)

## 2018-08-23 PROCEDURE — 87086 URINE CULTURE/COLONY COUNT: CPT | Mod: ZL | Performed by: OBSTETRICS & GYNECOLOGY

## 2018-08-23 PROCEDURE — 81001 URINALYSIS AUTO W/SCOPE: CPT | Mod: ZL | Performed by: OBSTETRICS & GYNECOLOGY

## 2018-08-23 PROCEDURE — G0463 HOSPITAL OUTPT CLINIC VISIT: HCPCS | Performed by: OBSTETRICS & GYNECOLOGY

## 2018-08-23 PROCEDURE — 99207 ZZC COMPLICATED OB VISIT: CPT | Performed by: OBSTETRICS & GYNECOLOGY

## 2018-08-23 RX ORDER — NITROFURANTOIN 25; 75 MG/1; MG/1
100 CAPSULE ORAL 2 TIMES DAILY WITH MEALS
Qty: 14 CAPSULE | Refills: 0 | Status: SHIPPED | OUTPATIENT
Start: 2018-08-23 | End: 2018-09-11

## 2018-08-23 ASSESSMENT — PAIN SCALES - GENERAL: PAINLEVEL: MILD PAIN (3)

## 2018-08-23 NOTE — MR AVS SNAPSHOT
After Visit Summary   8/23/2018    Gerald Evans    MRN: [id number]           Patient Information     Date Of Birth          1995        Visit Information        Provider Department      8/23/2018 1:30 PM Frow, David Franke, MD [facility]        Today's Diagnoses     Urinary tract infection in mother during first trimester of pregnancy--Macrobid x 7--8/24/2018    -  1    Pregnancy related bilateral lower abdominal cramping, antepartum--8/24/2018          Care Instructions    UTI dietary guidelines given  Pelvic rest.  Keep ENMANUEL visit with Dr. Kulkarni next week          Follow-ups after your visit        Follow-up notes from your care team     Return in about 1 week (around 8/30/2018) for ENMANUEL with Dr. Kulkarni.      Your next 10 appointments already scheduled     Sep 11, 2018 11:00 AM CDT   (Arrive by 10:45 AM)   ESTABLISHED PRENATAL with Caleb Kulkarni MD   [facility] ([facility] [place] )    [address]  [place] [address]   [phone]            May 08, 2019  2:45 PM CDT   (Arrive by 2:30 PM)   Return Visit with Edmar Sánchez MD   [facility] ([facility] [place] )    [address]  [place] [address]   [phone]              Who to contact     If you have questions or need follow up information about today's clinic visit or your schedule please contact [facility] directly at [phone].  Normal or non-critical lab and imaging results will be communicated to you by MyChart, letter or phone within 4 business days after the clinic has received the results. If you do not hear from us within 7 days, please contact the clinic through MyChart or phone. If you have a critical or abnormal lab result, we will notify you by phone as soon as possible.  Submit refill requests through Rezzie or call your pharmacy and they will forward the refill request to us. Please allow 3 business days for your refill to be  completed.          Additional Information About Your Visit        Care EveryWhere ID     This is your Care EveryWhere ID. This could be used by other organizations to access your [place] medical records  [id number]        Your Vitals Were     Pulse Last Period Pulse Oximetry BMI (Body Mass Index)          83 (LMP Unknown) 100% 20.12 kg/m2         Blood Pressure from Last 3 Encounters:   08/23/18 98/58   08/14/18 (!) 88/50   07/26/18 98/56    Weight from Last 3 Encounters:   08/23/18 110 lb (49.9 kg)   08/14/18 108 lb (49 kg)   07/26/18 112 lb (50.8 kg)              We Performed the Following     UA with Microscopic reflex to Culture          Today's Medication Changes          These changes are accurate as of 8/23/18  1:50 PM.  If you have any questions, ask your nurse or doctor.               Start taking these medicines.        Dose/Directions    nitroFURantoin (macrocrystal-monohydrate) 100 MG capsule   Commonly known as:  MACROBID   Used for:  Urinary tract infection in mother during first trimester of pregnancy   Started by:  Frow, David Franke, MD        Dose:  100 mg   Take 1 capsule (100 mg) by mouth 2 times daily (with meals)   Quantity:  14 capsule   Refills:  0            Where to get your medicines      These medications were sent to [facility] [place] [facility] [address]  [address] [place] [facility] [place] [address]     Phone:  [phone]     nitroFURantoin (macrocrystal-monohydrate) 100 MG capsule                Primary Care Provider Office Phone # Fax #    Jennie Carter [phone]       [facility] [address]  [facility] [address]        Equal Access to Services     [facility] : Hadii che thomas Soomaali, waaxda luqadaha, qaybta kaalmada adeegyada, nicole crockett. So [facility] [phone].    ATENCIÓN: Si habla español, tiene a martinez disposición servicios gratuitos de asistencia lingüística. Llame al [phone].    We  comply with applicable federal civil rights laws and [place] laws. We do not discriminate on the basis of race, color, national origin, age, disability, sex, sexual orientation, or gender identity.            Thank you!     Thank you for choosing [facility]  for your care. Our goal is always to provide you with excellent care. Hearing back from our patients is one way we can continue to improve our services. Please take a few minutes to complete the written survey that you may receive in the mail after your visit with us. Thank you!             Your Updated Medication List - Protect others around you: Learn how to safely use, store and throw away your medicines at www.disposemymeds.org.          This list is accurate as of 8/23/18  1:50 PM.  Always use your most recent med list.                   Brand Name Dispense Instructions for use Diagnosis    nitroFURantoin (macrocrystal-monohydrate) 100 MG capsule    MACROBID    14 capsule    Take 1 capsule (100 mg) by mouth 2 times daily (with meals)    Urinary tract infection in mother during first trimester of pregnancy       prenatal multivitamin plus iron 27-0.8 MG Tabs per tablet     100 tablet    Take 1 tablet by mouth daily    Normal pregnancy in first trimester       TYLENOL PO      Take 1,000 mg by mouth

## 2018-08-23 NOTE — PROGRESS NOTES
S:    Having some lower cramping and urinary frequency.   Denies any bleeding.  O:   US show IUP with good FM and good FCA at 140's  A:    UTI  P:    Urinalysis,  UTI dietary guidelines given,  Macrobid x 7 days      DFF

## 2018-08-23 NOTE — NURSING NOTE
"Chief Complaint   Patient presents with     Prenatal Care     cramping/ 10w0d       Initial BP 98/58 (BP Location: Left arm, Cuff Size: Adult Regular)  Pulse 83  Wt 110 lb (49.9 kg)  LMP  (LMP Unknown)  SpO2 100%  BMI 20.12 kg/m2 Estimated body mass index is 20.12 kg/(m^2) as calculated from the following:    Height as of 7/25/18: 5' 2\" (1.575 m).    Weight as of this encounter: 110 lb (49.9 kg).  Medication Reconciliation: complete    Lee Ann Limon LPN  "

## 2018-08-25 LAB
BACTERIA SPEC CULT: ABNORMAL
SPECIMEN SOURCE: ABNORMAL

## 2018-09-11 ENCOUNTER — PRENATAL OFFICE VISIT (OUTPATIENT)
Dept: OBGYN | Facility: OTHER | Age: 23
End: 2018-09-11
Attending: NURSE PRACTITIONER
Payer: COMMERCIAL

## 2018-09-11 VITALS
HEIGHT: 62 IN | WEIGHT: 113 LBS | OXYGEN SATURATION: 98 % | SYSTOLIC BLOOD PRESSURE: 100 MMHG | BODY MASS INDEX: 20.8 KG/M2 | HEART RATE: 88 BPM | DIASTOLIC BLOOD PRESSURE: 60 MMHG

## 2018-09-11 DIAGNOSIS — R87.612 PAPANICOLAOU SMEAR OF CERVIX WITH LOW GRADE SQUAMOUS INTRAEPITHELIAL LESION (LGSIL): ICD-10-CM

## 2018-09-11 DIAGNOSIS — T78.40XA ALLERGIC STATE, INITIAL ENCOUNTER: ICD-10-CM

## 2018-09-11 DIAGNOSIS — R10.2 PELVIC CRAMPING: ICD-10-CM

## 2018-09-11 DIAGNOSIS — R30.0 DYSURIA: Primary | ICD-10-CM

## 2018-09-11 DIAGNOSIS — O34.219 PREVIOUS CESAREAN DELIVERY, ANTEPARTUM CONDITION OR COMPLICATION: ICD-10-CM

## 2018-09-11 LAB
ALBUMIN UR-MCNC: 10 MG/DL
APPEARANCE UR: ABNORMAL
BACTERIA #/AREA URNS HPF: ABNORMAL /HPF
BILIRUB UR QL STRIP: NEGATIVE
COLOR UR AUTO: YELLOW
GLUCOSE UR STRIP-MCNC: NEGATIVE MG/DL
HGB UR QL STRIP: NEGATIVE
KETONES UR STRIP-MCNC: NEGATIVE MG/DL
LEUKOCYTE ESTERASE UR QL STRIP: ABNORMAL
MUCOUS THREADS #/AREA URNS LPF: PRESENT /LPF
NITRATE UR QL: NEGATIVE
PH UR STRIP: 5.5 PH (ref 4.7–8)
RBC #/AREA URNS AUTO: 3 /HPF (ref 0–2)
SOURCE: ABNORMAL
SP GR UR STRIP: 1.02 (ref 1–1.03)
SQUAMOUS #/AREA URNS AUTO: 5 /HPF (ref 0–1)
UROBILINOGEN UR STRIP-MCNC: 2 MG/DL (ref 0–2)
WBC #/AREA URNS AUTO: 9 /HPF (ref 0–5)

## 2018-09-11 PROCEDURE — 57452 EXAM OF CERVIX W/SCOPE: CPT | Performed by: OBSTETRICS & GYNECOLOGY

## 2018-09-11 PROCEDURE — G0463 HOSPITAL OUTPT CLINIC VISIT: HCPCS | Mod: 25

## 2018-09-11 PROCEDURE — 99213 OFFICE O/P EST LOW 20 MIN: CPT | Mod: 25 | Performed by: OBSTETRICS & GYNECOLOGY

## 2018-09-11 PROCEDURE — 81001 URINALYSIS AUTO W/SCOPE: CPT | Mod: ZL | Performed by: OBSTETRICS & GYNECOLOGY

## 2018-09-11 PROCEDURE — 87086 URINE CULTURE/COLONY COUNT: CPT | Mod: ZL | Performed by: OBSTETRICS & GYNECOLOGY

## 2018-09-11 RX ORDER — LORATADINE 10 MG/1
10 TABLET ORAL DAILY
Qty: 90 TABLET | Refills: 3 | Status: SHIPPED | OUTPATIENT
Start: 2018-09-11 | End: 2020-01-25

## 2018-09-11 RX ORDER — AMOXICILLIN 500 MG/1
500 CAPSULE ORAL 3 TIMES DAILY
Qty: 30 CAPSULE | Refills: 0 | Status: SHIPPED | OUTPATIENT
Start: 2018-09-11 | End: 2018-10-09

## 2018-09-11 ASSESSMENT — PAIN SCALES - GENERAL: PAINLEVEL: NO PAIN (0)

## 2018-09-11 NOTE — MR AVS SNAPSHOT
After Visit Summary   9/11/2018    Gerald Evans    MRN: [id number]           Patient Information     Date Of Birth          1995        Visit Information        Provider Department      9/11/2018 11:00 AM Caleb Kulkarni MD [facility]        Today's Diagnoses     Dysuria    -  1    Pelvic cramping        Allergic state, initial encounter        Papanicolaou smear of cervix with low grade squamous intraepithelial lesion (LGSIL)          Care Instructions    F/u 4 weeks          Follow-ups after your visit        Your next 10 appointments already scheduled     Oct 09, 2018  2:45 PM CDT   (Arrive by 2:30 PM)   ESTABLISHED PRENATAL with Caleb Kulkarni MD   [facility] [place] )    [address]  [place] [address]   [phone]            May 08, 2019  2:45 PM CDT   (Arrive by 2:30 PM)   Return Visit with Edmar Sánchez MD   [facility] ([facility] [place] )    [address]  [place] [address]   [phone]              Who to contact     If you have questions or need follow up information about today's clinic visit or your schedule please contact [facility] directly at [phone].  Normal or non-critical lab and imaging results will be communicated to you by MyChart, letter or phone within 4 business days after the clinic has received the results. If you do not hear from us within 7 days, please contact the clinic through MyChart or phone. If you have a critical or abnormal lab result, we will notify you by phone as soon as possible.  Submit refill requests through [employer] or call your pharmacy and they will forward the refill request to us. Please allow 3 business days for your refill to be completed.          Additional Information About Your Visit        Care EveryWhere ID     This is your Care EveryWhere ID. This could be used by other organizations to access your [facility]  "records  [id number]        Your Vitals Were     Pulse Height Last Period Pulse Oximetry BMI (Body Mass Index)       88 5' 2\" (1.575 m) (LMP Unknown) 98% 20.67 kg/m2        Blood Pressure from Last 3 Encounters:   09/11/18 100/60   08/23/18 98/58   08/14/18 (!) 88/50    Weight from Last 3 Encounters:   09/11/18 113 lb (51.3 kg)   08/23/18 110 lb (49.9 kg)   08/14/18 108 lb (49 kg)              We Performed the Following     COLP CERVIX/UPPER VAGINA     UA with Microscopic reflex to Culture     Urine Culture Aerobic Bacterial          Today's Medication Changes          These changes are accurate as of 9/11/18 11:53 AM.  If you have any questions, ask your nurse or doctor.               Start taking these medicines.        Dose/Directions    amoxicillin 500 MG capsule   Commonly known as:  AMOXIL   Used for:  Dysuria        Dose:  500 mg   Take 1 capsule (500 mg) by mouth 3 times daily   Quantity:  30 capsule   Refills:  0       loratadine 10 MG tablet   Commonly known as:  CLARITIN   Used for:  Allergic state, initial encounter        Dose:  10 mg   Take 1 tablet (10 mg) by mouth daily   Quantity:  90 tablet   Refills:  3            Where to get your medicines      These medications were sent to [facility] - [place] - [address]  [address] [facility] [place] [address]     Phone:  [phone]     amoxicillin 500 MG capsule    loratadine 10 MG tablet                Primary Care Provider Office Phone # Fax #    Jennie Carter, ROBERT [phone]       [facility] [address]  [facility] [address]        Equal Access to Services     [facility]: Hadii che douglass hadashcatherine Sosusan, waaxda luqadaha, qaybta kaalmada adeegyada, nicole crockett. So [facility] [phone].    ATENCIÓN: Si habla español, tiene a martinez disposición servicios gratuitos de asistencia lingüística. Llame al [phone].    We comply with applicable federal civil rights laws and " [place] laws. We do not discriminate on the basis of race, color, national origin, age, disability, sex, sexual orientation, or gender identity.            Thank you!     Thank you for choosing [facility]  for your care. Our goal is always to provide you with excellent care. Hearing back from our patients is one way we can continue to improve our services. Please take a few minutes to complete the written survey that you may receive in the mail after your visit with us. Thank you!             Your Updated Medication List - Protect others around you: Learn how to safely use, store and throw away your medicines at www.disposemymeds.org.          This list is accurate as of 9/11/18 11:53 AM.  Always use your most recent med list.                   Brand Name Dispense Instructions for use Diagnosis    amoxicillin 500 MG capsule    AMOXIL    30 capsule    Take 1 capsule (500 mg) by mouth 3 times daily    Dysuria       loratadine 10 MG tablet    CLARITIN    90 tablet    Take 1 tablet (10 mg) by mouth daily    Allergic state, initial encounter       prenatal multivitamin plus iron 27-0.8 MG Tabs per tablet     100 tablet    Take 1 tablet by mouth daily    Normal pregnancy in first trimester       TYLENOL PO      Take 1,000 mg by mouth

## 2018-09-11 NOTE — NURSING NOTE
"Chief Complaint   Patient presents with     Colposcopy     Prenatal Care     12 weeks and 5 days       Initial /60 (BP Location: Right arm, Cuff Size: Adult Regular)  Pulse 88  Ht 5' 2\" (1.575 m)  Wt 113 lb (51.3 kg)  LMP  (LMP Unknown)  SpO2 98%  BMI 20.67 kg/m2 Estimated body mass index is 20.67 kg/(m^2) as calculated from the following:    Height as of this encounter: 5' 2\" (1.575 m).    Weight as of this encounter: 113 lb (51.3 kg).  Medication Reconciliation: complete    Margarita Candelario LPN  "

## 2018-09-11 NOTE — PROGRESS NOTES
Doing well.  No concerns today except cramping/fequency/urgeny/incomplete emptying.  Was treated for UTI but symptoms have not resolved.  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  Pt had LGSIL pap and presents for colposcopy.  + FHT's 150.  No VB, vaginitis sx.     PROCEDURE:  Before the procedure, it was ensured that the patient was educated regarding the nature of her findings to date, the implications, and what was to be done. She has been made aware of the role of HPV, the natural history of infection, ways to minimize her future risk, the effect of HPV on the cervix, and treatment options available should they be indicated.  The details of the colposcopic procedure were reviewed.  All questions were answered before proceeding, and informed consent was therefore obtained.    Speculum placed in vagina and excellent visualization of cervix   acheived, cervix swabbed x 3 with acetic acid and Lugol's solution. Colposcopy was satisfactory and the entire transformation zone seen.    FINDINGS:  Cervix: acetowhitening noted 12 and 6 oclock    Pap repeated?:  No  SCJ seen?:  yes    ECC done?:  No  Satisfactory examination?:  yes      ASSESSMENT: HPV cervicitis related changes and HERRERA 1.    PLAN: Repeat pap smear 6 weeks postpartum    Discussed quad screening. Patient wishes to discuss with SO and defer until next appt   US for full fetal anatomical survey ordered.  Level 2.   Return to clinic in 4 weeks    UA + culture ordered for dysuria  Claritin rx for GREG.     Caleb Kulkarni MD  9/11/2018

## 2018-09-13 LAB
BACTERIA SPEC CULT: ABNORMAL
SPECIMEN SOURCE: ABNORMAL

## 2018-09-14 DIAGNOSIS — Z98.891 H/O CESAREAN SECTION: Primary | ICD-10-CM

## 2018-09-19 ENCOUNTER — TELEPHONE (OUTPATIENT)
Dept: OBGYN | Facility: OTHER | Age: 23
End: 2018-09-19

## 2018-09-19 ENCOUNTER — HOSPITAL ENCOUNTER (EMERGENCY)
Facility: HOSPITAL | Age: 23
Discharge: HOME | End: 2018-09-19
Attending: PHYSICIAN ASSISTANT | Admitting: PHYSICIAN ASSISTANT
Payer: COMMERCIAL

## 2018-09-19 VITALS
SYSTOLIC BLOOD PRESSURE: 112 MMHG | RESPIRATION RATE: 18 BRPM | BODY MASS INDEX: 20.85 KG/M2 | TEMPERATURE: 98.6 F | DIASTOLIC BLOOD PRESSURE: 64 MMHG | OXYGEN SATURATION: 100 % | HEART RATE: 106 BPM | WEIGHT: 114 LBS

## 2018-09-19 DIAGNOSIS — Z53.21 PATIENT LEFT WITHOUT BEING SEEN: ICD-10-CM

## 2018-09-19 PROCEDURE — 40000268 ZZH STATISTIC NO CHARGES

## 2018-09-19 NOTE — TELEPHONE ENCOUNTER
Patient called to ask what meds she can take as she is feeling sick. Daughter dx with bronchitis yesterday. Please call her.

## 2018-09-20 NOTE — ED PROVIDER NOTES
"  History     Chief Complaint   Patient presents with     URI     \"symptoms started yesterday, is 14 weeks pregnant\"      Abdominal Pain     \"14 weeks pregnant, having abdominal cramping on and off for the last couple of days, denies vaginal bleeding\"      HPI  Gerald Evans is a 23 year old female who left without being seen.       Problem List:    Patient Active Problem List    Diagnosis Date Noted     Pregnancy related bilateral lower abdominal cramping, antepartum--2018     Priority: Medium     Urinary tract infection in mother during first trimester of pregnancy--Macrobid x 7--2018     Priority: Medium     Encounter for supervision of other normal pregnancy, first trimester 2018     Priority: Medium     Encounter for sterilization 2018     Priority: Medium     [place]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  Considering repeat and BTO.  H/o panic d/o and recurring Gen anesthesia with prior cd  Serial US growth (h/o IUGR)  Level 2 US for placentation/anantomy  Needs sterilization forms signed.   Plan breastfeeding  Needs Tdap  H/o PTL (twins 31 weeks).  Consider hydroxyprogesterone 16 +weeks.        Previous  delivery, antepartum 2016     Priority: Medium     Subchorionic hemorrhage in first trimester 2016     Priority: Medium     Refusing rhogam       Short interval " "between pregnancies affecting pregnancy in first trimester, antepartum 06/13/2016     Priority: Medium     Vaginal bleeding 06/13/2016     Priority: Medium     Refusing rhogam despite counseling       Heartburn 12/01/2015     Priority: Medium     Encounter for triage in pregnant patient 11/24/2015     Priority: Medium     NO SHOW 10/26/2015     Priority: Medium     No showed Dr. Fam 10/16/15; 12/23/15; 1/27/16; 2/2/16; 2/24/16; 7/19/16; 8/3/16; 8/24/16  Letter sent regarding multiple missed appointments 12/23/15.         Need for immunization against influenza 09/15/2015     Priority: Medium     refuses       Anxiety and depression 08/17/2015     Priority: Medium     tools       Mild intermittent asthma 08/17/2015     Priority: Medium     Need for Tdap vaccination 08/17/2015     Priority: Medium     Refuses despite counseling       Grief reaction 08/12/2015     Priority: Medium     8/12/15: Mother \"on life-support in [place] with <10% chance survival\"       Loose stools 07/28/2015     Priority: Medium     Blood type, Rh negative 01/07/2015     Priority: Medium     Rhogam done 12/15/15       Hidradenitis suppurativa 09/08/2013     Priority: Medium     Smoker 09/08/2013     Priority: Medium     Trying to quit       Panic disorder without agoraphobia 08/20/2013     Priority: Medium     Overview:   [facility]       Esophageal reflux 03/13/2012     Priority: Medium     Anemia 02/10/2012     Priority: Medium     Overview:   IMO Update 10/11       Migraine with aura 12/27/2010     Priority: Medium     Overview:   IMO Update       Exercise-induced asthma 01/06/2009     Priority: Medium     Overview:   Mild       Allergic rhinitis due to other allergen 05/01/2008     Priority: Medium     Oppositional defiant disorder 02/01/2008     Priority: Medium     Overview:   IMO Update 10/11       Constipation 09/10/2007     Priority: Medium     Overview:   IMO Update       Attention deficit hyperactivity disorder (ADHD) 06/24/2003     " Priority: Medium     Overview:   IMO Update 10           Past Medical History:    No past medical history on file.    Past Surgical History:    Past Surgical History:   Procedure Laterality Date     BIOPSY OF SKIN LESION  2013      SECTION  2016    [facility].     ENT SURGERY      adenoidectomy     ENT SURGERY      anesthesia for tooth work     LAPAROSCOPIC SALPINGECTOMY Left 2014    Procedure: LAPAROSCOPIC SALPINGECTOMY;  Surgeon: Leonel Bethea MD;  Location: [facility] OR       Family History:    Family History   Problem Relation Age of Onset     HEART DISEASE Mother      stent placement     C.A.D. Mother      Diabetes Paternal Grandmother      Hypertension Paternal Grandmother        Social History:  Marital Status:  Single [1]  Social History   Substance Use Topics     Smoking status: Current Every Day Smoker     Packs/day: 0.50     Years: 7.00     Types: Cigarettes     Smokeless tobacco: Never Used     Alcohol use Yes      Comment: occas        Medications:      Acetaminophen (TYLENOL PO)   amoxicillin (AMOXIL) 500 MG capsule   loratadine (CLARITIN) 10 MG tablet   Prenatal Vit-Fe Fumarate-FA (PRENATAL MULTIVITAMIN PLUS IRON) 27-0.8 MG TABS per tablet         Review of Systems    Physical Exam   BP: 112/64  Pulse: 106  Temp: 98.6  F (37  C)  Resp: 18  Weight: 51.7 kg (114 lb)  SpO2: 100 %      Physical Exam    ED Course     ED Course     Procedures               Critical Care time:  none               No results found for this or any previous visit (from the past 24 hour(s)).    Medications - No data to display    Assessments & Plan (with Medical Decision Making)     I have reviewed the nursing notes.    I have reviewed the findings, diagnosis, plan and need for follow up with the patient.       Discharge Medication List as of 2018  7:33 PM          Final diagnoses:   Patient left without being seen       2018   [facility]     Tori Lepe PA-C  18 [id number]

## 2018-10-09 ENCOUNTER — PRENATAL OFFICE VISIT (OUTPATIENT)
Dept: OBGYN | Facility: OTHER | Age: 23
End: 2018-10-09
Attending: OBSTETRICS & GYNECOLOGY
Payer: COMMERCIAL

## 2018-10-09 VITALS
SYSTOLIC BLOOD PRESSURE: 109 MMHG | DIASTOLIC BLOOD PRESSURE: 61 MMHG | OXYGEN SATURATION: 98 % | HEART RATE: 86 BPM | WEIGHT: 115 LBS | HEIGHT: 62 IN | BODY MASS INDEX: 21.16 KG/M2

## 2018-10-09 DIAGNOSIS — K21.9 GASTROESOPHAGEAL REFLUX DISEASE, ESOPHAGITIS PRESENCE NOT SPECIFIED: Primary | ICD-10-CM

## 2018-10-09 DIAGNOSIS — O34.219 PREVIOUS CESAREAN DELIVERY, ANTEPARTUM CONDITION OR COMPLICATION: ICD-10-CM

## 2018-10-09 PROCEDURE — 99207 ZZC PRENATAL VISIT: CPT | Performed by: OBSTETRICS & GYNECOLOGY

## 2018-10-09 PROCEDURE — G0463 HOSPITAL OUTPT CLINIC VISIT: HCPCS

## 2018-10-09 RX ORDER — OMEPRAZOLE 10 MG/1
20 CAPSULE, DELAYED RELEASE ORAL DAILY
Qty: 60 CAPSULE | Refills: 3 | Status: SHIPPED | OUTPATIENT
Start: 2018-10-09 | End: 2018-11-16

## 2018-10-09 ASSESSMENT — PAIN SCALES - GENERAL: PAINLEVEL: NO PAIN (0)

## 2018-10-09 NOTE — PROGRESS NOTES
Doing well.  No concerns today.  Discussed quad screening. Patient wishes to defer  US for full fetal anatomical survey ordered.  Return to clinic in 4 weeks.  Declines Flu shot.  Counseled on PTL risk. Her delivery indication was not PTL with 31 week Twins.   Declines [facility].      Caleb Kulkarni MD  10/9/2018

## 2018-10-09 NOTE — MR AVS SNAPSHOT
After Visit Summary   10/9/2018    Gerald Evans    MRN: [id number]           Patient Information     Date Of Birth          1995        Visit Information        Provider Department      10/9/2018 2:45 PM Caleb Kulkarni MD [facility]        Today's Diagnoses     Gastroesophageal reflux disease, esophagitis presence not specified    -  1    Previous  delivery, antepartum condition or complication          Care Instructions    F/u 4 weeks          Follow-ups after your visit        Your next 10 appointments already scheduled     Oct 24, 2018  8:00 AM CDT   US OB TELEMEDICINE LEVEL II COMPREHENSIVE SINGLE with GHUS2   [facility] ([facility])    [address]  [place] [address] [phone]           Please bring a list of your medicines (including vitamins, minerals and over-the-counter drugs). Also, tell your doctor about any allergies you may have. Wear comfortable clothes and leave your valuables at home.  If you're less than 20 weeks drink four 8-ounce glasses of fluid an hour before your exam. If you need to empty your bladder before your exam, try to release only a little urine. Then, drink another glass of fluid.  We do not allow the use of cameras or video during the exam.  The sonographer will be happy to provide take home pictures.  You may have up to two adult family members in the exam room.  Although we encourage family participation, we ask that you consider not bringing young children to these appointments.  Background noise may interfere with the telemedicine connection.  Please call the Imaging Department at your exam site with any questions.            Oct 24, 2018  8:00 AM CDT   M TELEMEDICINE US COMPREHENSIVE SINGLE with URMFMUSTBARBIE   [facility] Maternal Fetal Medicine Ultrasound [facility] ([facility])    [address]  [facility]  "[id number]   [phone]            Oct 24, 2018  8:30 AM CDT   Radiology MD with UR DONNA LEHMAN   [facility] Maternal Fetal [facility] ([facility])    [address]  [place] [address]   [phone]           Please arrive at the time given for your first appointment. This visit is used internally to schedule the physician's time during your ultrasound.            Nov 06, 2018  2:45 PM CST   (Arrive by 2:30 PM)   ESTABLISHED PRENATAL with Caleb Kulkarni MD   [facility]    [address]  [place] [address]   [phone]            May 08, 2019  2:45 PM CDT   (Arrive by 2:30 PM)   Return Visit with Edmar Sánchez MD   [facility] )    [address]  [place] [address]   [phone]              Who to contact     If you have questions or need follow up information about today's clinic visit or your schedule please contact [facility] directly at [phone].  Normal or non-critical lab and imaging results will be communicated to you by MyChart, letter or phone within 4 business days after the clinic has received the results. If you do not hear from us within 7 days, please contact the clinic through MyChart or phone. If you have a critical or abnormal lab result, we will notify you by phone as soon as possible.  Submit refill requests through [employer] or call your pharmacy and they will forward the refill request to us. Please allow 3 business days for your refill to be completed.          Additional Information About Your Visit        Care EveryWhere ID     This is your Care EveryWhere ID. This could be used by other organizations to access your [place] medical records  [id number]        Your Vitals Were     Pulse Height Last Period Pulse Oximetry BMI (Body Mass Index)       86 5' 2\" (1.575 m) (LMP Unknown) 98% 21.03 kg/m2        " Blood Pressure from Last 3 Encounters:   10/09/18 109/61   09/19/18 112/64   09/11/18 100/60    Weight from Last 3 Encounters:   10/09/18 115 lb (52.2 kg)   09/19/18 114 lb (51.7 kg)   09/11/18 113 lb (51.3 kg)              Today, you had the following     No orders found for display         Today's Medication Changes          These changes are accurate as of 10/9/18  4:41 PM.  If you have any questions, ask your nurse or doctor.               These medicines have changed or have updated prescriptions.        Dose/Directions    omeprazole 10 MG CR capsule   Commonly known as:  priLOSEC   This may have changed:  how much to take   Used for:  Gastroesophageal reflux disease, esophagitis presence not specified        Dose:  20 mg   Take 2 capsules (20 mg) by mouth daily   Quantity:  60 capsule   Refills:  3            Where to get your medicines      These medications [facility] [place] [facility] [address] [facility]  [address] [place] [facility] [address]     Phone:  [phone]     omeprazole 10 MG CR capsule                Primary Care Provider Office Phone # Fax #    Jennie ROBERT Carter [phone]       [facility] [address]  [facility] [address]        Equal Access to Services     FÁTIMA MADDEN AH: Margie douglass hadshelbyo Soomaali, waaxda luqadaha, qaybta kaalmada adeegyada, nicole crockett. So [facility] [phone].    ATENCIÓN: Si habla español, tiene a martinez disposición servicios gratuitos de asistencia lingüística. Llame al [phone].    We comply with applicable federal civil rights laws and [place] laws. We do not discriminate on the basis of race, color, national origin, age, disability, sex, sexual orientation, or gender identity.            Thank you!     Thank you for choosing [facility]  for your care. Our goal is always to provide you with excellent care. Hearing back from our patients is one way we can  continue to improve our services. Please take a few minutes to complete the written survey that you may receive in the mail after your visit with us. Thank you!             Your Updated Medication List - Protect others around you: Learn how to safely use, store and throw away your medicines at www.disposemymeds.org.          This list is accurate as of 10/9/18  4:41 PM.  Always use your most recent med list.                   Brand Name Dispense Instructions for use Diagnosis    loratadine 10 MG tablet    CLARITIN    90 tablet    Take 1 tablet (10 mg) by mouth daily    Allergic state, initial encounter       omeprazole 10 MG CR capsule    priLOSEC    60 capsule    Take 2 capsules (20 mg) by mouth daily    Gastroesophageal reflux disease, esophagitis presence not specified       prenatal multivitamin plus iron 27-0.8 MG Tabs per tablet     100 tablet    Take 1 tablet by mouth daily    Normal pregnancy in first trimester       TYLENOL PO      Take 1,000 mg by mouth

## 2018-10-09 NOTE — NURSING NOTE
"Chief Complaint   Patient presents with     Prenatal Care     16 weeks and 5 days       Initial /61 (BP Location: Left arm, Cuff Size: Adult Regular)  Pulse 86  Ht 5' 2\" (1.575 m)  Wt 115 lb (52.2 kg)  LMP  (LMP Unknown)  SpO2 98%  BMI 21.03 kg/m2 Estimated body mass index is 21.03 kg/(m^2) as calculated from the following:    Height as of this encounter: 5' 2\" (1.575 m).    Weight as of this encounter: 115 lb (52.2 kg).  Medication Reconciliation: complete    Margarita Candelario LPN  "

## 2018-10-22 ENCOUNTER — TELEPHONE (OUTPATIENT)
Dept: OBGYN | Facility: OTHER | Age: 23
End: 2018-10-22

## 2018-10-24 ENCOUNTER — HOSPITAL ENCOUNTER (OUTPATIENT)
Dept: ULTRASOUND IMAGING | Facility: CLINIC | Age: 23
End: 2018-10-24
Attending: OBSTETRICS & GYNECOLOGY
Payer: COMMERCIAL

## 2018-10-24 ENCOUNTER — OFFICE VISIT (OUTPATIENT)
Dept: MATERNAL FETAL MEDICINE | Facility: CLINIC | Age: 23
End: 2018-10-24
Attending: OBSTETRICS & GYNECOLOGY
Payer: COMMERCIAL

## 2018-10-24 ENCOUNTER — HOSPITAL ENCOUNTER (OUTPATIENT)
Dept: ULTRASOUND IMAGING | Facility: OTHER | Age: 23
Discharge: HOME | End: 2018-10-24
Attending: OBSTETRICS & GYNECOLOGY | Admitting: FAMILY MEDICINE
Payer: COMMERCIAL

## 2018-10-24 DIAGNOSIS — O43.102 PLACENTAL ABNORMALITY IN SECOND TRIMESTER: Primary | ICD-10-CM

## 2018-10-24 DIAGNOSIS — Z98.891 H/O CESAREAN SECTION: ICD-10-CM

## 2018-10-24 NOTE — MR AVS SNAPSHOT
After Visit Summary   10/24/2018    Gerald Evans    MRN: [id number]           Patient Information     Date Of Birth          1995        Visit Information        Provider Department      10/24/2018 8:30 AM Amina Ramos DO [facility] Maternal Fetal Medicine [facility]        Today's Diagnoses     Placental abnormality in second trimester    -  1    H/O  section           Follow-ups after your visit        Your next 10 appointments already scheduled     2018  2:45 PM CST   (Arrive by 2:30 PM)   ESTABLISHED PRENATAL with Caleb [facility]    [address]  [place] [address]   [phone]            May 08, 2019  2:45 PM CDT   (Arrive by 2:30 PM)   Return Visit with Edmar Sánchez MD   [facility]    [address]  [place] [address]   [phone]              Who to contact     If you have questions or need follow up information about today's clinic visit or your schedule please contact [facility] MATERNAL FETAL MEDICINE [facility] directly at [phone].  Normal or non-critical lab and imaging results will be communicated to you by MyChart, letter or phone within 4 business days after the clinic has received the results. If you do not hear from us within 7 days, please contact the clinic through MyChart or phone. If you have a critical or abnormal lab result, we will notify you by phone as soon as possible.  Submit refill requests through [employer] or call your pharmacy and they will forward the refill request to us. Please allow 3 business days for your refill to be completed.          Additional Information About Your Visit        Care EveryWhere ID     This is your Care EveryWhere ID. This could be used by other organizations to access your [place] medical records  [id number]        Your Vitals Were     Last Period                   (LMP  Unknown)            Blood Pressure from Last 3 Encounters:   10/09/18 109/61   09/19/18 112/64   09/11/18 100/60    Weight from Last 3 Encounters:   10/09/18 52.2 kg (115 lb)   09/19/18 51.7 kg (114 lb)   09/11/18 51.3 kg (113 lb)              Today, you had the following     No orders found for display       Primary Care Provider Office Phone # Fax #    Jennie Correamaria fernandarajesh, ROBERT [phone]       [facility] [address]  [employer] [address]        Equal Access to Services     [place] campusBRITTNEY : Hadii che douglass hadandrew Sosusan, waaxda luqadaha, qaybta kaalmada adereynaldo, nicole barlow . So [facility] [phone].    ATENCIÓN: Si habla español, tiene a martinez disposición servicios gratuitos de asistencia lingüística. LlL[facility] [phone].    We comply with applicable federal civil rights laws and [place] laws. We do not discriminate on the basis of race, color, national origin, age, disability, sex, sexual orientation, or gender identity.            Thank you!     Thank you for choosing [facility]  for your care. Our goal is always to provide you with excellent care. Hearing back from our patients is one way we can continue to improve our services. Please take a few minutes to complete the written survey that you may receive in the mail after your visit with us. Thank you!             Your Updated Medication List - Protect others around you: Learn how to safely use, store and throw away your medicines at www.disposemymeds.org.          This list is accurate as of 10/24/18 10:31 AM.  Always use your most recent med list.                   Brand Name Dispense Instructions for use Diagnosis    loratadine 10 MG tablet    CLARITIN    90 tablet    Take 1 tablet (10 mg) by mouth daily    Allergic state, initial encounter       omeprazole 10 MG CR capsule    priLOSEC    60 capsule    Take 2 capsules (20 mg) by mouth daily    Gastroesophageal reflux  disease, esophagitis presence not specified       prenatal multivitamin plus iron 27-0.8 MG Tabs per tablet     100 tablet    Take 1 tablet by mouth daily    Normal pregnancy in first trimester       TYLENOL PO      Take 1,000 mg by mouth

## 2018-10-24 NOTE — PROGRESS NOTES
"Maternal Fetal Medicine Telemedicine Note    Type of service: Screening fetal ultrasound.      Date of service: Date: 10/24/18     Time service began: 8:00 AM  Time service ended: 9:30 AM    Reason: Patient convenience    Description of basis or telemedicine appropriateness:     Consultation provided at the request of Dr. Kulkarni for advice regarding the diagnosis and treatment of this patient's pregnancy.  The patient's condition can be safely assessed via telemedicine.    The Mode of Transmission:    Secure interactive audio and visual telecommunication system (Video Guidance)    Location of originating and distant sites:      Originating site: [place], [place]    Distant site: [place], [place]      Please see \"Imaging\" tab under \"Chart Review\" for details of today's US.      Amina Ramos, DO  Maternal Fetal Medicine      "

## 2018-10-25 ENCOUNTER — TELEPHONE (OUTPATIENT)
Dept: OBGYN | Facility: OTHER | Age: 23
End: 2018-10-25

## 2018-10-25 NOTE — TELEPHONE ENCOUNTER
Patient had a level 2 yesterday and was told she has placental lakes. She is wondering what this means and if there is anything she has to do for the rest of the pregnancy. Please advise

## 2018-10-25 NOTE — TELEPHONE ENCOUNTER
Placental lakes are  considered to be a normal finding in most cases. However, multiple placental lakes seen early in pregnancy have been associated with fetal growth restriction.  If placenta had been covering cervix or prior uterine incision it would have raised concern for placenta abnormality such as placenta accreta but it was  not.  Thus, f/u ultrasounds for will be performed to keep an eye on baby's growth but lusually of no clinical significance and common.

## 2018-11-06 ENCOUNTER — PRENATAL OFFICE VISIT (OUTPATIENT)
Dept: OBGYN | Facility: OTHER | Age: 23
End: 2018-11-06
Attending: OBSTETRICS & GYNECOLOGY
Payer: COMMERCIAL

## 2018-11-06 VITALS
WEIGHT: 118 LBS | DIASTOLIC BLOOD PRESSURE: 61 MMHG | HEIGHT: 62 IN | HEART RATE: 78 BPM | SYSTOLIC BLOOD PRESSURE: 98 MMHG | BODY MASS INDEX: 21.71 KG/M2 | OXYGEN SATURATION: 98 %

## 2018-11-06 DIAGNOSIS — O34.219 PREVIOUS CESAREAN DELIVERY, ANTEPARTUM CONDITION OR COMPLICATION: ICD-10-CM

## 2018-11-06 DIAGNOSIS — O43.102 PLACENTAL ABNORMALITY IN SECOND TRIMESTER: Primary | ICD-10-CM

## 2018-11-06 DIAGNOSIS — Z30.2 ENCOUNTER FOR STERILIZATION: ICD-10-CM

## 2018-11-06 PROCEDURE — G0463 HOSPITAL OUTPT CLINIC VISIT: HCPCS

## 2018-11-06 PROCEDURE — 99207 ZZC PRENATAL VISIT: CPT | Performed by: OBSTETRICS & GYNECOLOGY

## 2018-11-06 RX ORDER — CLINDAMYCIN HCL 300 MG
300 CAPSULE ORAL EVERY 6 HOURS
Refills: 0 | COMMUNITY
Start: 2018-10-29 | End: 2018-12-06

## 2018-11-06 ASSESSMENT — PAIN SCALES - GENERAL: PAINLEVEL: NO PAIN (0)

## 2018-11-06 NOTE — MR AVS SNAPSHOT
After Visit Summary   2018    Gerald Evans    MRN: [id number]           Patient Information     Date Of Birth          1995        Visit Information        Provider Department      2018 2:45 PM Caleb Kulkarni MD [facility]        Today's Diagnoses     Placental abnormality in second trimester    -  1    Previous  delivery, antepartum condition or complication        Encounter for sterilization          Care Instructions    F/u 4 wks          Follow-ups after your visit        Your next 10 appointments already scheduled     2018  3:00 PM CST   (Arrive by 2:45 PM)   US OB >14 WEEKS FOLLOW UP with HIUS2   [facility] ULTRASOUND ([facility] )    [address] [place] [address]   [phone]           How do I prepare for my exam? (Food and drink instructions) Drink four 8-ounce glasses of fluid an hour before your exam. If you need to empty your bladder before your exam, try to release only a little urine. Then, drink another glass of fluid.  How do I prepare for my exam? (Other instructions) You may have up to two family members in the exam room. If you bring a small child, an adult must be there to care for him or her. No video or camera photography during the procedure.  What should I wear: Wear comfortable clothes.  How long does the exam take: Most ultrasounds take 30 to 60 minutes.  What should I bring: Bring a list of your medicines, including vitamins, minerals and over-the-counter drugs. It is safest to leave personal items at home.  Do I need a :  No  is needed.  What do I need to tell my doctor: Tell your doctor about any allergies you may have.  What should I do after the exam: No restrictions, You may resume normal activities.  What is this test: An ultrasound uses sound waves to make pictures of the body. Sound waves do not cause pain. The only discomfort may be the pressure of the wand against your skin or full  "bladder.  Who should I call with questions: If you have any questions, please call the Imaging Department where you will have your exam. Directions, parking instructions, and other information is available on our website, [email or web address].            Dec 06, 2018  2:30 PM CST   (Arrive by 2:15 PM)   ESTABLISHED PRENATAL with Caleb Kulkarni MD   [facility] [place] ([facility] )    [address]  [place] [address]   [phone]            May 08, 2019  2:45 PM CDT   (Arrive by 2:30 PM)   Return Visit with Edmar Sánchez MD   [facility] ([facility] )    [address]  [place] [address]   [phone]              Who to contact     If you have questions or need follow up information about today's clinic visit or your schedule please contact [facility] directly at [phone].  Normal or non-critical lab and imaging results will be communicated to you by MyChart, letter or phone within 4 business days after the clinic has received the results. If you do not hear from us within 7 days, please contact the clinic through MyChart or phone. If you have a critical or abnormal lab result, we will notify you by phone as soon as possible.  Submit refill requests through [employer] or call your pharmacy and they will forward the refill request to us. Please allow 3 business days for your refill to be completed.          Additional Information About Your Visit        Care EveryWhere ID     This is your Care EveryWhere ID. This could be used by other organizations to access your [facility] medical records  [id number]        Your Vitals Were     Pulse Height Last Period Pulse Oximetry BMI (Body Mass Index)       78 5' 2\" (1.575 m) (LMP Unknown) 98% 21.58 kg/m2        Blood Pressure from Last 3 Encounters:   11/06/18 98/61   10/09/18 109/61   09/19/18 112/64    Weight from Last 3 Encounters:   11/06/18 118 lb (53.5 kg) "   10/09/18 115 lb (52.2 kg)   09/19/18 114 lb (51.7 kg)               Primary Care Provider Office Phone # Fax #    Jennie Correaquintonprincess ROBERT [phone]       [facility] MEDICINE [address]  [facility] [address]        Equal Access to Services     FÁTIMA MADDEN : Hadii aad ku hadasho Soomaali, waaxda luqadaha, qaybta kaalmada adeegyada, nicole kingin hayaan adeameya khrajiveddie barlow . So [facility] [phone].    ATENCIÓN: Si habla español, tiene a martinez disposición servicios gratuitos de asistencia lingüística. Llame al [phone].    We comply with applicable federal civil rights laws and [place] laws. We do not discriminate on the basis of race, color, national origin, age, disability, sex, sexual orientation, or gender identity.            Thank you!     Thank you for choosing [facility]  for your care. Our goal is always to provide you with excellent care. Hearing back from our patients is one way we can continue to improve our services. Please take a few minutes to complete the written survey that you may receive in the mail after your visit with us. Thank you!             Your Updated Medication List - Protect others around you: Learn how to safely use, store and throw away your medicines at www.disposemymeds.org.          This list is accurate as of 11/6/18 11:59 PM.  Always use your most recent med list.                   Brand Name Dispense Instructions for use Diagnosis    clindamycin 300 MG capsule    CLEOCIN     Take 300 mg by mouth every 6 hours        loratadine 10 MG tablet    CLARITIN    90 tablet    Take 1 tablet (10 mg) by mouth daily    Allergic state, initial encounter       omeprazole 10 MG CR capsule    priLOSEC    60 capsule    Take 2 capsules (20 mg) by mouth daily    Gastroesophageal reflux disease, esophagitis presence not specified       prenatal multivitamin plus iron 27-0.8 MG Tabs per tablet     100 tablet    Take 1 tablet by mouth daily    Normal  pregnancy in first trimester       TYLENOL PO      Take 1,000 mg by mouth

## 2018-11-06 NOTE — NURSING NOTE
"Chief Complaint   Patient presents with     Prenatal Care     20 weeks and 5 days       Initial BP 98/61 (BP Location: Left arm, Cuff Size: Adult Regular)  Pulse 78  Ht 5' 2\" (1.575 m)  Wt 118 lb (53.5 kg)  LMP  (LMP Unknown)  SpO2 98%  BMI 21.58 kg/m2 Estimated body mass index is 21.58 kg/(m^2) as calculated from the following:    Height as of this encounter: 5' 2\" (1.575 m).    Weight as of this encounter: 118 lb (53.5 kg).  Medication Reconciliation: complete    Margarita Candelario LPN  "

## 2018-11-10 NOTE — PROGRESS NOTES
Doing well.  No concerns today.  US for full fetal anatomical survey reviewed and f/u growth US ordered.   We reviewed the risks and benefits of tubal ligation including risks of bleeding, infection, damage to other organs. Risks of tubal failure, and possibility of ectopic pregnancy were also explained. If she felt she was pregnant in the future or had a positive pregnancy test,  she understands the need to contact a physician immediately. We discussed alternative forms of birth control including Ocp's, Depo-Provera,  condoms, diaphragm, patch vasectomy, Nexplanon, and IUD.  Federal sterilization request forms reviewed and signed.      Sterilization request forms reviewed in [facility].  Return to clinic in 4 weeks    Caleb Kulkarni MD  11/10/2018

## 2018-11-12 DIAGNOSIS — Z98.891 PREVIOUS CESAREAN SECTION: Primary | ICD-10-CM

## 2018-11-13 DIAGNOSIS — O43.102 PLACENTAL ABNORMALITY IN SECOND TRIMESTER: Primary | ICD-10-CM

## 2018-11-16 ENCOUNTER — PRENATAL OFFICE VISIT (OUTPATIENT)
Dept: OBGYN | Facility: OTHER | Age: 23
End: 2018-11-16
Attending: NURSE PRACTITIONER
Payer: COMMERCIAL

## 2018-11-16 VITALS
SYSTOLIC BLOOD PRESSURE: 100 MMHG | DIASTOLIC BLOOD PRESSURE: 58 MMHG | HEIGHT: 62 IN | BODY MASS INDEX: 22.08 KG/M2 | WEIGHT: 120 LBS

## 2018-11-16 DIAGNOSIS — O26.92 PREGNANCY RELATED CONDITION IN SECOND TRIMESTER: Primary | ICD-10-CM

## 2018-11-16 DIAGNOSIS — K21.9 GASTROESOPHAGEAL REFLUX DISEASE, ESOPHAGITIS PRESENCE NOT SPECIFIED: ICD-10-CM

## 2018-11-16 PROCEDURE — 99207 ZZC PRENATAL VISIT: CPT | Performed by: NURSE PRACTITIONER

## 2018-11-16 PROCEDURE — G0463 HOSPITAL OUTPT CLINIC VISIT: HCPCS | Performed by: NURSE PRACTITIONER

## 2018-11-16 RX ORDER — OMEPRAZOLE 10 MG/1
20 CAPSULE, DELAYED RELEASE ORAL DAILY
Qty: 60 CAPSULE | Refills: 3 | Status: SHIPPED | OUTPATIENT
Start: 2018-11-16 | End: 2021-07-27

## 2018-11-16 ASSESSMENT — PAIN SCALES - GENERAL: PAINLEVEL: MODERATE PAIN (4)

## 2018-11-16 NOTE — MR AVS SNAPSHOT
After Visit Summary   11/16/2018    Gerald Evans    MRN: [id number]           Patient Information     Date Of Birth          1995        Visit Information        Provider Department      11/16/2018 3:15 PM Marquita Somers NP [facility]        Today's Diagnoses     Pregnancy related condition in second trimester    -  1    Gastroesophageal reflux disease, esophagitis presence not specified          Care Instructions    RTC 4 weeks          Follow-ups after your visit        Your next 10 appointments already scheduled     Nov 28, 2018  3:00 PM CST   (Arrive by 2:45 PM)   US OB >14 WEEKS FOLLOW UP with HIUS2   HI ULTRASOUND ([facility] )    [address] [place] [address]   [phone]           How do I prepare for my exam? (Food and drink instructions) Drink four 8-ounce glasses of fluid. Finish drinking an hour before your exam, so you have a full bladder. If you need to empty your bladder before your exam, try to release only a little urine. Then, drink another glass of fluid.  How do I prepare for my exam? (Other instructions) You may have up to two family members in the exam room. If you bring a small child, an adult must be there to care for him or her. No video or camera photography during the procedure.  What should I wear: Wear comfortable clothes.  How long does the exam take: Most ultrasounds take 30 to 60 minutes.  What should I bring: Bring a list of your medicines, including vitamins, minerals and over-the-counter drugs. It is safest to leave personal items at home.  Do I need a :  No  is needed.  What do I need to tell my doctor: Tell your doctor about any allergies you may have.  What should I do after the exam: No restrictions, you may resume normal activities.  What is this test: An ultrasound uses sound waves to make pictures of the body. Sound waves do not cause pain. The only discomfort may be the pressure of the wand  against your skin or full bladder.  Who should I call with questions: If you have any questions, please call the Imaging Department where you will have your exam. Directions, parking instructions, and other information are available on our website, [email or web address].            Dec 06, 2018  2:30 PM CST   (Arrive by 2:15 PM)   ESTABLISHED PRENATAL with Caleb Kulkarni MD   [facility] )    [address] [facility] [address]   [phone]            Jan 08, 2019 11:00 AM CST   US OB TELEMEDICINE LEVEL II COMPREHENSIVE SINGLE with HIUS2   HI ULTRASOUND ([facility] )    [address] [facility] [address]   [phone]           Please bring a list of your medicines (including vitamins, minerals and over-the-counter drugs). Also, tell your doctor about any allergies you may have. Wear comfortable clothes and leave your valuables at home.  If you're less than 20 weeks drink four 8-ounce glasses of fluid an hour before your exam. If you need to empty your bladder before your exam, try to release only a little urine. Then, drink another glass of fluid.  We do not allow the use of cameras or video during the exam.  The sonographer will be happy to provide take home pictures.  You may have up to two adult family members in the exam room.  Although we encourage family participation, we ask that you consider not bringing young children to these appointments.  Background noise may interfere with the telemedicine connection.  Please call the Imaging Department at your exam site with any questions.            Jan 08, 2019 11:00 AM CST   MFM TELEMEDICINE US COMPREHENSIVE SINGLE with [email or web address]   [facility] ([facility])    [address]  [facility] [address]   [phone]            Jan 08, 2019 11:30 AM CST   Radiology MD with UR DONNA LEHMAN   [facility] [place] ([facility])    [address]  [place] [address]   [phone]           Please arrive at the time given for your first appointment. This visit is used internally to schedule the physician's time during your ultrasound.            May 08, 2019  2:45 PM CDT   (Arrive by 2:30 PM)   Return Visit with Edmar Sánchez MD   [facility] ([facility] )    [address]  [place] [address]   [phone]              Who to contact     If you have questions or need follow up information about today's clinic visit or your schedule please contact [facility] directly at [phone].  Normal or non-critical lab and imaging results will be communicated to you by MyChart, letter or phone within 4 business days after the clinic has received the results. If you do not hear from us within 7 days, please contact the clinic through MyChart or phone. If you have a critical or abnormal lab result, we will notify you by phone as soon as possible.  Submit refill requests through [employer] or call your pharmacy and they will forward the refill request to us. Please allow 3 business days for your refill to be completed.          Additional Information About Your Visit        Care EveryWhere ID     This is your Care EveryWhere ID. This could be used by other organizations to access your [facility] medical records  [id number]        Your Vitals Were     Height Last Period BMI (Body Mass Index)             5' 2\" (1.575 m) (LMP Unknown) 21.95 kg/m2          Blood Pressure from Last 3 Encounters:   11/17/18 101/60   11/16/18 100/58   11/06/18 98/61    Weight from Last 3 Encounters:   11/16/18 120 lb (54.4 kg)   11/06/18 118 lb (53.5 kg)   10/09/18 115 lb (52.2 kg)              Today, you had the following     No orders found for display         Where to get your medicines      These medications were sent to " [facility] [place] - [address]  [address] [facility] [address]     Phone:  [phone]     omeprazole 10 MG CR capsule          Primary Care Provider Office Phone # Fax #    Jennie CarterROBERT [phone]       [facility] [address]  [facility] [address]        Equal Access to Services     FÁTIMA MADDEN : Hadii aad ku hadasho Soomaali, waaxda luqadaha, qaybta kaalmada adeegyada, waxay idiin hayaan adeeg kharash lakasia . So [facility] [phone].    ATENCIÓN: Si habla español, tiene a martinez disposición servicios gratuitos de asistencia lingüística. Aniya al [phone].    We comply with applicable federal civil rights laws and [place] laws. We do not discriminate on the basis of race, color, national origin, age, disability, sex, sexual orientation, or gender identity.            Thank you!     Thank you for choosing [facility]  for your care. Our goal is always to provide you with excellent care. Hearing back from our patients is one way we can continue to improve our services. Please take a few minutes to complete the written survey that you may receive in the mail after your visit with us. Thank you!             Your Updated Medication List - Protect others around you: Learn how to safely use, store and throw away your medicines at www.disposemymeds.org.          This list is accurate as of 11/16/18 11:59 PM.  Always use your most recent med list.                   Brand Name Dispense Instructions for use Diagnosis    clindamycin 300 MG capsule    CLEOCIN     Take 300 mg by mouth every 6 hours        loratadine 10 MG tablet    CLARITIN    90 tablet    Take 1 tablet (10 mg) by mouth daily    Allergic state, initial encounter       omeprazole 10 MG CR capsule    priLOSEC    60 capsule    Take 2 capsules (20 mg) by mouth daily    Gastroesophageal reflux disease, esophagitis presence not specified       prenatal multivitamin plus  iron 27-0.8 MG Tabs per tablet     100 tablet    Take 1 tablet by mouth daily    Normal pregnancy in first trimester       TYLENOL PO      Take 1,000 mg by mouth

## 2018-11-17 ENCOUNTER — HOSPITAL ENCOUNTER (OUTPATIENT)
Facility: HOSPITAL | Age: 23
Discharge: HOME | End: 2018-11-18
Attending: OBSTETRICS & GYNECOLOGY | Admitting: OBSTETRICS & GYNECOLOGY
Payer: COMMERCIAL

## 2018-11-17 VITALS
SYSTOLIC BLOOD PRESSURE: 101 MMHG | TEMPERATURE: 99.9 F | OXYGEN SATURATION: 97 % | RESPIRATION RATE: 16 BRPM | DIASTOLIC BLOOD PRESSURE: 60 MMHG | HEART RATE: 91 BPM

## 2018-11-17 PROCEDURE — G0463 HOSPITAL OUTPT CLINIC VISIT: HCPCS

## 2018-11-17 NOTE — IP AVS SNAPSHOT
MRN:[id number]                      After Visit Summary   11/17/2018    Gerald Evans    MRN: [id number]           Thank you!     Thank you for choosing [facility] for your care. Our goal is always to provide you with excellent care. Hearing back from our patients is one way we can continue to improve our services. Please take a few minutes to complete the written survey that you may receive in the mail after you visit with us. Thank you!        Patient Information     Date Of Birth          1995        About your hospital stay     You were admitted on:  November 17, 2018 You last received care in the:  [facility]    You were discharged on:  November 18, 2018       Who to Call     For medical emergencies, please call 911.  For non-urgent questions about your medical care, please call your primary care provider or clinic, [phone]          Attending Provider     Provider Specialty    Caleb Kulkarni MD OB/Gyn       Primary Care Provider Office Phone # Fax #    Jennie ROBERT Carter [phone]      Your next 10 appointments already scheduled     Nov 28, 2018  3:00 PM CST   (Arrive by 2:45 PM)   US OB >14 WEEKS FOLLOW UP with HIUS2   HI ULTRASOUND ([facility] )    [address] [place] [address]   [phone]           How do I prepare for my exam? (Food and drink instructions) Drink four 8-ounce glasses of fluid an hour before your exam. If you need to empty your bladder before your exam, try to release only a little urine. Then, drink another glass of fluid.  How do I prepare for my exam? (Other instructions) You may have up to two family members in the exam room. If you bring a small child, an adult must be there to care for him or her. No video or camera photography during the procedure.  What should I wear: Wear comfortable clothes.  How long does the exam take: Most ultrasounds take 30 to 60 minutes.  What should I bring: Bring a list  of your medicines, including vitamins, minerals and over-the-counter drugs. It is safest to leave personal items at home.  Do I need a :  No  is needed.  What do I need to tell my doctor: Tell your doctor about any allergies you may have.  What should I do after the exam: No restrictions, You may resume normal activities.  What is this test: An ultrasound uses sound waves to make pictures of the body. Sound waves do not cause pain. The only discomfort may be the pressure of the wand against your skin or full bladder.  Who should I call with questions: If you have any questions, please call the Imaging Department where you will have your exam. Directions, parking instructions, and other information is available on our website, [email or web address].            Dec 06, 2018  2:30 PM CST   (Arrive by 2:15 PM)   ESTABLISHED PRENATAL with Caleb Kulkarni MD   [facility] )    [address]  [place] [address]   [phone]            May 08, 2019  2:45 PM CDT   (Arrive by 2:30 PM)   Return Visit with Edmar Sánchez MD   [facility]    [address]  [place] [address]   [phone]              Pending Results     No orders found for last 3 day(s).            Admission Information     Date & Time Provider Department Dept. Phone    11/17/2018 Caleb Kulkarni MD [facility] Labor and Delivery [phone]      Your Vitals Were     Blood Pressure Pulse Temperature Respirations Last Period Pulse Oximetry    101/60 91 99.9  F (37.7  C) (Oral) 16 (LMP Unknown) 97%      Care EveryWhere ID     This is your Care EveryWhere ID. This could be used by other organizations to access your [facility] medical records  [id number]        Equal Access to Services     FÁTIMA MADDEN : Margie Rubio, ce nur, balbir perera, nicole crockett. So [facility]  [phone].    ATENCIÓN: Si natacha thompson, tiene a martinez disposición servicios gratuitos de asistencia lingüística. Aniya gomes [phone].    We comply with applicable federal civil rights laws and [place] laws. We do not discriminate on the basis of race, color, national origin, age, disability, sex, sexual orientation, or gender identity.               Review of your medicines      UNREVIEWED medicines. Ask your doctor about these medicines        Dose / Directions    clindamycin 300 MG capsule   Commonly known as:  CLEOCIN        Dose:  300 mg   Take 300 mg by mouth every 6 hours   Refills:  0       loratadine 10 MG tablet   Commonly known as:  CLARITIN   Used for:  Allergic state, initial encounter        Dose:  10 mg   Take 1 tablet (10 mg) by mouth daily   Quantity:  90 tablet   Refills:  3       omeprazole 10 MG CR capsule   Commonly known as:  priLOSEC   Used for:  Gastroesophageal reflux disease, esophagitis presence not specified        Dose:  20 mg   Take 2 capsules (20 mg) by mouth daily   Quantity:  60 capsule   Refills:  3       prenatal multivitamin plus iron 27-0.8 MG Tabs per tablet   Used for:  Normal pregnancy in first trimester        Dose:  1 tablet   Take 1 tablet by mouth daily   Quantity:  100 tablet   Refills:  3       TYLENOL PO        Dose:  1000 mg   Take 1,000 mg by mouth   Refills:  0                Protect others around you: Learn how to safely use, store and throw away your medicines at www.disposemymeds.org.             Medication List: This is a list of all your medications and when to take them. Check marks below indicate your daily home schedule. Keep this list as a reference.      Medications           Morning Afternoon Evening Bedtime As Needed    clindamycin 300 MG capsule   Commonly known as:  CLEOCIN   Take 300 mg by mouth every 6 hours                                loratadine 10 MG tablet   Commonly known as:  CLARITIN   Take 1 tablet (10 mg) by mouth daily                                 omeprazole 10 MG CR capsule   Commonly known as:  priLOSEC   Take 2 capsules (20 mg) by mouth daily                                prenatal multivitamin plus iron 27-0.8 MG Tabs per tablet   Take 1 tablet by mouth daily                                TYLENOL PO   Take 1,000 mg by mouth

## 2018-11-17 NOTE — IP AVS SNAPSHOT
[facility] Labor and Delivery    [address] [address]    Phone:  [phone]    Fax:  [phone]                                       After Visit Summary   11/17/2018    Gerald Evans    MRN: [id number]           After Visit Summary Signature Page     I have received my discharge instructions, and my questions have been answered. I have discussed any challenges I see with this plan with the nurse or doctor.    ..........................................................................................................................................  Patient/Patient Representative Signature      ..........................................................................................................................................  Patient Representative Print Name and Relationship to Patient    ..................................................               ................................................  Date                                   Time    ..........................................................................................................................................  Reviewed by Signature/Title    ...................................................              ..............................................  Date                                               Time          22EPIC Rev 08/18

## 2018-11-18 NOTE — PLAN OF CARE
OB Triage Note  Gerald Evans  MRN: [id number]  Gestational Age: 22w3d      Gerald Evans presents for decreased fetal movement    Patient presented to unit with C/O decreased fetal movement since midnight the night before. Fetal monitor on and fetal movement heard and FHT of 130's noted.     Dr. Bethea notified of arrival and condition.  Oriented patient to surroundings. Call light within reach.      with adequate variability of this gestation. Patient denies contractions, vaginal bleeding or leakage of fluid      Plan:  [place] FHT. Alert Md of patient's arrival. Discharge home with fetal wellbeing assurance and  labor precautions

## 2018-11-20 NOTE — PROGRESS NOTES
Doing well.  Denies concerns.  No cramping or prieto.  US reviewed.  Reviewed signs of PTL.  RTC in 4 weeks.

## 2018-11-29 ENCOUNTER — HOSPITAL ENCOUNTER (OUTPATIENT)
Dept: ULTRASOUND IMAGING | Facility: HOSPITAL | Age: 23
Discharge: HOME | End: 2018-11-29
Attending: OBSTETRICS & GYNECOLOGY | Admitting: OBSTETRICS & GYNECOLOGY
Payer: COMMERCIAL

## 2018-11-29 DIAGNOSIS — O43.102 PLACENTAL ABNORMALITY IN SECOND TRIMESTER: ICD-10-CM

## 2018-11-29 PROCEDURE — 76816 OB US FOLLOW-UP PER FETUS: CPT | Mod: TC

## 2018-12-02 NOTE — TELEPHONE ENCOUNTER
OBSTETRIC PHONE TRIAGE      2018 4:41 PM  Gerald CMA Mogauravlinh 1995 [phone] (home) [phone] (work)   Telephone Information:   Mobile [phone]                    EDC  Gestational Age 24 3/7 weeks    Spoke with pt      Reason for call per pt:States has been nauseated and had a few loose stools    Are you having contractions? States doesn't think so  Are you having any bloody show/Bleeding? No  Are you leaking any fluids/has your water broken? No  Is your baby moving normally? Yes  Did you have any complications with this or a previous pregnancy? (Pre-term labor, C/S, Previa, pre-eclampsia, diabetes) previous csection of twins at 31 wks    Vaginal Discharge: no  Cervical Status: n/a      On tocolytic (if yes dose, frequency, last dose, pulse): no    Has patient palpated for contractions? no  Has patient spoken with MD/CNM? no    Patient Advised: That she is welcome to come to the hospital and be checked PRN    Prenatal reviewed: yes    Call taken by:  Tiff NEAL           Information:pt told not to hesitate to come in, if she wants to be seen

## 2018-12-04 ENCOUNTER — HOSPITAL ENCOUNTER (OUTPATIENT)
Facility: HOSPITAL | Age: 23
Discharge: HOME | End: 2018-12-04
Attending: OBSTETRICS & GYNECOLOGY | Admitting: OBSTETRICS & GYNECOLOGY
Payer: COMMERCIAL

## 2018-12-04 ENCOUNTER — TELEPHONE (OUTPATIENT)
Dept: OBGYN | Facility: HOSPITAL | Age: 23
End: 2018-12-04

## 2018-12-04 VITALS
DIASTOLIC BLOOD PRESSURE: 54 MMHG | HEIGHT: 62 IN | TEMPERATURE: 98.5 F | BODY MASS INDEX: 22.08 KG/M2 | RESPIRATION RATE: 16 BRPM | HEART RATE: 97 BPM | SYSTOLIC BLOOD PRESSURE: 97 MMHG | WEIGHT: 120 LBS | OXYGEN SATURATION: 97 %

## 2018-12-04 LAB
ALBUMIN UR-MCNC: 30 MG/DL
APPEARANCE UR: ABNORMAL
BACTERIA #/AREA URNS HPF: ABNORMAL /HPF
BILIRUB UR QL STRIP: NEGATIVE
COLOR UR AUTO: YELLOW
FIBRONECTIN FETAL VAG QL: NEGATIVE
GLUCOSE UR STRIP-MCNC: NEGATIVE MG/DL
HGB UR QL STRIP: NEGATIVE
KETONES UR STRIP-MCNC: NEGATIVE MG/DL
LEUKOCYTE ESTERASE UR QL STRIP: ABNORMAL
MUCOUS THREADS #/AREA URNS LPF: PRESENT /LPF
NITRATE UR QL: NEGATIVE
PH UR STRIP: 6.5 PH (ref 4.7–8)
RBC #/AREA URNS AUTO: 4 /HPF (ref 0–2)
RUPTURE OF FETAL MEMBRANES BY ROM PLUS: NEGATIVE
SOURCE: ABNORMAL
SP GR UR STRIP: 1.02 (ref 1–1.03)
SPECIMEN SOURCE: NORMAL
SQUAMOUS #/AREA URNS AUTO: 16 /HPF (ref 0–1)
UROBILINOGEN UR STRIP-MCNC: 2 MG/DL (ref 0–2)
WBC #/AREA URNS AUTO: 18 /HPF (ref 0–5)
WET PREP SPEC: NORMAL

## 2018-12-04 PROCEDURE — 59025 FETAL NON-STRESS TEST: CPT | Mod: 26 | Performed by: OBSTETRICS & GYNECOLOGY

## 2018-12-04 PROCEDURE — 87210 SMEAR WET MOUNT SALINE/INK: CPT | Performed by: OBSTETRICS & GYNECOLOGY

## 2018-12-04 PROCEDURE — G0463 HOSPITAL OUTPT CLINIC VISIT: HCPCS | Mod: 25

## 2018-12-04 PROCEDURE — 59025 FETAL NON-STRESS TEST: CPT

## 2018-12-04 PROCEDURE — 81001 URINALYSIS AUTO W/SCOPE: CPT | Performed by: OBSTETRICS & GYNECOLOGY

## 2018-12-04 PROCEDURE — 84112 EVAL AMNIOTIC FLUID PROTEIN: CPT | Performed by: OBSTETRICS & GYNECOLOGY

## 2018-12-04 PROCEDURE — 82731 ASSAY OF FETAL FIBRONECTIN: CPT | Performed by: OBSTETRICS & GYNECOLOGY

## 2018-12-04 NOTE — IP AVS SNAPSHOT
MRN:[id number]                      After Visit Summary   12/4/2018    Gerald Evans    MRN: [id number]           Thank you!     Thank you for choosing [facility] for your care. Our goal is always to provide you with excellent care. Hearing back from our patients is one way we can continue to improve our services. Please take a few minutes to complete the written survey that you may receive in the mail after you visit with us. Thank you!        Patient Information     Date Of Birth          1995        About your hospital stay     You were admitted on:  December 4, 2018 You last received care in the:  [facility]    You were discharged on:  December 4, 2018       Who to Call     For medical emergencies, please call 911.  For non-urgent questions about your medical care, please call your primary care provider or clinic, [phone]          Attending Provider     Provider Specialty    Rajan Duran MD OB/Gyn       Primary Care Provider Office Phone # Fax #    Jennie ROBERT Carter [phone]      Your next 10 appointments already scheduled     Dec 06, 2018  2:30 PM CST   (Arrive by 2:15 PM)   ESTABLISHED PRENATAL with Caleb Kulkarni MD   [facility] )    [address] [place] [address] [id number] [phone]            Jan 08, 2019 11:00 AM CST   US OB TELEMEDICINE LEVEL II COMPREHENSIVE SINGLE with HIUS2   [facility] ULTRASOUND ([facility] )    [address] [place] [address]   [phone]           Please bring a list of your medicines (including vitamins, minerals and over-the-counter drugs). Also, tell your doctor about any allergies you may have. Wear comfortable clothes and leave your valuables at home.  If you're less than 20 weeks drink four 8-ounce glasses of fluid an hour before your exam. If you need to empty your bladder before your exam, try to release only a little urine. Then,  drink another glass of fluid.  We do not allow the use of cameras or video during the exam.  The sonographer will be happy to provide take home pictures.  You may have up to two adult family members in the exam room.  Although we encourage family participation, we ask that you consider not bringing young children to these appointments.  Background noise may interfere with the telemedicine connection.  Please call the Imaging Department at your exam site with any questions.            Jan 08, 2019 11:00 AM HANK THOMPSON TELEMEDICINE US COMPREHENSIVE SINGLE with AFRICA   [facility] - [facility])    [address]  [facility] [address]   [phone]            Jan 08, 2019 11:30 AM HANK   Radiology MD with UR DONNA LEHMAN   [facility] Maternal Fetal Medicine - [facility])    [address]  [facility] [address]   [phone]           Please arrive at the time given for your first appointment. This visit is used internally to schedule the physician's time during your ultrasound.            May 08, 2019  2:45 PM CDT   (Arrive by 2:30 PM)   Return Visit with Edmar Sánchez MD   [facility]    [address] [facility] [facility] [address]   [phone]              Further instructions from your care team       Discharge Instructions for Undelivered Patients    Diet:  * Drink 8 to 12 glasses of liquids (milk, juice, water) every day  * You may eat meals and snacks.    Activity:  * Count fetal kicks every day.  * Call your doctor if your baby is moving less than usual.    Call your provider if you notice:  * Swelling in your face or increased swelling in your hands or legs.  * Headaches that are not relieved by Tylenol (acetaminophen).  * Changes in your vision (blurring; seeing spots or stars).  * Nausea (sick to your stomach) and vomiting  "(throwing up).  * Weight gain of 5 pounds per week.  * Heartburn that doesn't go away.  * Signs of bladder infection: Pain when you urinate (use the toilet), needing to go more often or more urgently.  * The bag of muniz (membrane) breaks, or you notice leaking in your underwear.  * Bright red blood in your underwear.  * Abdominal (lower belly) or stomach pain.  * For first baby: Contractions (tightenings) less than 5 minutes apart for one hour or more.  * Second (plus) baby: Contractions (tightenings) less than 10 minutes apart and getting stronger.  * Increase or change in vaginal discharge (note the color and amount).    ***    [facility]: [phone]        Pending Results     No orders found from 2018 to 2018.            Admission Information     Date & Time Provider Department Dept. Phone    2018 Rajan Duran MD [facility] [phone]      Your Vitals Were     Blood Pressure Pulse Temperature Respirations Height Weight    97/54 97 98.5  F (36.9  C) (Oral) 16 1.575 m (5' 2\") 54.4 kg (120 lb)    Last Period Pulse Oximetry BMI (Body Mass Index)             (LMP Unknown) 97% 21.95 kg/m2         MyChart Information     [employer] lets you send messages to your doctor, view your test results, renew your prescriptions, schedule appointments and more. To sign up, go to [email or web address] . Click on \"Log in\" on the left side of the screen, which will take you to the Welcome page. Then click on \"Sign up Now\" on the right side of the page.     You will be asked to enter the access code listed below, as well as some personal information. Please follow the directions to create your username and password.     Your access code is: [email or web address]  Expires: 3/4/2019 10:43 PM     Your access code will  in 90 days. If you need help or a new code, please call your [facility] or [phone].        Care EveryWhere ID     This is your Care EveryWhere ID. This " could be used by other organizations to access your [place] medical records  [id number]        Equal Access to Services     FÁTIMA MADDEN : Hadii aad ku hadshelbycatherine Rubio, wapaulieda lukamilahadaha, qaybta kaalmada josereynaldo, waxay idiin hayclaudiajb garciaameya leorajiveddie crockett. So [facility] [phone].    ATENCIÓN: Si habla español, tiene a martinez disposición servicios gratuitos de asistencia lingüística. Llame al [phone].    We comply with applicable federal civil rights laws and [place] laws. We do not discriminate on the basis of race, color, national origin, age, disability, sex, sexual orientation, or gender identity.               Review of your medicines      UNREVIEWED medicines. Ask your doctor about these medicines        Dose / Directions    clindamycin 300 MG capsule   Commonly known as:  CLEOCIN        Dose:  300 mg   Take 300 mg by mouth every 6 hours   Refills:  0       loratadine 10 MG tablet   Commonly known as:  CLARITIN   Used for:  Allergic state, initial encounter        Dose:  10 mg   Take 1 tablet (10 mg) by mouth daily   Quantity:  90 tablet   Refills:  3       omeprazole 10 MG DR capsule   Commonly known as:  priLOSEC   Used for:  Gastroesophageal reflux disease, esophagitis presence not specified        Dose:  20 mg   Take 2 capsules (20 mg) by mouth daily   Quantity:  60 capsule   Refills:  3       prenatal multivitamin w/iron 27-0.8 MG tablet   Used for:  Normal pregnancy in first trimester        Dose:  1 tablet   Take 1 tablet by mouth daily   Quantity:  100 tablet   Refills:  3       TYLENOL PO        Dose:  1000 mg   Take 1,000 mg by mouth   Refills:  0                Protect others around you: Learn how to safely use, store and throw away your medicines at www.disposemymeds.org.             Medication List: This is a list of all your medications and when to take them. Check marks below indicate your daily home schedule. Keep this list as a reference.      Medications           Morning Afternoon Evening  Bedtime As Needed    clindamycin 300 MG capsule   Commonly known as:  CLEOCIN   Take 300 mg by mouth every 6 hours                                loratadine 10 MG tablet   Commonly known as:  CLARITIN   Take 1 tablet (10 mg) by mouth daily                                omeprazole 10 MG DR capsule   Commonly known as:  priLOSEC   Take 2 capsules (20 mg) by mouth daily                                prenatal multivitamin w/iron 27-0.8 MG tablet   Take 1 tablet by mouth daily                                TYLENOL PO   Take 1,000 mg by mouth

## 2018-12-04 NOTE — IP AVS SNAPSHOT
[facility]    [address] [address]    Phone:  [phone]    Fax:  [phone]                                       After Visit Summary   12/4/2018    Gerald Evans    MRN: [id number]           After Visit Summary Signature Page     I have received my discharge instructions, and my questions have been answered. I have discussed any challenges I see with this plan with the nurse or doctor.    ..........................................................................................................................................  Patient/Patient Representative Signature      ..........................................................................................................................................  Patient Representative Print Name and Relationship to Patient    ..................................................               ................................................  Date                                   Time    ..........................................................................................................................................  Reviewed by Signature/Title    ...................................................              ..............................................  Date                                               Time          22EPIC Rev 08/18

## 2018-12-05 NOTE — TELEPHONE ENCOUNTER
"Obstetric Phone Triage- [facility]    **REMEMBER: Review patient's prenatal record including complications with pregnancy and medications patient may be on (insulins, tocolytic, etc.)**      2018 7:50 PM 18  Gerald Evans 1995   Home Phone [phone]   Mobile [phone]                       Last office visit/Cervix exam:    EDC 19 Gestational Age 24 weeks    Spoke with client  Patient lives [place] miles away    Reason for call per pt:contractions    Is your provider aware of this concern? no    Did you have any complications with this or a previous pregnancy? (Pre-term labor, C/S, Previa, pre-eclampsia, diabetes) pre term, , loss    Are you having contractions? Yes   -If yes: Contractions irregular     When did they start? On and off for a few days    Have you palpated your contractions? Yes    Describe contraction discomfort (ex: having to breath through contractions, stop what you are doing, etc.): lower abd cramping \"bad period cramps\"    Vaginal/rectal pressure: on and off     What types of activity have you done in the past 24 hours? Normal     Have you had intercourse/anything in the vagina in the last 48 hours?   No    Are you being treated for any vaginal infections? no    How much fluids have you been drinking? Yes     Are you having any bloody show/Bleeding? No   -If yes: Amount:     Color:     [place]/anything vaginally in the last 24-48 hours?     When did it start?     Pain since bleeding started?     Are you having any new vaginal discharge or are you leaking fluids/has your water broken? yes   -If yes: Amount: mod amount of vaginal discharge ( increase from before)    Color: clear/milky white    Odor: cant tell     Time: past few days     Are you being treated for any vaginal infection? no    Is your baby moving normally? No   -If no: Have you monitored your baby's movements? yes    When did you notice this decrease in baby movement? Yes decrease "       Additional Concerns (abd pain, headache, swelling, visual changes, etc):     Patient Advised: to come in and be assessed     Call taken by:  Maya MURCIA RN

## 2018-12-05 NOTE — PLAN OF CARE
revied labs results with alyson Ambriz to discharge home. Educate on proper fluid intake, rest, and to follow up with primary OB/GYN

## 2018-12-05 NOTE — PLAN OF CARE
called, reviewed lab results, new orders placed with read back for FFN, Amnisure, et wet prep. Update MD with results

## 2018-12-05 NOTE — PLAN OF CARE
Gerald Evans is a 23 year old  and 24w5d patient came in complaining of Contractions    Patient Active Problem List   Diagnosis     Hidradenitis suppurativa     Smoker     Blood type, Rh negative     Loose stools     Grief reaction     Anxiety and depression     Mild intermittent asthma     Need for Tdap vaccination     Need for immunization against influenza     NO SHOW     Encounter for triage in pregnant patient     Heartburn     Subchorionic hemorrhage in first trimester     Short interval between pregnancies affecting pregnancy in first trimester, antepartum     Vaginal bleeding     Previous  delivery, antepartum condition or complication     Previous  delivery, antepartum     Dyspareunia in female     History of ectopic pregnancy     History of      History of alcoholism (H)     Dysmenorrhea     Papanicolaou smear of cervix with low grade squamous intraepithelial lesion (LGSIL)     Allergic rhinitis due to other allergen     Anemia     Attention deficit hyperactivity disorder (ADHD)     Constipation     Esophageal reflux     Exercise-induced asthma     Migraine with aura     Oppositional defiant disorder     Panic disorder without agoraphobia     Encounter for supervision of other normal pregnancy, first trimester     Encounter for sterilization     Moulton's duct cyst     Pregnancy related bilateral lower abdominal cramping, antepartum--2018     Urinary tract infection in mother during first trimester of pregnancy--Macrobid x 7--2018       Pt discharged to home at 10:41 PM and encouraged to rest and drink plenty of fluids.  Pt told to call/return if bleeding more than spotting, water breaks, contractions 3-5 minutes apart that she has to breath through.     Nursing education on fluid intake  provided. Self-management instructions reviewed. New none medications and none therapies reviewed. AVS given and signed. All questions answered and patient verbalizes  "understanding.    BP 97/54  Pulse 97  Temp 98.5  F (36.9  C) (Oral)  Resp 16  Ht 1.575 m (5' 2\")  Wt 54.4 kg (120 lb)  LMP  (LMP Unknown)  SpO2 97%  BMI 21.95 kg/m2    Cervical status: not examined  Fetal Assessment: Reactive    Discharge support:  Family/Friend Support    Obstetric History       T1      L3     SAB0   TAB0   Ectopic1   Multiple1   Live Births3       # Outcome Date GA Lbr Ward/2nd Weight Sex Delivery Anes PTL Lv   5 Current            4 AB 2016 10w0d    TAB      3A  16 31w4d    -SEC   ELLY   3B       -SEC   ELLY   2 Ectopic 2014     ECTOPIC      1 Term 12 39w0d  2.863 kg (6 lb 5 oz) F Vag-Spont   ELLY      Name: Gisele Shea    "

## 2018-12-05 NOTE — DISCHARGE INSTRUCTIONS

## 2018-12-05 NOTE — PLAN OF CARE
OB Triage Note  Gerald Evans  MRN: [id number]  Gestational Age: 24w5d      Gerald Evans presents for r/o labor (sign/symptom/concern).      States prieto since a few days ago on and off .  Rates pain at 8/10.  Bleeding: none    Denies LOF.       notified of arrival and condition.  Oriented patient to surroundings. Call light within reach.     FHT: 140  NST Start Time:  NST Stop Time:  NST: Reactive .  Uterine Assessment:Contractions: none picking up on monitor, client reporting cramping q5 min     Plan:  -Initial NST, then fetal/uterine monitoring per MD/patient plan.  -Sterile vaginal exam/sterile speculum exam.  -Nursing education on labor provided.

## 2018-12-06 ENCOUNTER — PRENATAL OFFICE VISIT (OUTPATIENT)
Dept: OBGYN | Facility: OTHER | Age: 23
End: 2018-12-06
Attending: OBSTETRICS & GYNECOLOGY
Payer: COMMERCIAL

## 2018-12-06 VITALS
BODY MASS INDEX: 22.04 KG/M2 | SYSTOLIC BLOOD PRESSURE: 88 MMHG | OXYGEN SATURATION: 99 % | HEART RATE: 82 BPM | WEIGHT: 120.5 LBS | DIASTOLIC BLOOD PRESSURE: 48 MMHG

## 2018-12-06 DIAGNOSIS — O09.892 SUPERVISION OF OTHER HIGH RISK PREGNANCIES, SECOND TRIMESTER: Primary | ICD-10-CM

## 2018-12-06 DIAGNOSIS — O26.842 UTERINE SIZE DATE DISCREPANCY PREGNANCY, SECOND TRIMESTER: ICD-10-CM

## 2018-12-06 PROCEDURE — 99207 ZZC PRENATAL VISIT: CPT | Performed by: OBSTETRICS & GYNECOLOGY

## 2018-12-06 PROCEDURE — G0463 HOSPITAL OUTPT CLINIC VISIT: HCPCS | Performed by: OBSTETRICS & GYNECOLOGY

## 2018-12-06 ASSESSMENT — PAIN SCALES - GENERAL: PAINLEVEL: NO PAIN (0)

## 2018-12-06 NOTE — PROGRESS NOTES
Doing well.  No concerns today.  Denies PTL sx, vb, lof  Reminded of upcoming labs including 1 hour GTT  Reviewed recent US-no concerns with anatomical survey.  F/u US for growth ordered.   Return to clinic in 43weeks    Caleb Kulkarni MD  12/6/2018

## 2018-12-06 NOTE — MR AVS SNAPSHOT
After Visit Summary   12/6/2018    Gerald Evans    MRN: [id number]           Patient Information     Date Of Birth          1995        Visit Information        Provider Department      12/6/2018 2:30 PM Caleb Kulkarni MD [facility]        Today's Diagnoses     Supervision of other high risk pregnancies, second trimester    -  1    Uterine size date discrepancy pregnancy, second trimester          Care Instructions    F/u 3 wks          Follow-ups after your visit        Your next 10 appointments already scheduled     Jan 03, 2019  3:45 PM CST   (Arrive by 3:30 PM)   ESTABLISHED PRENATAL with Caleb Kulkarni MD   [facility] ([facility] )    [address] [facility] [address]   [phone]            Jan 08, 2019 11:00 AM CST   US OB TELEMEDICINE LEVEL II COMPREHENSIVE SINGLE with HIUS2   [facility] ULTRASOUND ([facility] )    [address] [place] [address]   [phone]           Please bring a list of your medicines (including vitamins, minerals and over-the-counter drugs). Also, tell your doctor about any allergies you may have. Wear comfortable clothes and leave your valuables at home.  If you're less than 20 weeks drink four 8-ounce glasses of fluid an hour before your exam. If you need to empty your bladder before your exam, try to release only a little urine. Then, drink another glass of fluid.  We do not allow the use of cameras or video during the exam.  The sonographer will be happy to provide take home pictures.  You may have up to two adult family members in the exam room.  Although we encourage family participation, we ask that you consider not bringing young children to these appointments.  Background noise may interfere with the telemedicine connection.  Please call the Imaging Department at your exam site with any questions.            Jan 08, 2019 11:00 AM CST   MFM TELEMEDICINE US COMPREHENSIVE  [facility] [place] ([facility])    [address]  [facility] [address]   [phone]            Jan 08, 2019 11:30 AM CST   Radiology MD with JAGDEEP THOMPSON MD   [facility] Maternal Fetal Medicine - [place] ([facility])    [address]  [facility] [address]   [phone]           Please arrive at the time given for your first appointment. This visit is used internally to schedule the physician's time during your ultrasound.            May 08, 2019  2:45 PM CDT   (Arrive by 2:30 PM)   Return Visit with Edmar Sánchez MD   [facility] )    [address]  [facility] [address]   [phone]              Future tests that were ordered for you today     Open Future Orders        Priority Expected Expires Ordered    CBC with platelets Routine 12/27/2018 1/31/2019 12/6/2018    Glucose tolerance gest screen 1 hour Routine 12/27/2018 1/31/2019 12/6/2018    US OB >14 Weeks Follow Up Routine 12/6/2018 1/4/2019 12/6/2018    Antibody screen red cell STAT 12/27/2018 1/25/2019 12/6/2018            Who to contact     If you have questions or need follow up information about today's clinic visit or your schedule please contact [facility] directly at [phone].  Normal or non-critical lab and imaging results will be communicated to you by MyChart, letter or phone within 4 business days after the clinic has received the results. If you do not hear from us within 7 days, please contact the clinic through MyChart or phone. If you have a critical or abnormal lab result, we will notify you by phone as soon as possible.  Submit refill requests through [employer] or call your pharmacy and they will forward the refill request to us. Please allow 3 business days for your refill to be completed.          Additional  "Information About Your Visit        MyChart Information     [employer] lets you send messages to your doctor, view your test results, renew your prescriptions, schedule appointments and more. To sign up, go to [email or web address] . Click on \"Log in\" on the left side of the screen, which will take you to the Welcome page. Then click on \"Sign up Now\" on the right side of the page.     You will be asked to enter the access code listed below, as well as some personal information. Please follow the directions to create your username and password.     Your access code is: [email or web address]  Expires: 3/4/2019 10:43 PM     Your access code will  in 90 days. If you need help or a new code, please call your [place] clinic or [phone].        Care EveryWhere ID     This is your Care EveryWhere ID. This could be used by other organizations to access your [place] medical records  [id number]        Your Vitals Were     Pulse Last Period Pulse Oximetry BMI (Body Mass Index)          82 (LMP Unknown) 99% 22.04 kg/m2         Blood Pressure from Last 3 Encounters:   18 (!) 88/48   18 97/54   18 101/60    Weight from Last 3 Encounters:   18 120 lb 8 oz (54.7 kg)   18 120 lb (54.4 kg)   18 120 lb (54.4 kg)               Primary Care Provider Office Phone # Fax #    Jennie Carter, ROBERT [phone]       [facility] MEDICINE [address]  [facility] [address]        Equal Access to Services     [facility] CenterBRITTNEY : Hadii che Rubio, wajorge l nur, qaybta nicole marie. So [facility] [phone].    ATENCIÓN: Si habla español, tiene a martinez disposición servicios gratuitos de asistencia lingüística. Aniya al [phone].    We comply with applicable federal civil rights laws and [place] laws. We do not discriminate on the basis of race, color, national origin, age, disability, sex, sexual orientation, or gender " identity.            Thank you!     Thank you for choosing [facility]  for your care. Our goal is always to provide you with excellent care. Hearing back from our patients is one way we can continue to improve our services. Please take a few minutes to complete the written survey that you may receive in the mail after your visit with us. Thank you!             Your Updated Medication List - Protect others around you: Learn how to safely use, store and throw away your medicines at www.disposemymeds.org.          This list is accurate as of 12/6/18  4:50 PM.  Always use your most recent med list.                   Brand Name Dispense Instructions for use Diagnosis    loratadine 10 MG tablet    CLARITIN    90 tablet    Take 1 tablet (10 mg) by mouth daily    Allergic state, initial encounter       omeprazole 10 MG DR capsule    priLOSEC    60 capsule    Take 2 capsules (20 mg) by mouth daily    Gastroesophageal reflux disease, esophagitis presence not specified       prenatal multivitamin w/iron 27-0.8 MG tablet     100 tablet    Take 1 tablet by mouth daily    Normal pregnancy in first trimester       TYLENOL PO      Take 1,000 mg by mouth

## 2018-12-06 NOTE — NURSING NOTE
"Chief Complaint   Patient presents with     Prenatal Care     25weeks       Initial BP (!) 88/48 (BP Location: Left arm, Patient Position: Sitting, Cuff Size: Adult Regular)  Pulse 82  Wt 120 lb 8 oz (54.7 kg)  LMP  (LMP Unknown)  SpO2 99%  BMI 22.04 kg/m2 Estimated body mass index is 22.04 kg/(m^2) as calculated from the following:    Height as of 12/4/18: 5' 2\" (1.575 m).    Weight as of this encounter: 120 lb 8 oz (54.7 kg).  Medication Reconciliation: complete    ROOSEVELT PETERSON LPN    "

## 2018-12-17 ENCOUNTER — TELEPHONE (OUTPATIENT)
Dept: OBGYN | Facility: HOSPITAL | Age: 23
End: 2018-12-17

## 2018-12-18 NOTE — TELEPHONE ENCOUNTER
Obstetric Phone Triage- [place]    **REMEMBER: Review patient's prenatal record including complications with pregnancy and medications patient may be on (insulins, tocolytic, etc.)**      2018 7:06 PM  Gerald Bonelinh 1995   Home Phone [phone]   Mobile [phone]                       Last office visit/Cervix exam: Shad No Cervical Exam  G 5 P1 EDC 3/21/2018 Gestational Age 26 4/7 weeks    Spoke with Patient  Patient lives 1 miles away    Reason for call per pt: Last night patient started having contractions at 1600 and felt like a boulder was going to come out of her vagina.  Now currently having back spasms, drank cold water, laid down.  All day today is having constant diarrhea with mild cramping.      Is your provider aware of this concern? no    Did you have any complications with this or a previous pregnancy? Pt stated  labor with previous pregnancies    Are you having contractions? Yes   -If yes: Contractions irregular able to breath through contractions          Vaginal/rectal pressure: yes    What types of activity have you done in the past 24 hours?     Have you had intercourse/anything in the vagina in the last 48 hours?   Yes right before contractions started.    Are you being treated for any vaginal infections? no    How much fluids have you been drinking? 8-10 glasses of H2O per day.      Are you having any bloody show/Bleeding? No          Pain since bleeding started? no    Are you having any new vaginal discharge or are you leaking fluids/has your water broken? no    Are you being treated for any vaginal infection? no    Is your baby moving normally? Yes         Additional Concerns (abd pain, headache, swelling, visual changes, etc): No other concerns    Patient Advised: You are always more than welcome to come in and be evaluated. If you decide to stay home, Pt wants to come in and be evaluated.    Call taken by: Gracy NEAL    Call back time:          Information:

## 2018-12-19 ENCOUNTER — TELEPHONE (OUTPATIENT)
Dept: OBGYN | Facility: OTHER | Age: 23
End: 2018-12-19

## 2018-12-20 NOTE — TELEPHONE ENCOUNTER
Pt called saying she has been having some contractions and vaginal pressure for the last hour. Says she has been on her feet all day and is now driving in the car. Should be home in about 15 minutes. Baby moving normally. Is going to lay on her left side and drink 6-8 glasses of water. Will call back in 30-60 minutes and give update.

## 2018-12-27 ENCOUNTER — HOSPITAL ENCOUNTER (OUTPATIENT)
Dept: ULTRASOUND IMAGING | Facility: HOSPITAL | Age: 23
Discharge: HOME | End: 2018-12-27
Attending: OBSTETRICS & GYNECOLOGY | Admitting: OBSTETRICS & GYNECOLOGY
Payer: COMMERCIAL

## 2018-12-27 DIAGNOSIS — O26.842 UTERINE SIZE DATE DISCREPANCY PREGNANCY, SECOND TRIMESTER: ICD-10-CM

## 2018-12-27 DIAGNOSIS — O09.892 SUPERVISION OF OTHER HIGH RISK PREGNANCIES, SECOND TRIMESTER: ICD-10-CM

## 2018-12-27 PROCEDURE — 76816 OB US FOLLOW-UP PER FETUS: CPT | Mod: TC

## 2018-12-28 ENCOUNTER — HOSPITAL ENCOUNTER (OUTPATIENT)
Facility: HOSPITAL | Age: 23
Discharge: HOME | End: 2018-12-28
Attending: OBSTETRICS & GYNECOLOGY | Admitting: OBSTETRICS & GYNECOLOGY
Payer: COMMERCIAL

## 2018-12-28 VITALS
SYSTOLIC BLOOD PRESSURE: 96 MMHG | RESPIRATION RATE: 16 BRPM | HEART RATE: 97 BPM | BODY MASS INDEX: 23 KG/M2 | HEIGHT: 62 IN | WEIGHT: 125 LBS | OXYGEN SATURATION: 99 % | DIASTOLIC BLOOD PRESSURE: 59 MMHG | TEMPERATURE: 98.2 F

## 2018-12-28 PROCEDURE — G0463 HOSPITAL OUTPT CLINIC VISIT: HCPCS | Mod: 25

## 2018-12-28 PROCEDURE — 59025 FETAL NON-STRESS TEST: CPT

## 2018-12-28 ASSESSMENT — MIFFLIN-ST. JEOR: SCORE: 1265.25

## 2018-12-29 NOTE — PLAN OF CARE
"OB Triage Note  Gerald Evans  MRN: [id number]  Gestational Age: 28w1d      Gerald Evans presents for abd cramping.      States cramping on and off since yesterday [id number] with one time of quarter sized red blood noted when she wiped. No bleeding since and cramping continued until 173. Pt is here to \"just be evaluated\"    Dr. Bethea notified of arrival and condition.  Oriented patient to surroundings. Call light within reach.     FHT: 150  NST Start Time:  NST Stop Time:  NST: Reactive .  Uterine Assessment:not prieto or cramping at this time   Plan:  -Initial NST, then fetal/uterine monitoring per MD/patient plan.  -Sterile vaginal exam/sterile speculum exam.      "

## 2018-12-29 NOTE — DISCHARGE INSTRUCTIONS

## 2019-01-03 ENCOUNTER — PRENATAL OFFICE VISIT (OUTPATIENT)
Dept: OBGYN | Facility: OTHER | Age: 24
End: 2019-01-03
Attending: OBSTETRICS & GYNECOLOGY
Payer: COMMERCIAL

## 2019-01-03 ENCOUNTER — APPOINTMENT (OUTPATIENT)
Dept: LAB | Facility: OTHER | Age: 24
End: 2019-01-03
Attending: OBSTETRICS & GYNECOLOGY
Payer: COMMERCIAL

## 2019-01-03 VITALS
HEART RATE: 99 BPM | BODY MASS INDEX: 23 KG/M2 | SYSTOLIC BLOOD PRESSURE: 109 MMHG | WEIGHT: 125 LBS | HEIGHT: 62 IN | DIASTOLIC BLOOD PRESSURE: 60 MMHG | OXYGEN SATURATION: 99 %

## 2019-01-03 DIAGNOSIS — N89.8 VAGINAL DISCHARGE: Primary | ICD-10-CM

## 2019-01-03 DIAGNOSIS — O09.892 SUPERVISION OF OTHER HIGH RISK PREGNANCIES, SECOND TRIMESTER: ICD-10-CM

## 2019-01-03 DIAGNOSIS — R82.90 ABNORMAL URINE FINDINGS: ICD-10-CM

## 2019-01-03 DIAGNOSIS — O34.219 PREVIOUS CESAREAN DELIVERY, ANTEPARTUM CONDITION OR COMPLICATION: ICD-10-CM

## 2019-01-03 DIAGNOSIS — R10.2 PELVIC PAIN IN FEMALE: ICD-10-CM

## 2019-01-03 DIAGNOSIS — R35.0 URINARY FREQUENCY: ICD-10-CM

## 2019-01-03 LAB
ALBUMIN UR-MCNC: NEGATIVE MG/DL
APPEARANCE UR: CLEAR
BACTERIA #/AREA URNS HPF: ABNORMAL /HPF
BILIRUB UR QL STRIP: NEGATIVE
COLOR UR AUTO: YELLOW
ERYTHROCYTE [DISTWIDTH] IN BLOOD BY AUTOMATED COUNT: 12.8 % (ref 10–15)
FIBRONECTIN FETAL VAG QL: NEGATIVE
GLUCOSE 1H P 50 G GLC PO SERPL-MCNC: 114 MG/DL (ref 60–129)
GLUCOSE UR STRIP-MCNC: NEGATIVE MG/DL
HCT VFR BLD AUTO: 31.5 % (ref 35–47)
HGB BLD-MCNC: 10.7 G/DL (ref 11.7–15.7)
HGB UR QL STRIP: NEGATIVE
KETONES UR STRIP-MCNC: NEGATIVE MG/DL
LEUKOCYTE ESTERASE UR QL STRIP: ABNORMAL
MCH RBC QN AUTO: 30.1 PG (ref 26.5–33)
MCHC RBC AUTO-ENTMCNC: 34 G/DL (ref 31.5–36.5)
MCV RBC AUTO: 89 FL (ref 78–100)
MUCOUS THREADS #/AREA URNS LPF: PRESENT /LPF
NITRATE UR QL: NEGATIVE
PH UR STRIP: 7 PH (ref 4.7–8)
PLATELET # BLD AUTO: 282 10E9/L (ref 150–450)
RBC # BLD AUTO: 3.56 10E12/L (ref 3.8–5.2)
RBC #/AREA URNS AUTO: <1 /HPF (ref 0–2)
SOURCE: ABNORMAL
SP GR UR STRIP: 1.01 (ref 1–1.03)
SPECIMEN SOURCE: NORMAL
SQUAMOUS #/AREA URNS AUTO: 4 /HPF (ref 0–1)
UROBILINOGEN UR STRIP-MCNC: NORMAL MG/DL (ref 0–2)
WBC # BLD AUTO: 11.6 10E9/L (ref 4–11)
WBC #/AREA URNS AUTO: 2 /HPF (ref 0–5)
WET PREP SPEC: NORMAL

## 2019-01-03 PROCEDURE — 36415 COLL VENOUS BLD VENIPUNCTURE: CPT | Mod: ZL | Performed by: OBSTETRICS & GYNECOLOGY

## 2019-01-03 PROCEDURE — 86850 RBC ANTIBODY SCREEN: CPT | Mod: ZL | Performed by: OBSTETRICS & GYNECOLOGY

## 2019-01-03 PROCEDURE — 99207 ZZC PRENATAL VISIT: CPT | Performed by: OBSTETRICS & GYNECOLOGY

## 2019-01-03 PROCEDURE — G0463 HOSPITAL OUTPT CLINIC VISIT: HCPCS

## 2019-01-03 PROCEDURE — 85027 COMPLETE CBC AUTOMATED: CPT | Mod: ZL | Performed by: OBSTETRICS & GYNECOLOGY

## 2019-01-03 PROCEDURE — 82731 ASSAY OF FETAL FIBRONECTIN: CPT | Mod: ZL | Performed by: OBSTETRICS & GYNECOLOGY

## 2019-01-03 PROCEDURE — G0463 HOSPITAL OUTPT CLINIC VISIT: HCPCS | Mod: 25

## 2019-01-03 PROCEDURE — 87210 SMEAR WET MOUNT SALINE/INK: CPT | Mod: ZL | Performed by: OBSTETRICS & GYNECOLOGY

## 2019-01-03 PROCEDURE — 87086 URINE CULTURE/COLONY COUNT: CPT | Mod: ZL | Performed by: OBSTETRICS & GYNECOLOGY

## 2019-01-03 PROCEDURE — 82950 GLUCOSE TEST: CPT | Mod: ZL | Performed by: OBSTETRICS & GYNECOLOGY

## 2019-01-03 PROCEDURE — 81001 URINALYSIS AUTO W/SCOPE: CPT | Mod: ZL | Performed by: OBSTETRICS & GYNECOLOGY

## 2019-01-03 ASSESSMENT — PAIN SCALES - GENERAL: PAINLEVEL: NO PAIN (0)

## 2019-01-03 ASSESSMENT — MIFFLIN-ST. JEOR: SCORE: 1265.25

## 2019-01-03 NOTE — NURSING NOTE
"Chief Complaint   Patient presents with     Prenatal Care     29 weeks       Initial /60 (BP Location: Left arm, Cuff Size: Adult Regular)   Pulse 99   Ht 1.575 m (5' 2\")   Wt 56.7 kg (125 lb)   LMP  (LMP Unknown)   SpO2 99%   BMI 22.86 kg/m   Estimated body mass index is 22.86 kg/m  as calculated from the following:    Height as of this encounter: 1.575 m (5' 2\").    Weight as of this encounter: 56.7 kg (125 lb).  Medication Reconciliation: complete     28 Week Visit    Patient education provided on the following:  Effective positioning and latch techniques  Importance of early initiation of breastfeeding  Importance of rooming-in on a 24-hour basis    We reviewed the [place] [employer] Prenatal Toolkit in the [facility] Resource Book.  Patient questions and concerns addressed and reviewed. Support and encouragement provided.          Margartia Candelario LPN  "

## 2019-01-03 NOTE — PROGRESS NOTES
Doing well.  C/o cramping last weekend and when baby moves and one episode of spotting last week.  Increased mucous discontinue and was told 1cm dilated on L and D.  Wet prep, UA, RHIG, Fetal Fibronectin done.  Cervix multiparous, 1.5 cm external OS but internal OS closed   1 hour GTT done today along with other necessary labs.  Declines TDAP.    US growth next week  Prenatal flowsheet information is reviewed.  Discussed kick counts and fetal movement.  PTL precautions  RTC in 2 weeks    Caleb Kulkarni MD  1/3/2019

## 2019-01-04 LAB — BLD GP AB SCN SERPL QL: NORMAL

## 2019-01-05 LAB
BACTERIA SPEC CULT: NO GROWTH
SPECIMEN SOURCE: NORMAL

## 2019-01-08 ENCOUNTER — TELEPHONE (OUTPATIENT)
Dept: OBGYN | Facility: OTHER | Age: 24
End: 2019-01-08

## 2019-01-08 ENCOUNTER — OFFICE VISIT (OUTPATIENT)
Dept: MATERNAL FETAL MEDICINE | Facility: CLINIC | Age: 24
End: 2019-01-08
Attending: OBSTETRICS & GYNECOLOGY
Payer: COMMERCIAL

## 2019-01-08 ENCOUNTER — HOSPITAL ENCOUNTER (OUTPATIENT)
Dept: ULTRASOUND IMAGING | Facility: CLINIC | Age: 24
End: 2019-01-08
Attending: OBSTETRICS & GYNECOLOGY
Payer: COMMERCIAL

## 2019-01-08 ENCOUNTER — HOSPITAL ENCOUNTER (OUTPATIENT)
Dept: ULTRASOUND IMAGING | Facility: HOSPITAL | Age: 24
Discharge: HOME | End: 2019-01-08
Attending: OBSTETRICS & GYNECOLOGY | Admitting: OBSTETRICS & GYNECOLOGY
Payer: COMMERCIAL

## 2019-01-08 DIAGNOSIS — O34.219 PREVIOUS CESAREAN DELIVERY, ANTEPARTUM CONDITION OR COMPLICATION: ICD-10-CM

## 2019-01-08 DIAGNOSIS — O43.102 PLACENTAL ABNORMALITY IN SECOND TRIMESTER: Primary | ICD-10-CM

## 2019-01-08 DIAGNOSIS — Z98.891 PREVIOUS CESAREAN SECTION: ICD-10-CM

## 2019-01-08 PROCEDURE — 76811 OB US DETAILED SNGL FETUS: CPT | Mod: TC

## 2019-01-08 NOTE — TELEPHONE ENCOUNTER
Pt would like to know if she can have a referral with Dr Kelsea Rodriguez at [facility] to discuss possible . Please advise

## 2019-01-08 NOTE — PROGRESS NOTES
Type of service:    Telemedicine Office Visit for an ultrasound    Date of service:     Date: 01/08/19     Time service began:    11:00 AM  Time service ended:    12:00 PM    Reason:      .tel: Lack of available service in patient area    Description of basis or telemedicine appropriateness:     Consultation provided at the request of Dr. Kulkarni for advice regarding the diagnosis and treatment of this patient's pregnancy with placental lakes.  The patient's condition can be safely assessed via telemedicine.    The Mode of Transmission:    Secure interactive audio and visual telecommunication system (Video Guidance)    Location of originating and distant sites:      Originating site:   [place], [place]    Distant site:    [place], [place]    Candelario Brink

## 2019-01-13 ENCOUNTER — HOSPITAL ENCOUNTER (OUTPATIENT)
Facility: HOSPITAL | Age: 24
Discharge: HOME | End: 2019-01-13
Attending: OBSTETRICS & GYNECOLOGY | Admitting: OBSTETRICS & GYNECOLOGY
Payer: COMMERCIAL

## 2019-01-13 VITALS
DIASTOLIC BLOOD PRESSURE: 64 MMHG | OXYGEN SATURATION: 98 % | RESPIRATION RATE: 18 BRPM | TEMPERATURE: 98.1 F | SYSTOLIC BLOOD PRESSURE: 101 MMHG | HEART RATE: 98 BPM

## 2019-01-13 LAB
ALBUMIN UR-MCNC: 10 MG/DL
APPEARANCE UR: CLEAR
BACTERIA #/AREA URNS HPF: ABNORMAL /HPF
BILIRUB UR QL STRIP: NEGATIVE
COLOR UR AUTO: YELLOW
FIBRONECTIN FETAL VAG QL: NEGATIVE
GLUCOSE UR STRIP-MCNC: NEGATIVE MG/DL
HGB UR QL STRIP: NEGATIVE
KETONES UR STRIP-MCNC: NEGATIVE MG/DL
LEUKOCYTE ESTERASE UR QL STRIP: ABNORMAL
MUCOUS THREADS #/AREA URNS LPF: PRESENT /LPF
NITRATE UR QL: NEGATIVE
PH UR STRIP: 6.5 PH (ref 4.7–8)
RBC #/AREA URNS AUTO: 2 /HPF (ref 0–2)
SOURCE: ABNORMAL
SP GR UR STRIP: 1.01 (ref 1–1.03)
SQUAMOUS #/AREA URNS AUTO: 5 /HPF (ref 0–1)
UROBILINOGEN UR STRIP-MCNC: NORMAL MG/DL (ref 0–2)
WBC #/AREA URNS AUTO: 7 /HPF (ref 0–5)

## 2019-01-13 PROCEDURE — 59025 FETAL NON-STRESS TEST: CPT

## 2019-01-13 PROCEDURE — 82731 ASSAY OF FETAL FIBRONECTIN: CPT | Performed by: OBSTETRICS & GYNECOLOGY

## 2019-01-13 PROCEDURE — 81001 URINALYSIS AUTO W/SCOPE: CPT | Performed by: OBSTETRICS & GYNECOLOGY

## 2019-01-13 PROCEDURE — 87086 URINE CULTURE/COLONY COUNT: CPT | Performed by: OBSTETRICS & GYNECOLOGY

## 2019-01-13 PROCEDURE — G0463 HOSPITAL OUTPT CLINIC VISIT: HCPCS | Mod: 25

## 2019-01-13 PROCEDURE — G0463 HOSPITAL OUTPT CLINIC VISIT: HCPCS

## 2019-01-13 PROCEDURE — 59025 FETAL NON-STRESS TEST: CPT | Mod: 26 | Performed by: OBSTETRICS & GYNECOLOGY

## 2019-01-14 NOTE — PLAN OF CARE
OB Triage Note  Gerald Evans  MRN: [id number]  Gestational Age: 30w3d      Gerald Evans presents for contractions (sign/symptom/concern).      States prieto since 2019  Rates pain at 2/10.  Bleeding: None Denies LOF.      Dr. Duran notified of arrival and condition.  Oriented patient to surroundings. Call light within reach.     FHT: 130  NST Start Time: 2208  NST Stop Time: 2254  NST: Reactive .  Uterine Assessment:Contractions: None    Plan:  -Initial NST, then fetal/uterine monitoring per MD/patient plan.  -Sterile vaginal exam/sterile speculum exam.  -Nursing education on  Labor provided.

## 2019-01-14 NOTE — DISCHARGE INSTRUCTIONS
Discharge Instructions for Undelivered Patients    Diet:  * Drink 8 to 12 glasses of liquids (milk, juice, water) every day  * You may eat meals and snacks.    Activity:  * Count fetal kicks every day.  * Call your doctor if your baby is moving less than usual.    Call your provider if you notice:  * Swelling in your face or increased swelling in your hands or legs.  * Headaches that are not relieved by Tylenol (acetaminophen).  * Changes in your vision (blurring; seeing spots or stars).  * Nausea (sick to your stomach) and vomiting (throwing up).  * Weight gain of 5 pounds per week.  * Heartburn that doesn't go away.  * Signs of bladder infection: Pain when you urinate (use the toilet), needing to go more often or more urgently.  * The bag of muniz (membrane) breaks, or you notice leaking in your underwear.  * Bright red blood in your underwear.  * Abdominal (lower belly) or stomach pain.  * Second (plus) baby: Contractions (tightenings) less than 10 minutes apart and getting stronger.  * Increase or change in vaginal discharge (note the color and amount).      [facility]: [phone]

## 2019-01-15 LAB
BACTERIA SPEC CULT: NORMAL
SPECIMEN SOURCE: NORMAL

## 2019-01-16 NOTE — PROGRESS NOTES
Fetal Non-Stress Test Results    NST Ordered By: Dr. Kulkarni  NST Medical Indication: Contractions    NST Start & Stop Times  NST Start Time: 2208  NST Stop Time: 2254                            NST Results  Fetus A   Baseline Rate: Normal  Accelerations: Present  Decelerations: None  Interpretation: reactive

## 2019-01-17 ENCOUNTER — TELEPHONE (OUTPATIENT)
Dept: OBGYN | Facility: OTHER | Age: 24
End: 2019-01-17

## 2019-01-17 NOTE — TELEPHONE ENCOUNTER
Has discharged changed in last two weeks since we did swabs to check for infection?  We could have you come in and recheck for vaginal infection or you could mention at your appt in [place] tomorrow.

## 2019-01-17 NOTE — TELEPHONE ENCOUNTER
Pt is 31 weeks and was seen in [facility] yesterday for cramping and vaginal discharge. Pt states she continues to have period like cramping and yellow vaginal discharge. Denies fever. Baby moving. No signs of UTI. She is being seen at [facility] in [place] tomorrow to discuss . Please advise

## 2019-01-17 NOTE — TELEPHONE ENCOUNTER
Pt states vaginal discharge is a little more than 2 weeks ago. She wants to wait till tomorrow when she sees Dr in [place].

## 2019-01-25 ENCOUNTER — PRENATAL OFFICE VISIT (OUTPATIENT)
Dept: OBGYN | Facility: OTHER | Age: 24
End: 2019-01-25
Attending: OBSTETRICS & GYNECOLOGY
Payer: COMMERCIAL

## 2019-01-25 VITALS
DIASTOLIC BLOOD PRESSURE: 60 MMHG | HEIGHT: 62 IN | BODY MASS INDEX: 24.29 KG/M2 | OXYGEN SATURATION: 98 % | HEART RATE: 101 BPM | WEIGHT: 132 LBS | SYSTOLIC BLOOD PRESSURE: 114 MMHG

## 2019-01-25 DIAGNOSIS — O34.219 PREVIOUS CESAREAN DELIVERY, ANTEPARTUM CONDITION OR COMPLICATION: ICD-10-CM

## 2019-01-25 PROCEDURE — G0463 HOSPITAL OUTPT CLINIC VISIT: HCPCS

## 2019-01-25 PROCEDURE — G0463 HOSPITAL OUTPT CLINIC VISIT: HCPCS | Mod: 25

## 2019-01-25 PROCEDURE — 99207 ZZC PRENATAL VISIT: CPT | Performed by: OBSTETRICS & GYNECOLOGY

## 2019-01-25 ASSESSMENT — MIFFLIN-ST. JEOR: SCORE: 1297

## 2019-01-25 ASSESSMENT — PAIN SCALES - GENERAL: PAINLEVEL: NO PAIN (0)

## 2019-01-25 NOTE — NURSING NOTE
"Chief Complaint   Patient presents with     Prenatal Care     32w 1d       Initial /60 (BP Location: Left arm, Cuff Size: Adult Regular)   Pulse 101   Ht 1.575 m (5' 2\")   Wt 59.9 kg (132 lb)   LMP  (LMP Unknown)   SpO2 98%   BMI 24.14 kg/m   Estimated body mass index is 24.14 kg/m  as calculated from the following:    Height as of this encounter: 1.575 m (5' 2\").    Weight as of this encounter: 59.9 kg (132 lb).  Medication Reconciliation: complete    Margarita Candelario LPN  "

## 2019-01-25 NOTE — PROGRESS NOTES
Doing well.  No concerns today except cramping x 3 days.  Was seen in [place].  Negative eval.  Cvx closed today.    Discussed kick counts and fetal movement.  RTC in 1 weeks scheduled in [place].  Plan transfer of care for CATHERINE.  alok Banks MD  1/25/2019

## 2019-02-01 ENCOUNTER — PRENATAL OFFICE VISIT (OUTPATIENT)
Dept: OBGYN | Facility: OTHER | Age: 24
End: 2019-02-01
Attending: OBSTETRICS & GYNECOLOGY
Payer: COMMERCIAL

## 2019-02-01 VITALS
HEART RATE: 94 BPM | DIASTOLIC BLOOD PRESSURE: 60 MMHG | WEIGHT: 134 LBS | OXYGEN SATURATION: 98 % | SYSTOLIC BLOOD PRESSURE: 102 MMHG | BODY MASS INDEX: 24.66 KG/M2 | HEIGHT: 62 IN

## 2019-02-01 DIAGNOSIS — M54.50 ACUTE MIDLINE LOW BACK PAIN WITHOUT SCIATICA: Primary | ICD-10-CM

## 2019-02-01 LAB
ALBUMIN UR-MCNC: 30 MG/DL
APPEARANCE UR: ABNORMAL
BACTERIA #/AREA URNS HPF: ABNORMAL /HPF
BILIRUB UR QL STRIP: NEGATIVE
COLOR UR AUTO: YELLOW
GLUCOSE UR STRIP-MCNC: NEGATIVE MG/DL
HGB UR QL STRIP: NEGATIVE
KETONES UR STRIP-MCNC: NEGATIVE MG/DL
LEUKOCYTE ESTERASE UR QL STRIP: ABNORMAL
MUCOUS THREADS #/AREA URNS LPF: PRESENT /LPF
NITRATE UR QL: NEGATIVE
PH UR STRIP: 6.5 PH (ref 4.7–8)
RBC #/AREA URNS AUTO: 1 /HPF (ref 0–2)
SOURCE: ABNORMAL
SP GR UR STRIP: 1.02 (ref 1–1.03)
SQUAMOUS #/AREA URNS AUTO: 8 /HPF (ref 0–1)
UROBILINOGEN UR STRIP-MCNC: NORMAL MG/DL (ref 0–2)
WBC #/AREA URNS AUTO: 9 /HPF (ref 0–5)

## 2019-02-01 PROCEDURE — 81001 URINALYSIS AUTO W/SCOPE: CPT | Mod: ZL | Performed by: OBSTETRICS & GYNECOLOGY

## 2019-02-01 PROCEDURE — G0463 HOSPITAL OUTPT CLINIC VISIT: HCPCS

## 2019-02-01 PROCEDURE — 87086 URINE CULTURE/COLONY COUNT: CPT | Mod: ZL | Performed by: OBSTETRICS & GYNECOLOGY

## 2019-02-01 PROCEDURE — 99207 ZZC PRENATAL VISIT: CPT | Performed by: OBSTETRICS & GYNECOLOGY

## 2019-02-01 RX ORDER — CYCLOBENZAPRINE HCL 10 MG
10 TABLET ORAL 3 TIMES DAILY PRN
Qty: 30 TABLET | Refills: 0 | Status: SHIPPED | OUTPATIENT
Start: 2019-02-01 | End: 2019-06-19

## 2019-02-01 ASSESSMENT — PAIN SCALES - GENERAL: PAINLEVEL: SEVERE PAIN (7)

## 2019-02-01 ASSESSMENT — MIFFLIN-ST. JEOR: SCORE: 1306.07

## 2019-02-01 NOTE — NURSING NOTE
"Chief Complaint   Patient presents with     Prenatal Care     33w 1d       Initial /60 (BP Location: Left arm, Cuff Size: Adult Regular)   Pulse 94   Ht 1.575 m (5' 2\")   Wt 60.8 kg (134 lb)   LMP  (LMP Unknown)   SpO2 98%   BMI 24.51 kg/m   Estimated body mass index is 24.51 kg/m  as calculated from the following:    Height as of this encounter: 1.575 m (5' 2\").    Weight as of this encounter: 60.8 kg (134 lb).  Medication Reconciliation: complete    Margarita Candelario LPN  "
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
## 2019-02-03 LAB
BACTERIA SPEC CULT: NO GROWTH
SPECIMEN SOURCE: NORMAL

## 2019-02-15 ENCOUNTER — HOSPITAL ENCOUNTER (OUTPATIENT)
Facility: HOSPITAL | Age: 24
Discharge: HOME | End: 2019-02-15
Attending: OBSTETRICS & GYNECOLOGY | Admitting: OBSTETRICS & GYNECOLOGY
Payer: COMMERCIAL

## 2019-02-15 ENCOUNTER — HOSPITAL ENCOUNTER (OUTPATIENT)
Dept: ULTRASOUND IMAGING | Facility: HOSPITAL | Age: 24
Discharge: HOME | End: 2019-02-15
Attending: OBSTETRICS & GYNECOLOGY | Admitting: OBSTETRICS & GYNECOLOGY
Payer: COMMERCIAL

## 2019-02-15 VITALS
TEMPERATURE: 98.3 F | DIASTOLIC BLOOD PRESSURE: 60 MMHG | SYSTOLIC BLOOD PRESSURE: 109 MMHG | BODY MASS INDEX: 23.74 KG/M2 | OXYGEN SATURATION: 97 % | RESPIRATION RATE: 12 BRPM | WEIGHT: 129 LBS | HEIGHT: 62 IN | HEART RATE: 99 BPM

## 2019-02-15 DIAGNOSIS — O26.843 UTERINE SIZE-DATE DISCREPANCY IN THIRD TRIMESTER: ICD-10-CM

## 2019-02-15 DIAGNOSIS — O99.891 PREGNANCY RELATED BILATERAL LOWER ABDOMINAL CRAMPING, ANTEPARTUM: ICD-10-CM

## 2019-02-15 DIAGNOSIS — O09.93 SUPERVISION OF HIGH RISK PREGNANCY IN THIRD TRIMESTER: ICD-10-CM

## 2019-02-15 DIAGNOSIS — O23.41 URINARY TRACT INFECTION IN MOTHER DURING FIRST TRIMESTER OF PREGNANCY: Primary | ICD-10-CM

## 2019-02-15 DIAGNOSIS — R10.30 PREGNANCY RELATED BILATERAL LOWER ABDOMINAL CRAMPING, ANTEPARTUM: ICD-10-CM

## 2019-02-15 LAB
ALBUMIN UR-MCNC: 10 MG/DL
APPEARANCE UR: ABNORMAL
BACTERIA #/AREA URNS HPF: ABNORMAL /HPF
BILIRUB UR QL STRIP: NEGATIVE
COLOR UR AUTO: YELLOW
GLUCOSE UR STRIP-MCNC: NEGATIVE MG/DL
HGB UR QL STRIP: NEGATIVE
KETONES UR STRIP-MCNC: 5 MG/DL
LEUKOCYTE ESTERASE UR QL STRIP: ABNORMAL
MUCOUS THREADS #/AREA URNS LPF: PRESENT /LPF
NITRATE UR QL: NEGATIVE
PH UR STRIP: 7 PH (ref 4.7–8)
RBC #/AREA URNS AUTO: 0 /HPF (ref 0–2)
RUPTURE OF FETAL MEMBRANES BY ROM PLUS: NEGATIVE
SOURCE: ABNORMAL
SP GR UR STRIP: 1.02 (ref 1–1.03)
SQUAMOUS #/AREA URNS AUTO: 13 /HPF (ref 0–1)
UROBILINOGEN UR STRIP-MCNC: NORMAL MG/DL (ref 0–2)
WBC #/AREA URNS AUTO: 24 /HPF (ref 0–5)

## 2019-02-15 PROCEDURE — 96372 THER/PROPH/DIAG INJ SC/IM: CPT

## 2019-02-15 PROCEDURE — G0463 HOSPITAL OUTPT CLINIC VISIT: HCPCS

## 2019-02-15 PROCEDURE — 96360 HYDRATION IV INFUSION INIT: CPT

## 2019-02-15 PROCEDURE — 81001 URINALYSIS AUTO W/SCOPE: CPT | Performed by: OBSTETRICS & GYNECOLOGY

## 2019-02-15 PROCEDURE — 25000132 ZZH RX MED GY IP 250 OP 250 PS 637: Performed by: OBSTETRICS & GYNECOLOGY

## 2019-02-15 PROCEDURE — 59025 FETAL NON-STRESS TEST: CPT

## 2019-02-15 PROCEDURE — 84112 EVAL AMNIOTIC FLUID PROTEIN: CPT | Performed by: OBSTETRICS & GYNECOLOGY

## 2019-02-15 PROCEDURE — 76816 OB US FOLLOW-UP PER FETUS: CPT | Mod: TC

## 2019-02-15 RX ORDER — TERBUTALINE SULFATE 1 MG/ML
0.25 INJECTION, SOLUTION SUBCUTANEOUS ONCE
Status: DISCONTINUED | OUTPATIENT
Start: 2019-02-15 | End: 2019-02-15 | Stop reason: HOSPADM

## 2019-02-15 RX ORDER — CEPHALEXIN 500 MG/1
500 CAPSULE ORAL ONCE
Status: COMPLETED | OUTPATIENT
Start: 2019-02-15 | End: 2019-02-15

## 2019-02-15 RX ORDER — CEFAZOLIN SODIUM 2 G/100ML
2 INJECTION, SOLUTION INTRAVENOUS ONCE
Status: DISCONTINUED | OUTPATIENT
Start: 2019-02-15 | End: 2019-02-15 | Stop reason: HOSPADM

## 2019-02-15 RX ORDER — CEPHALEXIN 500 MG/1
500 CAPSULE ORAL 3 TIMES DAILY
Qty: 30 CAPSULE | Refills: 0 | Status: SHIPPED | OUTPATIENT
Start: 2019-02-15 | End: 2019-06-19

## 2019-02-15 RX ADMIN — CEPHALEXIN 500 MG: 500 CAPSULE ORAL at 21:59

## 2019-02-15 ASSESSMENT — MIFFLIN-ST. JEOR: SCORE: 1283.39

## 2019-02-16 NOTE — PLAN OF CARE
Gerald Evans is a 23 year old  and 35w1d patient came in complaining of Rule Out Labor (R/O ROM)    Patient Active Problem List   Diagnosis     Hidradenitis suppurativa     Smoker     Blood type, Rh negative     Loose stools     Grief reaction     Anxiety and depression     Mild intermittent asthma     Need for Tdap vaccination     Need for immunization against influenza     NO SHOW     Encounter for triage in pregnant patient     Heartburn     Subchorionic hemorrhage in first trimester     Short interval between pregnancies affecting pregnancy in first trimester, antepartum     Vaginal bleeding     Previous  delivery, antepartum condition or complication     Previous  delivery, antepartum     Dyspareunia in female     History of ectopic pregnancy     History of      History of alcoholism (H)     Dysmenorrhea     Papanicolaou smear of cervix with low grade squamous intraepithelial lesion (LGSIL)     Allergic rhinitis due to other allergen     Anemia     Attention deficit hyperactivity disorder (ADHD)     Constipation     Esophageal reflux     Exercise-induced asthma     Migraine with aura     Oppositional defiant disorder     Panic disorder without agoraphobia     Encounter for supervision of other normal pregnancy, first trimester     Encounter for sterilization     [place]'s duct cyst     Pregnancy related bilateral lower abdominal cramping, antepartum--2018     Urinary tract infection in mother during first trimester of pregnancy--Macrobid x 7--2018       Pt discharged to home at 2125 and encouraged to rest and drink plenty of fluids.  Pt told to call/return if bleeding more than spotting, water breaks, contractions <10 minutes apart that she has to breath through.     Nursing education on UTI and  labor provided. Self-management instructions reviewed. New medications reviewed and where to  Keflex. AVS given and signed. All questions answered and patient  "verbalizes understanding.    /60 (BP Location: Left arm)   Pulse 99   Temp 98.3  F (36.8  C) (Oral)   Resp 12   Ht 1.575 m (5' 2\")   Wt 58.5 kg (129 lb)   LMP  (LMP Unknown)   SpO2 97%   BMI 23.59 kg/m      Cervical status: mid position, dilated to 1, effaced <30% and soft  Fetal Assessment: Reactive    Discharge support:  Independent-Alone    Obstetric History       T1      L3     SAB0   TAB0   Ectopic1   Multiple1   Live Births3       # Outcome Date GA Lbr Ward/2nd Weight Sex Delivery Anes PTL Lv   5 Current            4 AB 2016 10w0d    TAB      3A  16 31w4d    -SEC   ELLY   3B       -SEC   ELLY   2 Ectopic 2014     ECTOPIC      1 Term 12 39w0d  2.863 kg (6 lb 5 oz) F Vag-Spont   ELLY      Name: Gisele Preciado      "

## 2019-02-16 NOTE — PLAN OF CARE
Gerald Evans        NST:  reactive  Start:  Stop:    Physician: Dr. Pratt  Reason For Test: Rule Out  Labor  EDC:3/21/19  Gestational Age: 35     Comments:       Donna Preciado

## 2019-02-16 NOTE — DISCHARGE INSTRUCTIONS
Discharge Instructions for Undelivered Patients    Diet:  * Drink 8 to 12 glasses of liquids (milk, juice, water) every day  * You may eat meals and snacks.    Activity:  * Count fetal kicks every day.  * Call your doctor if your baby is moving less than usual.    Call your provider if you notice:  * Swelling in your face or increased swelling in your hands or legs.  * Headaches that are not relieved by Tylenol (acetaminophen).  * Changes in your vision (blurring; seeing spots or stars).  * Nausea (sick to your stomach) and vomiting (throwing up).  * Weight gain of 5 pounds per week.  * Heartburn that doesn't go away.  * Signs of bladder infection: Pain when you urinate (use the toilet), needing to go more often or more urgently.  * The bag of muniz (membrane) breaks, or you notice leaking in your underwear.  * Bright red blood in your underwear.  * Abdominal (lower belly) or stomach pain.  * Second (plus) baby: Contractions (tightenings) less than 10 minutes apart and getting stronger.  * Increase or change in vaginal discharge (note the color and amount).      [facility]: [phone]

## 2019-02-21 ENCOUNTER — TELEPHONE (OUTPATIENT)
Dept: OBGYN | Facility: HOSPITAL | Age: 24
End: 2019-02-21

## 2019-02-22 ENCOUNTER — HOSPITAL ENCOUNTER (EMERGENCY)
Facility: HOSPITAL | Age: 24
Discharge: HOME | End: 2019-02-22
Attending: NURSE PRACTITIONER | Admitting: NURSE PRACTITIONER
Payer: COMMERCIAL

## 2019-02-22 VITALS
WEIGHT: 129.2 LBS | DIASTOLIC BLOOD PRESSURE: 59 MMHG | TEMPERATURE: 96.5 F | BODY MASS INDEX: 23.63 KG/M2 | RESPIRATION RATE: 16 BRPM | OXYGEN SATURATION: 98 % | SYSTOLIC BLOOD PRESSURE: 94 MMHG

## 2019-02-22 DIAGNOSIS — Z20.828 EXPOSURE TO INFLUENZA: ICD-10-CM

## 2019-02-22 DIAGNOSIS — H92.03 OTALGIA, BILATERAL: ICD-10-CM

## 2019-02-22 PROCEDURE — 99213 OFFICE O/P EST LOW 20 MIN: CPT | Mod: Z6 | Performed by: NURSE PRACTITIONER

## 2019-02-22 PROCEDURE — G0463 HOSPITAL OUTPT CLINIC VISIT: HCPCS

## 2019-02-22 NOTE — ED AVS SNAPSHOT
[facility]  [address] [address]  Phone:  [phone]                                    Gerald Evans   MRN: [id number]    Department:  [facility]   Date of Visit:  2/22/2019           After Visit Summary Signature Page    I have received my discharge instructions, and my questions have been answered. I have discussed any challenges I see with this plan with the nurse or doctor.    ..........................................................................................................................................  Patient/Patient Representative Signature      ..........................................................................................................................................  Patient Representative Print Name and Relationship to Patient    ..................................................               ................................................  Date                                   Time    ..........................................................................................................................................  Reviewed by Signature/Title    ...................................................              ..............................................  Date                                               Time          22EPIC Rev 08/18

## 2019-02-22 NOTE — TELEPHONE ENCOUNTER
Obstetric Phone Triage- [place]    **REMEMBER: Review patient's prenatal record including complications with pregnancy and medications patient may be on (insulins, tocolytic, etc.)**      2019 10:36 PM  Geraldjb Silvabritt 1995   Home Phone [phone]   Mobile [phone]                       Last office visit/Cervix exam: 2019 Dr. Torres 2   G 5 P 3 EDC  3/21/2019 Gestational Age 36 weeks    Spoke with Gerald  Patient lives 2 miles away    Reason for call per pt:  Bright red vaginal bleeding    Is your provider aware of this concern? no    Did you have any complications with this or a previous pregnancy? (Pre-term labor, C/S, Previa, pre-eclampsia, diabetes) Twins .    Are you having contractions? Yes-possibly as patient is feeling cramping all day   -If yes: Contractions irregular    When did they start? 1300     Have you palpated your contractions?     Describe contraction discomfort: Can breath through but cramping is really strong                            Vaginal/rectal pressure: yes    What types of activity have you done in the past 24 hours? No    Have you had intercourse/anything in the vagina in the last 48 hours? Yes    Are you being treated for any vaginal infections? Yes- Not currently taking Keflex as it made patient feel ill    How much fluids have you been drinking? 8 glasses H2O per day    Are you having any bloody show/Bleeding? Yes   -If yes: Amount: Spotting     Color: Bright Red was Dark brown earlier in the day    [place]/anything vaginally in the last 24-48 hours? Yes    When did it start? After cervical exam    Pain since bleeding started? yes    Are you having any new vaginal discharge or are you leaking fluids/has your water broken? no    Is your baby moving normally? Yes    Additional Concerns (abd pain, headache, swelling, visual changes, etc): No    Patient Advised: You are always more than welcome to come in and be evaluated. If you decide to stay  home, Pt advised to come in and get checked out.  Pt will call back to let us know if she is coming in or not.     Call taken by: Gracy NEAL     Call back time:         Information:

## 2019-02-23 ENCOUNTER — HOSPITAL ENCOUNTER (OUTPATIENT)
Facility: HOSPITAL | Age: 24
Discharge: HOME | End: 2019-02-23
Attending: OBSTETRICS & GYNECOLOGY | Admitting: OBSTETRICS & GYNECOLOGY
Payer: COMMERCIAL

## 2019-02-23 LAB
ALBUMIN UR-MCNC: 30 MG/DL
APPEARANCE UR: CLEAR
BACTERIA #/AREA URNS HPF: ABNORMAL /HPF
BILIRUB UR QL STRIP: NEGATIVE
COLOR UR AUTO: YELLOW
GLUCOSE UR STRIP-MCNC: NEGATIVE MG/DL
HGB UR QL STRIP: NEGATIVE
KETONES UR STRIP-MCNC: NEGATIVE MG/DL
LEUKOCYTE ESTERASE UR QL STRIP: NEGATIVE
MUCOUS THREADS #/AREA URNS LPF: PRESENT /LPF
NITRATE UR QL: NEGATIVE
PH UR STRIP: 6.5 PH (ref 4.7–8)
RBC #/AREA URNS AUTO: 1 /HPF (ref 0–2)
RUPTURE OF FETAL MEMBRANES BY ROM PLUS: POSITIVE
SOURCE: ABNORMAL
SP GR UR STRIP: 1.02 (ref 1–1.03)
SQUAMOUS #/AREA URNS AUTO: 3 /HPF (ref 0–1)
UROBILINOGEN UR STRIP-MCNC: 2 MG/DL (ref 0–2)
WBC #/AREA URNS AUTO: 5 /HPF (ref 0–5)

## 2019-02-23 PROCEDURE — 84112 EVAL AMNIOTIC FLUID PROTEIN: CPT | Performed by: OBSTETRICS & GYNECOLOGY

## 2019-02-23 PROCEDURE — 59025 FETAL NON-STRESS TEST: CPT

## 2019-02-23 PROCEDURE — 81001 URINALYSIS AUTO W/SCOPE: CPT | Performed by: OBSTETRICS & GYNECOLOGY

## 2019-02-23 PROCEDURE — 59025 FETAL NON-STRESS TEST: CPT | Mod: 26 | Performed by: OBSTETRICS & GYNECOLOGY

## 2019-02-23 PROCEDURE — G0463 HOSPITAL OUTPT CLINIC VISIT: HCPCS | Mod: 25

## 2019-02-23 RX ORDER — OSELTAMIVIR PHOSPHATE 75 MG/1
75 CAPSULE ORAL DAILY
Qty: 10 CAPSULE | Refills: 0 | Status: SHIPPED | OUTPATIENT
Start: 2019-02-23 | End: 2019-06-19

## 2019-02-23 NOTE — PLAN OF CARE
Discharged patient via private vehicle with her significant other to [place] for planned .  Discharge instructions discussed with patient, answered all questions and patient verbalized understanding.  Called [facility] [phone] to update on patient status.

## 2019-02-23 NOTE — DISCHARGE INSTRUCTIONS
Go to [place] for your planned .    Discharge Instruction for Undelivered Patients      You were seen for: Membrane Assessment  We Consulted: Dr. Bethea  You had (Test or Medicine): NST and ROM Plus test for amniotic fluid     Diet:   Drink 8 to 12 glasses of liquids (milk, juice, water) every day.  You may eat meals and snacks.     Activity:  Count fetal kicks everyday (see handout)  Call your doctor or nurse midwife if your baby is moving less than usual.     Call your provider if you notice:  Swelling in your face or increased swelling in your hands or legs.  Headaches that are not relieved by Tylenol (acetaminophen).  Changes in your vision (blurring: seeing spots or stars.)  Nausea (sick to your stomach) and vomiting (throwing up).   Weight gain of 5 pounds or more per week.  Heartburn that doesn't go away.  Signs of bladder infection: pain when you urinate (use the toilet), need to go more often and more urgently.  The bag of muniz (rupture of membranes) breaks, or you notice leaking in your underwear.  Bright red blood in your underwear.  Abdominal (lower belly) or stomach pain.  Second (plus) baby: Contractions (tightening) less than 10 minutes apart and getting stronger.  Increase or change in vaginal discharge (note the color and amount)    Follow-up:  Go to [place] for planned . ROM plus positive.

## 2019-02-23 NOTE — PLAN OF CARE
OB Triage Note  Gerald Evans  MRN: [id number]  Gestational Age: 36w2d      Gerald Evans presents for rule out of rupture of membranes.  Dr. Rodriguez in [place] is primary OB provider and plan is for  in [place].  Patient reports a gush of fluid this morning and continued leaking through out trip to the hospital.  Patient only wants to check if amniotic fluid detected, refuses cervical check.  Wants to get to [place] for .  Patient called [place] to update them.      Clear fluid noted, moderate amount.  Denies bleeding or contractions.  Denies any pain at this time.    Dr. Bethea notified of arrival and condition.  Oriented patient to surroundings. Call light within reach.     FHT: 130  NST Start Time:0835  NST Stop Time:0900  NST: Reactive.  Uterine Assessment:  No contractions    Plan:  -Initial NST, then fetal/uterine monitoring per MD/patient plan.  -Patient refused cervical exam  -Nursing education on amnisure results, NST and  provided.

## 2019-02-23 NOTE — PLAN OF CARE
Gerald Evans        NST:  reactive  Start:  835  Stop:  0900    Physician: Dr. Bethea  Reason For Test:   Rule out rupture of membranes, patient transfer to [place] for planned .  EDC:3/21/2009  Gestational Age: 36w2d    Comments:  Patient has large amounts of leaking of fluid that is clear, no bleeding, no contractions.  Transfer to [facility] via private vehicle as soon as possible.      Axel Son

## 2019-02-23 NOTE — ED PROVIDER NOTES
History     Chief Complaint   Patient presents with     Otalgia     HPI  Gerald Evans is a 23 year old female who presents today with a CC of bilateral ear pain x 1-2 days.  She has been exposed to her family who has all been ill with influenza like illness.  She is 36 weeks pregnant.  She states baby has been moving well.  She had a cervical check on  and states she lost her mucous plug after that, has had some mucous vaginal discharge since.  No bleeding.  No fevers.  Has had some body aches.      Allergies:  Allergies   Allergen Reactions     Adderall      Amphetamine Aspartate Other (See Comments)     Aggressive  Adderall       Amphetamine Sulfate Other (See Comments)     Aggressive  Adderall     Amphetamine-Dextroamphetamine      Other reaction(s): Other - Describe In Comment Field  Gets violent     Dextroamphetamine Other (See Comments)     Aggressive  Adderall     Strawberry      Buspar [Buspirone] Rash       Problem List:    Patient Active Problem List    Diagnosis Date Noted     Pregnancy related bilateral lower abdominal cramping, antepartum--2018     Priority: Medium     Urinary tract infection in mother during first trimester of pregnancy--Macrobid x 7--2018     Priority: Medium     Encounter for supervision of other normal pregnancy, first trimester 2018     Priority: Medium     Encounter for sterilization 2018     Priority: Medium     Counseled, Sterilization request forms signed 18       [place]'s duct cyst 2018     Priority: Medium     Monitor during pregnacy       Papanicolaou smear of cervix with low grade squamous intraepithelial lesion (LGSIL) 2018     Priority: Medium     Repeat at annual (24)       Dyspareunia in female 2018     Priority: Medium     History of ectopic pregnancy 2018     Priority: Medium     History of  2018     Priority: Medium     History of alcoholism (H) 2018     Priority:  "Medium     Dysmenorrhea 2018     Priority: Medium     Previous  delivery, antepartum condition or complication 2016     Priority: Medium     Counsled. referral with Dr Kelsea Rodriguez at [facility] for planned  .  Planning vasectomy for BC.  Tubal if CS.   Prior left salpingectomy.  H/o panic d/o and required Gen anesthesia with prior cd.  Desires Gen Anesthesia for CD delivery.   Serial US growth (h/o IUGR), Placental lakes.  Q 4 weeks    Level 2 US for placentation/anantomy:  Placental site: posterior, no previa.  Placental lakes.   Needs sterilization forms signed. Done .   Plan breastfeeding  Declines Tdap  RHIG done  H/o  delivery (twins 31 weeks).   Delivery indication was IUGR with absent diastolic flow and NOT PTL.  LTCS.  Girl       Previous  delivery, antepartum 2016     Priority: Medium     Subchorionic hemorrhage in first trimester 2016     Priority: Medium     Refusing rhogam       Short interval between pregnancies affecting pregnancy in first trimester, antepartum 2016     Priority: Medium     Vaginal bleeding 2016     Priority: Medium     Refusing rhogam despite counseling       Heartburn 2015     Priority: Medium     Encounter for triage in pregnant patient 2015     Priority: Medium     NO SHOW 10/26/2015     Priority: Medium     No showed Dr. Fam 10/16/15; 12/23/15; 16; 16; 16; 16; 8/3/16; 16  Letter sent regarding multiple missed appointments 12/23/15.         Need for immunization against influenza 09/15/2015     Priority: Medium     refuses       Anxiety and depression 2015     Priority: Medium     tools       Mild intermittent asthma 2015     Priority: Medium     Need for Tdap vaccination 2015     Priority: Medium     Refuses despite counseling       Grief reaction 2015     Priority: Medium     8/12/15: Mother \"on life-support in [place] with <10% chance " "survival\"       Loose stools 2015     Priority: Medium     Blood type, Rh negative 2015     Priority: Medium     Rhogam done 12/15/15       Hidradenitis suppurativa 2013     Priority: Medium     Smoker 2013     Priority: Medium     Trying to quit       Panic disorder without agoraphobia 2013     Priority: Medium     Overview:   [facility]       Esophageal reflux 2012     Priority: Medium     Anemia 02/10/2012     Priority: Medium     Overview:   IMO Update 10/11       Migraine with aura 2010     Priority: Medium     Overview:   IMO Update       Exercise-induced asthma 2009     Priority: Medium     Overview:   Mild       Allergic rhinitis due to other allergen 2008     Priority: Medium     Oppositional defiant disorder 2008     Priority: Medium     Overview:   IMO Update 10/11       Constipation 09/10/2007     Priority: Medium     Overview:   IMO Update       Attention deficit hyperactivity disorder (ADHD) 2003     Priority: Medium     Overview:   IMO Update 10 2016          Past Medical History:    No past medical history on file.    Past Surgical History:    Past Surgical History:   Procedure Laterality Date     BIOPSY OF SKIN LESION  2013      SECTION  2016    [facility].     ENT SURGERY      adenoidectomy     ENT SURGERY      anesthesia for tooth work     LAPAROSCOPIC SALPINGECTOMY Left 2014    Procedure: LAPAROSCOPIC SALPINGECTOMY;  Surgeon: Leonel Bethea MD;  Location: [facility] OR       Family History:    Family History   Problem Relation Age of Onset     Heart Disease Mother         stent placement     C.A.D. Mother      Diabetes Paternal Grandmother      Hypertension Paternal Grandmother        Social History:  Marital Status:  Single [1]  Social History     Tobacco Use     Smoking status: Current Every Day Smoker     Packs/day: 0.50     Years: 7.00     Pack years: 3.50     Types: Cigarettes     Smokeless tobacco: Never Used "   Substance Use Topics     Alcohol use: Yes     Comment: occas     Drug use: No        Medications:      oseltamivir (TAMIFLU) 75 MG capsule   Acetaminophen (TYLENOL PO)   cephALEXin (KEFLEX) 500 MG capsule   loratadine (CLARITIN) 10 MG tablet   omeprazole (PRILOSEC) 10 MG CR capsule   Prenatal Vit-Fe Fumarate-FA (PRENATAL MULTIVITAMIN PLUS IRON) 27-0.8 MG TABS per tablet         Review of Systems   Constitutional: Positive for fatigue. Negative for chills and fever.   HENT: Positive for congestion and ear pain. Negative for ear discharge.    Gastrointestinal: Negative for abdominal pain and vomiting.   Genitourinary: Positive for vaginal discharge (as per HPI). Negative for dysuria, vaginal bleeding and vaginal pain.   Neurological: Negative for dizziness and headaches.       Physical Exam   BP: 94/59  Heart Rate: 105  Temp: 96.5  F (35.8  C)  Resp: 16  Weight: 58.6 kg (129 lb 3.2 oz)  SpO2: 98 %      Physical Exam   Constitutional: Vital signs are normal. She appears well-developed. She is cooperative. She does not appear ill.   HENT:   Head: Normocephalic and atraumatic.   Right Ear: Tympanic membrane, external ear and ear canal normal.   Left Ear: Tympanic membrane, external ear and ear canal normal.   Mouth/Throat: Uvula is midline and oropharynx is clear and moist.   Eyes: Conjunctivae are normal.   Neck: Normal range of motion. Neck supple.   Cardiovascular: Normal rate and regular rhythm.   Pulmonary/Chest: Effort normal and breath sounds normal.   Musculoskeletal: Normal range of motion.   Lymphadenopathy:     She has no cervical adenopathy.   Neurological: She is alert.   Skin: Skin is warm and dry.   Psychiatric: She has a normal mood and affect. Her behavior is normal.   Nursing note and vitals reviewed.      ED Course        Procedures      Assessments & Plan (with Medical Decision Making)     I have reviewed the nursing notes.    I have reviewed the findings, diagnosis, plan and need for follow up with  the patient.  It sounds like the whole house has had influenza like illness, her 3 year old son has current fever, cough, sore throat that is highly suspicious for influenza, influenza testing is pending on him.  Plan was to call her in the morning when test was complete, and start her on Tamiflu prophylactic dosing.  Called her at 11 am to tell her that influenza test was positive on her son, she advised me that her water had broke in the middle of the night and she was on the way to [place] to give birth.  Tamiflu had already been sent to  [facility].  Advised her to make sure her OB team was aware that she had been exposed to influenza.  She verbalized understanding.      Final diagnoses:   Otalgia, bilateral   Exposure to influenza       2/22/2019   [facility]     Jenna Alexander NP  02/24/19 [id number]

## 2019-02-23 NOTE — ED TRIAGE NOTES
Pt states she is 36 weeks pregnant and reports baby is moving appropriately. Pt reports ear pain started today. Both ears.

## 2019-02-24 ASSESSMENT — ENCOUNTER SYMPTOMS
CHILLS: 0
FATIGUE: 1
DYSURIA: 0
ABDOMINAL PAIN: 0
FEVER: 0
DIZZINESS: 0
VOMITING: 0
HEADACHES: 0

## 2019-06-19 ENCOUNTER — OFFICE VISIT (OUTPATIENT)
Dept: INTERNAL MEDICINE | Facility: OTHER | Age: 24
End: 2019-06-19
Attending: INTERNAL MEDICINE
Payer: COMMERCIAL

## 2019-06-19 VITALS
DIASTOLIC BLOOD PRESSURE: 60 MMHG | OXYGEN SATURATION: 98 % | RESPIRATION RATE: 18 BRPM | TEMPERATURE: 98.5 F | SYSTOLIC BLOOD PRESSURE: 101 MMHG | HEART RATE: 79 BPM

## 2019-06-19 DIAGNOSIS — B34.9 VIRAL SYNDROME: Primary | ICD-10-CM

## 2019-06-19 DIAGNOSIS — J02.9 PHARYNGITIS, UNSPECIFIED ETIOLOGY: ICD-10-CM

## 2019-06-19 LAB
DEPRECATED S PYO AG THROAT QL EIA: NORMAL
SPECIMEN SOURCE: NORMAL

## 2019-06-19 PROCEDURE — 87880 STREP A ASSAY W/OPTIC: CPT | Mod: ZL | Performed by: INTERNAL MEDICINE

## 2019-06-19 PROCEDURE — G0463 HOSPITAL OUTPT CLINIC VISIT: HCPCS

## 2019-06-19 PROCEDURE — 87081 CULTURE SCREEN ONLY: CPT | Mod: ZL | Performed by: INTERNAL MEDICINE

## 2019-06-19 PROCEDURE — 99202 OFFICE O/P NEW SF 15 MIN: CPT | Performed by: INTERNAL MEDICINE

## 2019-06-19 ASSESSMENT — PAIN SCALES - GENERAL: PAINLEVEL: MODERATE PAIN (5)

## 2019-06-19 NOTE — PROGRESS NOTES
Subjective     Gerald Evans is a 24 year old female who presents to clinic today for the following health issues:    HPI   RESPIRATORY SYMPTOMS      Duration: 1 week    Description  nasal congestion, sore throat, cough, fever, chills, fatigue/malaise, nausea and diarrhea    Severity: moderate    Accompanying signs and symptoms: see above    History (predisposing factors):  none    Precipitating or alleviating factors: None    Therapies tried and outcome:  none        Gearld works at [employer] with chronic exposure to ill children and co-workers.  Her boys were recently diagnosed with viral illness.    Patient Active Problem List   Diagnosis     Hidradenitis suppurativa     Smoker     Blood type, Rh negative     Loose stools     Grief reaction     Anxiety and depression     Mild intermittent asthma     Need for Tdap vaccination     Need for immunization against influenza     NO SHOW     Encounter for triage in pregnant patient     Heartburn     Subchorionic hemorrhage in first trimester     Short interval between pregnancies affecting pregnancy in first trimester, antepartum     Vaginal bleeding     Previous  delivery, antepartum condition or complication     Previous  delivery, antepartum     Dyspareunia in female     History of ectopic pregnancy     History of      History of alcoholism (H)     Dysmenorrhea     Papanicolaou smear of cervix with low grade squamous intraepithelial lesion (LGSIL)     Allergic rhinitis due to other allergen     Anemia     Attention deficit hyperactivity disorder (ADHD)     Constipation     Esophageal reflux     Exercise-induced asthma     Migraine with aura     Oppositional defiant disorder     Panic disorder without agoraphobia     Encounter for supervision of other normal pregnancy, first trimester     Encounter for sterilization     [place]'s duct cyst     Pregnancy related bilateral lower abdominal cramping, antepartum--2018     Urinary tract  infection in mother during first trimester of pregnancy--Macrobid x 7--2018     Past Surgical History:   Procedure Laterality Date     BIOPSY OF SKIN LESION  2013      SECTION  2016    [facility].     ENT SURGERY      adenoidectomy     ENT SURGERY      anesthesia for tooth work     LAPAROSCOPIC SALPINGECTOMY Left 2014    Procedure: LAPAROSCOPIC SALPINGECTOMY;  Surgeon: Leonel Bethea MD;  Location: [facility] OR       Social History     Tobacco Use     Smoking status: Current Every Day Smoker     Packs/day: 0.50     Years: 7.00     Pack years: 3.50     Types: Cigarettes     Smokeless tobacco: Never Used   Substance Use Topics     Alcohol use: Yes     Comment: occas     Family History   Problem Relation Age of Onset     Heart Disease Mother         stent placement     C.A.D. Mother      Diabetes Paternal Grandmother      Hypertension Paternal Grandmother            -------------------------------------  Reviewed and updated as needed this visit by Provider         Review of Systems   ROS COMP: CONSTITUTIONAL:POSITIVE  for chills, fever , myalgias and sweats and NEGATIVE  for weight gain and weight loss  INTEGUMENTARY/SKIN: NEGATIVE for worrisome rashes, moles or lesions  EYES: NEGATIVE for vision changes or irritation  ENT/MOUTH: NEGATIVE for ear, mouth and throat problems  RESP: NEGATIVE for significant cough or SOB  CV: NEGATIVE for chest pain, palpitations or peripheral edema  GI: POSITIVE for diarrhea and nausea and NEGATIVE for gas or bloating and vomiting  : NEGATIVE for frequency, dysuria, or hematuria  PSYCHIATRIC: NEGATIVE for changes in mood or affect      Objective    /60 (BP Location: Right arm, Patient Position: Sitting, Cuff Size: Adult Regular)   Pulse 79   Temp 98.5  F (36.9  C) (Tympanic)   Resp 18   SpO2 98%   There is no height or weight on file to calculate BMI.  Physical Exam   GENERAL: alert, no distress and fatigued  EYES: Eyes grossly normal to inspection and  conjunctivae and sclerae normal  HENT: ear canals and TM's normal, nose and mouth without ulcers or lesions  NECK: no adenopathy, no asymmetry, masses, or scars and thyroid normal to palpation  RESP: lungs clear to auscultation - no rales, rhonchi or wheezes  CV: regular rate and rhythm, normal S1 S2, no S3 or S4, no murmur, click or rub, no peripheral edema and peripheral pulses strong  MS: no gross musculoskeletal defects noted, no edema          Labs:  Pending rapid strep.      Imaging:  NA      ASSESSMENT /PLAN:  (B34.9) Viral syndrome  (primary encounter diagnosis)  Com men::  Gerald is having very similar symptoms to her infant and this is likely adenovirus give the congestion, diarrhea and eye watering in the child.   Plan:   Reassurance given.    (J02.9) Pharyngitis, unspecified etiology  Comment: Low suspicion for strep throat, but give her reported history of repeat strep throat infections she was swabbed for culture today.  Plan:   Rapid strep screen        Follow up with Provider - BETHANY Villatoro DO

## 2019-06-19 NOTE — NURSING NOTE
"Chief Complaint   Patient presents with     Cough       Initial /60 (BP Location: Right arm, Patient Position: Sitting, Cuff Size: Adult Regular)   Pulse 79   Temp 98.5  F (36.9  C) (Tympanic)   Resp 18   SpO2 98%  Estimated body mass index is 23.63 kg/m  as calculated from the following:    Height as of 2/15/19: 1.575 m (5' 2\").    Weight as of 2/22/19: 58.6 kg (129 lb 3.2 oz).  Medication Reconciliation: complete      "

## 2019-06-21 LAB
BACTERIA SPEC CULT: NORMAL
SPECIMEN SOURCE: NORMAL

## 2019-08-06 ENCOUNTER — HOSPITAL ENCOUNTER (EMERGENCY)
Facility: HOSPITAL | Age: 24
Discharge: HOME | End: 2019-08-06
Attending: NURSE PRACTITIONER | Admitting: NURSE PRACTITIONER
Payer: COMMERCIAL

## 2019-08-06 VITALS
RESPIRATION RATE: 16 BRPM | OXYGEN SATURATION: 99 % | TEMPERATURE: 98.1 F | SYSTOLIC BLOOD PRESSURE: 105 MMHG | DIASTOLIC BLOOD PRESSURE: 68 MMHG

## 2019-08-06 DIAGNOSIS — B34.9 VIRAL INFECTION: ICD-10-CM

## 2019-08-06 PROCEDURE — 99213 OFFICE O/P EST LOW 20 MIN: CPT | Performed by: NURSE PRACTITIONER

## 2019-08-06 PROCEDURE — G0463 HOSPITAL OUTPT CLINIC VISIT: HCPCS

## 2019-08-06 RX ORDER — ONDANSETRON 4 MG/1
8 TABLET, FILM COATED ORAL EVERY 12 HOURS PRN
Qty: 6 TABLET | Refills: 0 | Status: SHIPPED | OUTPATIENT
Start: 2019-08-06 | End: 2021-03-21

## 2019-08-06 ASSESSMENT — ENCOUNTER SYMPTOMS
HEADACHES: 1
FEVER: 0
SINUS PRESSURE: 1
NAUSEA: 1
SHORTNESS OF BREATH: 0
RHINORRHEA: 0
ABDOMINAL PAIN: 1
CHILLS: 1
MYALGIAS: 1
COUGH: 1
SINUS PAIN: 1
VOMITING: 0
DIARRHEA: 1
SORE THROAT: 1
EYES NEGATIVE: 1

## 2019-08-06 NOTE — ED AVS SNAPSHOT
[facility]  [address] [address]  Phone:  [phone]                                    Gerald Evans   MRN: [id number]    Department:  [facility]   Date of Visit:  8/6/2019           After Visit Summary Signature Page    I have received my discharge instructions, and my questions have been answered. I have discussed any challenges I see with this plan with the nurse or doctor.    ..........................................................................................................................................  Patient/Patient Representative Signature      ..........................................................................................................................................  Patient Representative Print Name and Relationship to Patient    ..................................................               ................................................  Date                                   Time    ..........................................................................................................................................  Reviewed by Signature/Title    ...................................................              ..............................................  Date                                               Time          22EPIC Rev 08/18

## 2019-08-07 NOTE — ED PROVIDER NOTES
History     Chief Complaint   Patient presents with     Otalgia     bilateral ear pain     HPI  Gerald Evans is a 24 year old female who presents with nausea, diarrhea, bilateral ear pain, cough, muscle aches and pains, sore throat, and sinus pain and pressure for three days. She had two diarrhea stools today at work.  She denies current antibiotic use. She took Zofran at home today. She does have a history of irritable bowel syndrome. Denies fevers, vomiting, and shortness of breath.      Allergies:  Allergies   Allergen Reactions     Adderall      Amphetamine Aspartate Other (See Comments)     Aggressive  Adderall       Amphetamine Sulfate Other (See Comments)     Aggressive  Adderall     Amphetamine-Dextroamphetamine      Other reaction(s): Other - Describe In Comment Field  Gets violent     Dextroamphetamine Other (See Comments)     Aggressive  Adderall     Strawberry      Buspar [Buspirone] Rash       Problem List:    Patient Active Problem List    Diagnosis Date Noted     Pregnancy related bilateral lower abdominal cramping, antepartum--2018     Priority: Medium     Urinary tract infection in mother during first trimester of pregnancy--Macrobid x 7--2018     Priority: Medium     Encounter for supervision of other normal pregnancy, first trimester 2018     Priority: Medium     Encounter for sterilization 2018     Priority: Medium     Counseled, Sterilization request forms signed 18       [place]'s duct cyst 2018     Priority: Medium     Monitor during pregnacy       Papanicolaou smear of cervix with low grade squamous intraepithelial lesion (LGSIL) 2018     Priority: Medium     Repeat at annual (24)       Dyspareunia in female 2018     Priority: Medium     History of ectopic pregnancy 2018     Priority: Medium     History of  2018     Priority: Medium     History of alcoholism (H) 2018     Priority: Medium      "Dysmenorrhea 2018     Priority: Medium     Previous  delivery, antepartum condition or complication 2016     Priority: Medium     Counsled. referral with Dr Kelsea Rodriguez at [facility] for planned  .  Planning vasectomy for BC.  Tubal if CS.   Prior left salpingectomy.  H/o panic d/o and required Gen anesthesia with prior cd.  Desires Gen Anesthesia for CD delivery.   Serial US growth (h/o IUGR), Placental lakes.  Q 4 weeks    Level 2 US for placentation/anantomy:  Placental site: posterior, no previa.  Placental lakes.   Needs sterilization forms signed. Done .   Plan breastfeeding  Declines Tdap  RHIG done  H/o  delivery (twins 31 weeks).   Delivery indication was IUGR with absent diastolic flow and NOT PTL.  LTCS.  Girl       Previous  delivery, antepartum 2016     Priority: Medium     Subchorionic hemorrhage in first trimester 2016     Priority: Medium     Refusing rhogam       Short interval between pregnancies affecting pregnancy in first trimester, antepartum 2016     Priority: Medium     Vaginal bleeding 2016     Priority: Medium     Refusing rhogam despite counseling       Heartburn 2015     Priority: Medium     Encounter for triage in pregnant patient 2015     Priority: Medium     NO SHOW 10/26/2015     Priority: Medium     No showed Dr. Fam 10/16/15; 12/23/15; 16; 16; 16; 16; 8/3/16; 16  Letter sent regarding multiple missed appointments 12/23/15.         Need for immunization against influenza 09/15/2015     Priority: Medium     refuses       Anxiety and depression 2015     Priority: Medium     tools       Mild intermittent asthma 2015     Priority: Medium     Need for Tdap vaccination 2015     Priority: Medium     Refuses despite counseling       Grief reaction 2015     Priority: Medium     8/12/15: Mother \"on life-support in [place] with <10% chance survival\"   "     Loose stools 2015     Priority: Medium     Blood type, Rh negative 2015     Priority: Medium     Rhogam done 12/15/15       Hidradenitis suppurativa 2013     Priority: Medium     Smoker 2013     Priority: Medium     Trying to quit       Panic disorder without agoraphobia 2013     Priority: Medium     Overview:   [facility]       Esophageal reflux 2012     Priority: Medium     Anemia 02/10/2012     Priority: Medium     Overview:   IMO Update 10/11       Migraine with aura 2010     Priority: Medium     Overview:   IMO Update       Exercise-induced asthma 2009     Priority: Medium     Overview:   Mild       Allergic rhinitis due to other allergen 2008     Priority: Medium     Oppositional defiant disorder 2008     Priority: Medium     Overview:   IMO Update 10/11       Constipation 09/10/2007     Priority: Medium     Overview:   IMO Update       Attention deficit hyperactivity disorder (ADHD) 2003     Priority: Medium     Overview:   IMO Update 10 2016          Past Medical History:    History reviewed. No pertinent past medical history.    Past Surgical History:    Past Surgical History:   Procedure Laterality Date     BIOPSY OF SKIN LESION  2013      SECTION  2016    [facility].     ENT SURGERY      adenoidectomy     ENT SURGERY      anesthesia for tooth work     LAPAROSCOPIC SALPINGECTOMY Left 2014    Procedure: LAPAROSCOPIC SALPINGECTOMY;  Surgeon: Leonel Bethea MD;  Location: [facility]       Family History:    Family History   Problem Relation Age of Onset     Heart Disease Mother         stent placement     C.A.D. Mother      Diabetes Paternal Grandmother      Hypertension Paternal Grandmother        Social History:  Marital Status:  Single [1]  Social History     Tobacco Use     Smoking status: Current Every Day Smoker     Packs/day: 0.50     Years: 7.00     Pack years: 3.50     Types: Cigarettes     Smokeless tobacco: Never  Used   Substance Use Topics     Alcohol use: Yes     Comment: occas     Drug use: No        Medications:      ondansetron (ZOFRAN) 4 MG tablet   Acetaminophen (TYLENOL PO)   loratadine (CLARITIN) 10 MG tablet   omeprazole (PRILOSEC) 10 MG CR capsule         Review of Systems   Constitutional: Positive for chills (hot and cold flashes). Negative for fever.   HENT: Positive for congestion, ear pain, sinus pressure, sinus pain and sore throat. Negative for rhinorrhea.    Eyes: Negative.    Respiratory: Positive for cough. Negative for shortness of breath.         Smoker   Gastrointestinal: Positive for abdominal pain, diarrhea and nausea. Negative for vomiting.   Genitourinary: Negative.    Musculoskeletal: Positive for myalgias.   Skin: Negative.    Neurological: Positive for headaches.       Physical Exam   BP: 105/68  Heart Rate: 103  Temp: 98.1  F (36.7  C)  Resp: 16  SpO2: 99 %      Physical Exam   Constitutional: She is oriented to person, place, and time. She appears well-developed and well-nourished. No distress.   HENT:   Head: Normocephalic.   Right Ear: Tympanic membrane and ear canal normal.   Left Ear: Tympanic membrane and ear canal normal.   Nose: Rhinorrhea present. Right sinus exhibits maxillary sinus tenderness. Right sinus exhibits no frontal sinus tenderness. Left sinus exhibits maxillary sinus tenderness. Left sinus exhibits no frontal sinus tenderness.   Mouth/Throat: Uvula is midline. Posterior oropharyngeal erythema present. Tonsils are 1+ on the right. Tonsils are 1+ on the left.   Eyes: Conjunctivae are normal.   Neck: Normal range of motion.   Cardiovascular: Regular rhythm and normal heart sounds. Tachycardia present.   No murmur heard.  Pulmonary/Chest: Effort normal and breath sounds normal. She has no wheezes.   Abdominal: Soft. Bowel sounds are normal. She exhibits no distension. There is no tenderness.   Musculoskeletal: Normal range of motion.   Lymphadenopathy:     She has no cervical  "adenopathy.   Neurological: She is alert and oriented to person, place, and time.   Skin: Skin is warm and dry. Capillary refill takes less than 2 seconds.   Psychiatric: She has a normal mood and affect.   Nursing note and vitals reviewed.      ED Course        Procedures               No results found for this or any previous visit (from the past 24 hour(s)).    Medications - No data to display    Assessments & Plan (with Medical Decision Making)     I have reviewed the nursing notes.    I have reviewed the findings, diagnosis, plan and need for follow up with the patient.  Viral upper respiratory infection. Treat symptomatically. Ondansetron ordered for nausea. She states she takes 8 mg at home for her nausea. Six tablets of Zofran prescribed. Work excuse so she can stay home for 24 hours to watch baby (started antibiotics for conjunctivitis). Will collect stool sample if she continues to have loose stools. Discharge instructions and medications reviewed with her and understanding verbalized.    8/7/2019, 1700: spoke with her today and she is no longer having loose stools. States the Zofran must have, \"kicked in.\"        Medication List      Started    ondansetron 4 MG tablet  Commonly known as:  ZOFRAN  8 mg, Oral, EVERY 12 HOURS PRN            Final diagnoses:   Viral infection       8/6/2019   [facility]     Marquita Turner, CNP  08/07/19 1708    "

## 2019-08-07 NOTE — DISCHARGE INSTRUCTIONS
Increase oral intake, vaporizer as needed, rest, avoid sharing utensils, practice good hand washing techniques, cover mouth when you cough and sneeze. Throw your toothbrush away 24 hours after starting on antibiotics. Over the counter medications such as ibuprofen and/or acetaminophen for fever and generalized aches and pains. Robitussin DM for cough. Pseudoephedrine for congestion. Chloraseptic spray for sore throat. Be reevaluated if symptoms persist longer than 10 - 14 days or worsen and if there is no improvement or worsening of symptoms in 24 to 48 hours.   Keep hydrated.   Bring stool sample in for testing for C-difficile

## 2020-01-25 ENCOUNTER — HOSPITAL ENCOUNTER (EMERGENCY)
Facility: HOSPITAL | Age: 25
Discharge: HOME | End: 2020-01-25
Attending: NURSE PRACTITIONER | Admitting: NURSE PRACTITIONER
Payer: COMMERCIAL

## 2020-01-25 VITALS
BODY MASS INDEX: 22.86 KG/M2 | OXYGEN SATURATION: 100 % | RESPIRATION RATE: 16 BRPM | SYSTOLIC BLOOD PRESSURE: 94 MMHG | DIASTOLIC BLOOD PRESSURE: 61 MMHG | WEIGHT: 125 LBS | TEMPERATURE: 98.3 F

## 2020-01-25 DIAGNOSIS — J02.9 PHARYNGITIS, ACUTE: ICD-10-CM

## 2020-01-25 LAB
DEPRECATED S PYO AG THROAT QL EIA: NORMAL
SPECIMEN SOURCE: NORMAL

## 2020-01-25 PROCEDURE — 99213 OFFICE O/P EST LOW 20 MIN: CPT | Mod: Z6 | Performed by: NURSE PRACTITIONER

## 2020-01-25 PROCEDURE — 87880 STREP A ASSAY W/OPTIC: CPT | Performed by: NURSE PRACTITIONER

## 2020-01-25 PROCEDURE — G0463 HOSPITAL OUTPT CLINIC VISIT: HCPCS

## 2020-01-25 PROCEDURE — 87081 CULTURE SCREEN ONLY: CPT | Performed by: NURSE PRACTITIONER

## 2020-01-25 ASSESSMENT — ENCOUNTER SYMPTOMS
SORE THROAT: 1
CHILLS: 0
ACTIVITY CHANGE: 1
FEVER: 0
GASTROINTESTINAL NEGATIVE: 1
RESPIRATORY NEGATIVE: 1
TROUBLE SWALLOWING: 1

## 2020-01-25 NOTE — ED AVS SNAPSHOT
[facility]  [address] [address]  Phone:  [phone]                                    Gerald Evans   MRN: [id number]    Department:  [facility]   Date of Visit:  1/25/2020           After Visit Summary Signature Page    I have received my discharge instructions, and my questions have been answered. I have discussed any challenges I see with this plan with the nurse or doctor.    ..........................................................................................................................................  Patient/Patient Representative Signature      ..........................................................................................................................................  Patient Representative Print Name and Relationship to Patient    ..................................................               ................................................  Date                                   Time    ..........................................................................................................................................  Reviewed by Signature/Title    ...................................................              ..............................................  Date                                               Time          22EPIC Rev 08/18

## 2020-01-26 NOTE — DISCHARGE INSTRUCTIONS
Increase oral intake, cool mist vaporizer as needed, rest, avoid sharing utensils, practice good hand washing techniques, cover mouth when you cough and sneeze. Throw toothbursh away 24 hours after starting antibiotics.  Over the counter medications such as ibuprofen and/or acetaminophen for fever and generalized aches and pains. Ibuprofen 400 to 800 mg (2 - 4 tabs of over the counter med) every six to eight hours as needed;not to exceed maximum amount of 3200 mg in 24 hours.Tylenol 650 to 1000 mg every four to six hours as needed (not to exceed more than 4000 mg in a 24 hour period). May use interchangeably. Robitussin (guaifenesin) for cough. Chest rubs such as Tavon's or Mentholatum may help reduce sore throat symptoms.  Chloraseptic spray for sore throat or menthol lozenges may be helpful for sore throat. Be reevaluated if symptoms persist longer than 10 - 14 days or worsen and if there is no improvement in 72 hours or worsening of symptoms.  Increase fluids. Complete all antibiotics even if feeling better. Taking antibiotics with food may decrease the stomach upset that can occur when taking antibiotics. Antibiotics frequently cause diarrhea. Probiotics or yogurt may help prevent or decrease these symptoms.     OTC decongestants (oral or topical).  Decongestants (oral or topical) cause vasoconstriction of the nasal mucosa.  We prefer oral pseudoephedrine to phenylephrine and other oral OTC nasal decongestants. Side effects of oral decongestants may include tachycardia, elevated diastolic blood pressure, and palpitations. Pseudoephedrine 30 to 60 mg every four to six hours as needed for congestion. (Maximum dose is 240 mg in 24 hours). Do not use longer than 72 hours.    Commonly used topical decongestants include oxymetazoline, xylometazoline, and phenylephrine. Side effects of topical decongestants include nosebleeds and drying of the nasal membranes. Topical decongestants should only be used for two to three  days; longer use may result in rebound nasal congestion after discontinuation.    Fluids, herbs, and foods for sore throat relief -- Adjusting the temperature and texture of foods and beverages may provide local relief of sore throat pain. While data showing benefit are quite limited, these approaches are intuitive. We typically advise these measures since they are likely to be safe with minimal adverse effect, and patients often describe relief of symptoms.  We suggest hydration with frozen (eg, ice or popsicles) or heated liquids (eg, teas, soups), rather than room temperature or refrigerated fluids in patient with significant sore throat pain. Very cold foods can have a numbing-like effect that temporarily reduces or alleviates the pain of swallowing. Ice cubes or frozen popsicles facilitate hydration; ice cream and frozen yogurt provide caloric intake.  Warm fluids and foods, including teas, soups, and soft non-irritating foods, may be better tolerated by patients with throat pain than irritating foods (eg, rough-textured or spicy foods) or fluids at room temperatures. Foods that coat the throat, including honey and hard candies, can facilitate intake of calories while temporarily relieving throat pain.

## 2020-01-26 NOTE — ED PROVIDER NOTES
History     Chief Complaint   Patient presents with     Pharyngitis     Otalgia     HPI  Gerald Evans is a 24 year old female who presents with URI symptoms that started yesterday. Her symptoms include bilateral ear pain, and sore throat with painful swallowing. She has taken ibuprofen and acetaminophen at home with little relief. She is a smoker. Denies fevers, chills, nausea, vomiting, diarrhea, and shortness of breath.    Accompanied per: self  Length of illness: yesterday  Medications or interventions for discomfort: ibuprofen and acetaminophen  Last dose of OTC: yesterday  Sick contacts: no known  Smoker or second hand smoke: smoker   Immunizations up to date: no flu vaccine    RESPIRATORY SYMPTOMS      Duration: yesterday    Description  sore throat, ear pain bilateral and nausea    Severity: severe    Accompanying signs and symptoms:         History (predisposing factors):  tobacco abuse, history of asthma as a child    Precipitating or alleviating factors: ibuprofen and acetaminophen yesterday at three     Therapies tried and outcome:  acetaminophen and ibuprofen         Allergies:  Allergies   Allergen Reactions     Adderall      Amphetamine Aspartate Other (See Comments)     Aggressive  Adderall       Amphetamine Sulfate Other (See Comments)     Aggressive  Adderall     Amphetamine-Dextroamphetamine      Other reaction(s): Other - Describe In Comment Field  Gets violent     Dextroamphetamine Other (See Comments)     Aggressive  Adderall     Strawberry      Buspar [Buspirone] Rash       Problem List:    Patient Active Problem List    Diagnosis Date Noted     Pregnancy related bilateral lower abdominal cramping, antepartum--8/24/2018 08/23/2018     Priority: Medium     Urinary tract infection in mother during first trimester of pregnancy--Macrobid x 7--8/24/2018 08/23/2018     Priority: Medium     Encounter for supervision of other normal pregnancy, first trimester 07/26/2018     Priority: Medium      Encounter for sterilization 2018     Priority: Medium     Counseled, Sterilization request forms signed 18       [place]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 referral with Dr Kelsea Rodriguez at [facility] for planned  .  Planning vasectomy for BC.  Tubal if CS.   Prior left salpingectomy.  H/o panic d/o and required Gen anesthesia with prior cd.  Desires Gen Anesthesia for CD delivery.   Serial US growth (h/o IUGR), Placental lakes.  Q 4 weeks    Level 2 US for placentation/anantomy:  Placental site: posterior, no previa.  Placental lakes.   Needs sterilization forms signed. Done .   Plan breastfeeding  Declines Tdap  RHIG done  H/o  delivery (twins 31 weeks).   Delivery indication was IUGR with absent diastolic flow and NOT PTL.  LTCS.  Girl       Previous  delivery, antepartum 2016     Priority: Medium     Subchorionic hemorrhage in first trimester 2016     Priority: Medium     Refusing rhogam       Short interval between pregnancies affecting pregnancy in first trimester, antepartum 2016     Priority: Medium     Vaginal bleeding 2016     Priority: Medium     Refusing rhogam despite counseling       Heartburn 2015     Priority: Medium     Encounter for triage in pregnant patient 2015     Priority: Medium     NO SHOW 10/26/2015     Priority: Medium     No  "showed Dr. Fam 10/16/15; 12/23/15; 16; 16; 16; 16; 8/3/16; 16  Letter sent regarding multiple missed appointments 12/23/15.         Need for immunization against influenza 09/15/2015     Priority: Medium     refuses       Anxiety and depression 2015     Priority: Medium     tools       Mild intermittent asthma 2015     Priority: Medium     Need for Tdap vaccination 2015     Priority: Medium     Refuses despite counseling       Grief reaction 2015     Priority: Medium     8/12/15: Mother \"on life-support in [place] with <10% chance survival\"       Loose stools 2015     Priority: Medium     Blood type, Rh negative 2015     Priority: Medium     Rhogam done 12/15/15       Hidradenitis suppurativa 2013     Priority: Medium     Smoker 2013     Priority: Medium     Trying to quit       Panic disorder without agoraphobia 2013     Priority: Medium     Overview:   HC       Esophageal reflux 2012     Priority: Medium     Anemia 02/10/2012     Priority: Medium     Overview:   IMO Update 10/11       Migraine with aura 2010     Priority: Medium     Overview:   IMO Update       Exercise-induced asthma 2009     Priority: Medium     Overview:   Mild       Allergic rhinitis due to other allergen 2008     Priority: Medium     Oppositional defiant disorder 2008     Priority: Medium     Overview:   IMO Update 10/11       Constipation 09/10/2007     Priority: Medium     Overview:   IMO Update       Attention deficit hyperactivity disorder (ADHD) 2003     Priority: Medium     Overview:   IMO Update 10 2016          Past Medical History:    No past medical history on file.    Past Surgical History:    Past Surgical History:   Procedure Laterality Date     BIOPSY OF SKIN LESION  2013      SECTION  2016    [facility].     ENT SURGERY      adenoidectomy     ENT SURGERY      anesthesia for tooth work     " LAPAROSCOPIC SALPINGECTOMY Left 12/27/2014    Procedure: LAPAROSCOPIC SALPINGECTOMY;  Surgeon: Leonel Bethea MD;  Location: [facility]       Family History:    Family History   Problem Relation Age of Onset     Heart Disease Mother         stent placement     C.A.D. Mother      Diabetes Paternal Grandmother      Hypertension Paternal Grandmother        Social History:  Marital Status:  Single [1]  Social History     Tobacco Use     Smoking status: Current Every Day Smoker     Packs/day: 0.50     Years: 7.00     Pack years: 3.50     Types: Cigarettes     Smokeless tobacco: Never Used   Substance Use Topics     Alcohol use: Yes     Comment: occas     Drug use: No        Medications:    Acetaminophen (TYLENOL PO)  omeprazole (PRILOSEC) 10 MG CR capsule  ondansetron (ZOFRAN) 4 MG tablet          Review of Systems   Constitutional: Positive for activity change. Negative for chills and fever.   HENT: Positive for ear pain (bilateral), sore throat and trouble swallowing.    Respiratory: Negative.    Gastrointestinal: Negative.    Skin: Negative.        Physical Exam   BP: 94/61  Heart Rate: 105  Temp: 98.3  F (36.8  C)  Resp: 16  Weight: 56.7 kg (125 lb)  SpO2: 100 %      Physical Exam  Vitals signs and nursing note reviewed.   Constitutional:       General: She is not in acute distress.     Appearance: She is normal weight.   HENT:      Head: Normocephalic.      Right Ear: Ear canal normal. A middle ear effusion is present.      Left Ear: Ear canal normal. A middle ear effusion is present.      Nose: Mucosal edema present.      Right Turbinates: Swollen.      Left Turbinates: Swollen.      Right Sinus: No maxillary sinus tenderness or frontal sinus tenderness.      Left Sinus: No maxillary sinus tenderness or frontal sinus tenderness.      Mouth/Throat:      Lips: Pink.      Mouth: Mucous membranes are moist.      Pharynx: Uvula midline. Posterior oropharyngeal erythema present. No oropharyngeal exudate.      Tonsils: 2+ on  the right. 2+ on the left.   Eyes:      Conjunctiva/sclera: Conjunctivae normal.   Cardiovascular:      Rate and Rhythm: Regular rhythm. Tachycardia present.      Heart sounds: Normal heart sounds.   Pulmonary:      Effort: Pulmonary effort is normal.      Breath sounds: Normal breath sounds. No wheezing.   Lymphadenopathy:      Cervical: Cervical adenopathy present.      Right cervical: Superficial cervical adenopathy present.      Left cervical: Superficial cervical adenopathy and posterior cervical adenopathy present.   Skin:     General: Skin is warm and dry.   Neurological:      Mental Status: She is alert and oriented to person, place, and time.   Psychiatric:         Behavior: Behavior normal.         ED Course        Procedures                 Results for orders placed or performed during the hospital encounter of 01/25/20 (from the past 24 hour(s))   Rapid strep screen   Result Value Ref Range    Specimen Description Throat     Rapid Strep A Screen       NEGATIVE: No Group A streptococcal antigen detected by immunoassay, await culture report.       Medications - No data to display    Assessments & Plan (with Medical Decision Making)     I have reviewed the nursing notes.    I have reviewed the findings, diagnosis, plan and need for follow up with the patient.  (J02.9) Pharyngitis, acute    Comment: 24 year old female with URI symptoms of sore throat with painful swallowing and bilateral ear pain that started yesterday. She has taken OTC products, yesterday, with minimal relief. Strep screen negative, culture pending.    Plan:  Treat symptoms conservatively with acetaminophen and  ibuprofen (if applicable) for fevers, body aches, and headaches, guaifenesin and/or honey for cough. May use chest rubs for sore throat and congestion, hot and cold liquids may help decrease sore throat and help you feel better. Increase fluids. You may utilize pseudoephedrine for congestion. Return to be reevaluated by ER/UC or  your primary care provider if symptoms worsen, you develop breathing difficulties, or you do not improve in a reasonable time frame. It can take several days for a cough to resolve. It can take ten to fourteen days for upper respiratory symptoms to resolve.   These discharge instructions and medications were reviewed with her and understanding verbalized.            Discharge Medication List as of 1/25/2020 10:40 PM          Final diagnoses:   Pharyngitis, acute       1/25/2020   [facility]      Marquita Turner, CNP  01/25/20 1942

## 2020-01-26 NOTE — ED NOTES
Pharyngitis and bilateral ear pain since yesterday. No fevers. Has not tried any OTC medications at home.

## 2020-01-27 LAB
BACTERIA SPEC CULT: ABNORMAL
BACTERIA SPEC CULT: ABNORMAL
SPECIMEN SOURCE: ABNORMAL

## 2020-01-28 ENCOUNTER — HOSPITAL ENCOUNTER (EMERGENCY)
Facility: HOSPITAL | Age: 25
Discharge: HOME | End: 2020-01-28
Attending: NURSE PRACTITIONER | Admitting: NURSE PRACTITIONER
Payer: COMMERCIAL

## 2020-01-28 VITALS
SYSTOLIC BLOOD PRESSURE: 99 MMHG | WEIGHT: 125 LBS | TEMPERATURE: 97.5 F | RESPIRATION RATE: 16 BRPM | HEIGHT: 62 IN | BODY MASS INDEX: 23 KG/M2 | DIASTOLIC BLOOD PRESSURE: 62 MMHG | OXYGEN SATURATION: 100 %

## 2020-01-28 DIAGNOSIS — J02.0 ACUTE STREPTOCOCCAL PHARYNGITIS: ICD-10-CM

## 2020-01-28 PROCEDURE — G0463 HOSPITAL OUTPT CLINIC VISIT: HCPCS

## 2020-01-28 PROCEDURE — 99213 OFFICE O/P EST LOW 20 MIN: CPT | Mod: Z6 | Performed by: NURSE PRACTITIONER

## 2020-01-28 RX ORDER — AMOXICILLIN 500 MG/1
500 CAPSULE ORAL 2 TIMES DAILY
Qty: 20 CAPSULE | Refills: 0 | Status: SHIPPED | OUTPATIENT
Start: 2020-01-28 | End: 2020-02-07

## 2020-01-28 ASSESSMENT — ENCOUNTER SYMPTOMS
ACTIVITY CHANGE: 1
VOMITING: 0
SHORTNESS OF BREATH: 0
EYES NEGATIVE: 1
COUGH: 0
NAUSEA: 1
FEVER: 1
TROUBLE SWALLOWING: 1
SORE THROAT: 1
CHILLS: 1
HEADACHES: 1

## 2020-01-28 ASSESSMENT — MIFFLIN-ST. JEOR: SCORE: 1260.25

## 2020-01-28 NOTE — ED PROVIDER NOTES
History     Chief Complaint   Patient presents with     Pharyngitis     Otalgia     left ear with fluid in ear      HPI  Gerald Evans is a 24 year old female who was previously evaluated for URI symptoms two days ago. Her symptoms have not improved at this time and include fevers, chills, itching in left ear, sore throat with painful swallowing, nausea, and a headache. Her strep screen was positive for organism other than group A. She has not taken any OTC products today. Denies vomiting, diarrhea, and shortness of breath.      Accompanied per: self  Length of illness: four days       Allergies:  Allergies   Allergen Reactions     Adderall      Amphetamine Aspartate Other (See Comments)     Aggressive  Adderall       Amphetamine Sulfate Other (See Comments)     Aggressive  Adderall     Amphetamine-Dextroamphetamine      Other reaction(s): Other - Describe In Comment Field  Gets violent     Dextroamphetamine Other (See Comments)     Aggressive  Adderall     Strawberry      Buspar [Buspirone] Rash       Problem List:    Patient Active Problem List    Diagnosis Date Noted     Pregnancy related bilateral lower abdominal cramping, antepartum--2018     Priority: Medium     Urinary tract infection in mother during first trimester of pregnancy--Macrobid x 7--2018     Priority: Medium     Encounter for supervision of other normal pregnancy, first trimester 2018     Priority: Medium     Encounter for sterilization 2018     Priority: Medium     Counseled, Sterilization request forms signed 18       [place]'s duct cyst 2018     Priority: Medium     Monitor during pregnacy       Papanicolaou smear of cervix with low grade squamous intraepithelial lesion (LGSIL) 2018     Priority: Medium     Repeat at annual (24)       Dyspareunia in female 2018     Priority: Medium     History of ectopic pregnancy 2018     Priority: Medium     History of   2018     Priority: Medium     History of alcoholism (H) 2018     Priority: Medium     Dysmenorrhea 2018     Priority: Medium     Previous  delivery, antepartum condition or complication 2016     Priority: Medium     Counsled. referral with Dr Kelsea Rodriguez at [facility] for planned  .  Planning vasectomy for BC.  Tubal if CS.   Prior left salpingectomy.  H/o panic d/o and required Gen anesthesia with prior cd.  Desires Gen Anesthesia for CD delivery.   Serial US growth (h/o IUGR), Placental lakes.  Q 4 weeks    Level 2 US for placentation/anantomy:  Placental site: posterior, no previa.  Placental lakes.   Needs sterilization forms signed. Done .   Plan breastfeeding  Declines Tdap  RHIG done  H/o  delivery (twins 31 weeks).   Delivery indication was IUGR with absent diastolic flow and NOT PTL.  LTCS.  Girl       Previous  delivery, antepartum 2016     Priority: Medium     Subchorionic hemorrhage in first trimester 2016     Priority: Medium     Refusing rhogam       Short interval between pregnancies affecting pregnancy in first trimester, antepartum 2016     Priority: Medium     Vaginal bleeding 2016     Priority: Medium     Refusing rhogam despite counseling       Heartburn 2015     Priority: Medium     Encounter for triage in pregnant patient 2015     Priority: Medium     NO SHOW 10/26/2015     Priority: Medium     No showed Dr. Fam 10/16/15; 12/23/15; 16; 16; 16; 16; 8/3/16; 16  Letter sent regarding multiple missed appointments 12/23/15.         Need for immunization against influenza 09/15/2015     Priority: Medium     refuses       Anxiety and depression 2015     Priority: Medium     tools       Mild intermittent asthma 2015     Priority: Medium     Need for Tdap vaccination 2015     Priority: Medium     Refuses despite counseling       Grief reaction 2015  "    Priority: Medium     8/12/15: Mother \"on life-support in [place] with <10% chance survival\"       Loose stools 2015     Priority: Medium     Blood type, Rh negative 2015     Priority: Medium     Rhogam done 12/15/15       Hidradenitis suppurativa 2013     Priority: Medium     Smoker 2013     Priority: Medium     Trying to quit       Panic disorder without agoraphobia 2013     Priority: Medium     Overview:   [facility]       Esophageal reflux 2012     Priority: Medium     Anemia 02/10/2012     Priority: Medium     Overview:   IMO Update 10/11       Migraine with aura 2010     Priority: Medium     Overview:   IMO Update       Exercise-induced asthma 2009     Priority: Medium     Overview:   Mild       Allergic rhinitis due to other allergen 2008     Priority: Medium     Oppositional defiant disorder 2008     Priority: Medium     Overview:   IMO Update 10/11       Constipation 09/10/2007     Priority: Medium     Overview:   IMO Update       Attention deficit hyperactivity disorder (ADHD) 2003     Priority: Medium     Overview:   IMO Update 10 2016          Past Medical History:    History reviewed. No pertinent past medical history.    Past Surgical History:    Past Surgical History:   Procedure Laterality Date     BIOPSY OF SKIN LESION  2013      SECTION  2016    [facility].     ENT SURGERY      adenoidectomy     ENT SURGERY      anesthesia for tooth work     LAPAROSCOPIC SALPINGECTOMY Left 2014    Procedure: LAPAROSCOPIC SALPINGECTOMY;  Surgeon: Leonel Bethea MD;  Location: [facility]       Family History:    Family History   Problem Relation Age of Onset     Heart Disease Mother         stent placement     C.A.D. Mother      Diabetes Paternal Grandmother      Hypertension Paternal Grandmother        Social History:  Marital Status:  Single [1]  Social History     Tobacco Use     Smoking status: Current Every Day Smoker     " "Packs/day: 0.50     Years: 7.00     Pack years: 3.50     Types: Cigarettes     Smokeless tobacco: Never Used   Substance Use Topics     Alcohol use: Yes     Comment: occas     Drug use: No        Medications:    Acetaminophen (TYLENOL PO)  amoxicillin (AMOXIL) 500 MG capsule  amoxicillin (AMOXIL) 500 MG capsule  ondansetron (ZOFRAN) 4 MG tablet  sertraline (ZOLOFT) 50 MG tablet  omeprazole (PRILOSEC) 10 MG CR capsule          Review of Systems   Constitutional: Positive for activity change, chills and fever.   HENT: Positive for ear pain (left ear itching), sore throat and trouble swallowing.    Eyes: Negative.    Respiratory: Negative for cough and shortness of breath.    Gastrointestinal: Positive for nausea. Negative for vomiting.   Skin: Negative.    Neurological: Positive for headaches.       Physical Exam   BP: 99/62  Heart Rate: 82  Temp: 97.5  F (36.4  C)  Resp: 16  Height: 157.5 cm (5' 2\")  Weight: 56.7 kg (125 lb)  SpO2: 100 %      Physical Exam  Vitals signs and nursing note reviewed.   Constitutional:       General: She is not in acute distress.     Appearance: She is normal weight.   HENT:      Head: Normocephalic.      Right Ear: Ear canal and external ear normal. A middle ear effusion is present.      Left Ear: Ear canal and external ear normal. A middle ear effusion is present. Tympanic membrane is erythematous.      Nose: Rhinorrhea present. Rhinorrhea is clear.      Right Sinus: No maxillary sinus tenderness or frontal sinus tenderness.      Left Sinus: No maxillary sinus tenderness or frontal sinus tenderness.      Mouth/Throat:      Lips: Pink.      Mouth: Mucous membranes are moist.      Pharynx: Uvula midline. Posterior oropharyngeal erythema present.      Tonsils: 2+ on the right. 2+ on the left.   Eyes:      Conjunctiva/sclera: Conjunctivae normal.   Neck:      Musculoskeletal: Neck supple.   Cardiovascular:      Rate and Rhythm: Normal rate and regular rhythm.      Heart sounds: Normal heart " sounds. No murmur.   Pulmonary:      Effort: Pulmonary effort is normal.      Breath sounds: Normal breath sounds. No wheezing, rhonchi or rales.   Lymphadenopathy:      Cervical: Cervical adenopathy present.      Right cervical: Superficial cervical adenopathy present.      Left cervical: Superficial cervical adenopathy present.   Skin:     General: Skin is warm and dry.   Neurological:      Mental Status: She is alert and oriented to person, place, and time.   Psychiatric:         Behavior: Behavior normal.         ED Course        Procedures            No results found for this or any previous visit (from the past 24 hour(s)).    Medications - No data to display    Assessments & Plan (with Medical Decision Making)     I have reviewed the nursing notes.    I have reviewed the findings, diagnosis, plan and need for follow up with the patient.  (J02.0) Acute streptococcal pharyngitis  Comment: 24 year old female who was previously evaluated for URI symptoms two days ago. Her symptoms have not improved at this time and include fevers, chills, itching in left ear, sore throat with painful swallowing, nausea, and a headache. Her strep screen was positive for organism other than group A. She has not taken any OTC products today. Denies vomiting, diarrhea, and shortness of breath.  Plan: amoxicillin bid for ten days. Education provided on these medications and on pharyngitis.  Treat symptoms conservatively with acetaminophen and  ibuprofen (if applicable) for fevers, body aches, and headaches, guaifenesin and/or honey for cough. May use chest rubs for sore throat and congestion, hot and cold liquids may help decrease sore throat and help you feel better. Increase fluids. You may utilize pseudoephedrine for congestion. Return to be reevaluated by ER/UC or your primary care provider if symptoms worsen, you develop breathing difficulties, or you do not improve in a reasonable time frame. It can take several days for a cough  to resolve. It can take ten to fourteen days for upper respiratory symptoms to resolve.   These discharge instructions and medications were reviewed with her and understanding verbalized.  Discharge Medication List as of 1/28/2020  9:32 AM      START taking these medications    Details   amoxicillin (AMOXIL) 500 MG capsule Take 1 capsule (500 mg) by mouth 2 times daily for 10 days, Disp-20 capsule, R-0, E-Prescribe             Final diagnoses:   Acute streptococcal pharyngitis       1/28/2020   [facility]       Marquita Turner, CNP  01/30/20 1927

## 2020-01-28 NOTE — ED AVS SNAPSHOT
[facility]  [address] [address]  Phone:  [phone]                                    Gerald Evans   MRN: [id number]    Department:  [facility]   Date of Visit:  1/28/2020           After Visit Summary Signature Page    I have received my discharge instructions, and my questions have been answered. I have discussed any challenges I see with this plan with the nurse or doctor.    ..........................................................................................................................................  Patient/Patient Representative Signature      ..........................................................................................................................................  Patient Representative Print Name and Relationship to Patient    ..................................................               ................................................  Date                                   Time    ..........................................................................................................................................  Reviewed by Signature/Title    ...................................................              ..............................................  Date                                               Time          22EPIC Rev 08/18

## 2020-01-28 NOTE — ED TRIAGE NOTES
"Pt is here sore throat and left ear pain \"hurst and itches\", ongoing since Saturday. Tylenol at midnight last night.   "

## 2020-09-18 ENCOUNTER — OFFICE VISIT (OUTPATIENT)
Dept: UROLOGY | Facility: OTHER | Age: 25
End: 2020-09-18
Attending: UROLOGY
Payer: COMMERCIAL

## 2020-09-18 VITALS
HEIGHT: 62 IN | HEART RATE: 94 BPM | WEIGHT: 125 LBS | SYSTOLIC BLOOD PRESSURE: 98 MMHG | OXYGEN SATURATION: 98 % | BODY MASS INDEX: 23 KG/M2 | DIASTOLIC BLOOD PRESSURE: 65 MMHG | TEMPERATURE: 97.5 F

## 2020-09-18 DIAGNOSIS — N36.8 SKENE'S DUCT CYST: Primary | ICD-10-CM

## 2020-09-18 PROCEDURE — 99213 OFFICE O/P EST LOW 20 MIN: CPT | Performed by: UROLOGY

## 2020-09-18 PROCEDURE — G0463 HOSPITAL OUTPT CLINIC VISIT: HCPCS

## 2020-09-18 ASSESSMENT — PAIN SCALES - GENERAL: PAINLEVEL: NO PAIN (0)

## 2020-09-18 ASSESSMENT — MIFFLIN-ST. JEOR: SCORE: 1255.25

## 2020-09-18 NOTE — LETTER
9/18/2020       RE: Gerald Evans  [address] [facility] [address]     Dear Colleague,    Thank you for referring your patient, Gerald Evans, to the [facility]. Please see a copy of my visit note below.      History     Chief Complaint:    Consult (Lump in vagina)      HPI   Gerald Evans is a 25 year old female who presents with what she describes as a lump in her vaginal area.  Elfego said she noticed this probably in 2018 prior to having her baby in February 2019.  She saw Dr. Kulkarni who diagnosed it as a [place]'s duct cyst she also saw Dr. Sánchez for this problem.  She states that it still there and she cannot really tell if it is getting bigger or smaller but she does think that occasionally it does get bigger or smaller.  Is not particularly painful.  She says she has had some urinary tract infections but I looked through her chart and I am not seeing that there is been a significant problem with positive urine cultures.  She denies any pain with urination or trouble with urinating.    Allergies:    Allergies   Allergen Reactions     Adderall      Amphetamine Aspartate Other (See Comments)     Aggressive  Adderall       Amphetamine Sulfate Other (See Comments)     Aggressive  Adderall     Amphetamine-Dextroamphetamine      Other reaction(s): Other - Describe In Comment Field  Gets violent     Dextroamphetamine Other (See Comments)     Aggressive  Adderall     Strawberry      Buspar [Buspirone] Rash        Medications:      Acetaminophen (TYLENOL PO)  omeprazole (PRILOSEC) 10 MG CR capsule  ondansetron (ZOFRAN) 4 MG tablet  sertraline (ZOLOFT) 50 MG tablet        Problem List:      Patient Active Problem List    Diagnosis Date Noted     Pregnancy related bilateral lower abdominal cramping, antepartum--8/24/2018 08/23/2018     Priority: Medium     Urinary tract infection in mother during first trimester of pregnancy--Macrobid x  7--2018     Priority: Medium     Encounter for supervision of other normal pregnancy, first trimester 2018     Priority: Medium     Encounter for sterilization 2018     Priority: Medium     Counseled, Sterilization request forms signed 18       [place]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 referral with Dr Kelsea Rodriguez at [facility] for planned  .  Planning vasectomy for BC.  Tubal if CS.   Prior left salpingectomy.  H/o panic d/o and required Gen anesthesia with prior cd.  Desires Gen Anesthesia for CD delivery.   Serial US growth (h/o IUGR), Placental lakes.  Q 4 weeks    Level 2 US for placentation/anantomy:  Placental site: posterior, no previa.  Placental lakes.   Needs sterilization forms signed. Done .   Plan breastfeeding  Declines Tdap  RHIG done  H/o  delivery (twins 31 weeks).   Delivery indication was IUGR with absent diastolic flow and NOT PTL.  LTCS.  Girl       Previous  delivery, antepartum 2016     Priority: Medium     Subchorionic hemorrhage in first trimester 2016     Priority: Medium     Refusing rhogam       Short interval between pregnancies affecting pregnancy in first trimester, antepartum 2016     Priority: Medium     Vaginal bleeding 2016     Priority: Medium     Refusing rhogam despite counseling       Heartburn  "12/01/2015     Priority: Medium     Encounter for triage in pregnant patient 11/24/2015     Priority: Medium     NO SHOW 10/26/2015     Priority: Medium     No showed Dr. Fam 10/16/15; 12/23/15; 1/27/16; 2/2/16; 2/24/16; 7/19/16; 8/3/16; 8/24/16  Letter sent regarding multiple missed appointments 12/23/15.         Need for immunization against influenza 09/15/2015     Priority: Medium     refuses       Anxiety and depression 08/17/2015     Priority: Medium     tools       Mild intermittent asthma 08/17/2015     Priority: Medium     Need for Tdap vaccination 08/17/2015     Priority: Medium     Refuses despite counseling       Grief reaction 08/12/2015     Priority: Medium     8/12/15: Mother \"on life-support in [place] with <10% chance survival\"       Loose stools 07/28/2015     Priority: Medium     Blood type, Rh negative 01/07/2015     Priority: Medium     Rhogam done 12/15/15       Hidradenitis suppurativa 09/08/2013     Priority: Medium     Smoker 09/08/2013     Priority: Medium     Trying to quit       Panic disorder without agoraphobia 08/20/2013     Priority: Medium     Overview:   [facility]       Esophageal reflux 03/13/2012     Priority: Medium     Anemia 02/10/2012     Priority: Medium     Overview:   IMO Update 10/11       Migraine with aura 12/27/2010     Priority: Medium     Overview:   IMO Update       Exercise-induced asthma 01/06/2009     Priority: Medium     Overview:   Mild       Allergic rhinitis due to other allergen 05/01/2008     Priority: Medium     Oppositional defiant disorder 02/01/2008     Priority: Medium     Overview:   IMO Update 10/11       Constipation 09/10/2007     Priority: Medium     Overview:   IMO Update       Attention deficit hyperactivity disorder (ADHD) 06/24/2003     Priority: Medium     Overview:   IMO Update 10 2016          Past Medical History:      No past medical history on file.    Past Surgical History:      Past Surgical History:   Procedure Laterality Date     BIOPSY " "OF SKIN LESION  2013      SECTION  2016    [facility].     ENT SURGERY      adenoidectomy     ENT SURGERY      anesthesia for tooth work     LAPAROSCOPIC SALPINGECTOMY Left 2014    Procedure: LAPAROSCOPIC SALPINGECTOMY;  Surgeon: Leonel Bethea MD;  Location: [facility] OR       Family History:      Family History   Problem Relation Age of Onset     Heart Disease Mother         stent placement     C.A.D. Mother      Diabetes Paternal Grandmother      Hypertension Paternal Grandmother        Social History:    Marital Status:  Single [1]  Social History     Tobacco Use     Smoking status: Current Every Day Smoker     Packs/day: 0.50     Years: 7.00     Pack years: 3.50     Types: Cigarettes     Smokeless tobacco: Never Used   Substance Use Topics     Alcohol use: Yes     Comment: occas     Drug use: No        Review of Systems   All other systems reviewed and are negative.        Physical Exam   Vitals:  BP 98/65 (BP Location: Right arm, Patient Position: Sitting, Cuff Size: Adult Regular)   Pulse 94   Temp 97.5  F (36.4  C) (Tympanic)   Ht 1.575 m (5' 2\")   Wt 56.7 kg (125 lb)   SpO2 98%   BMI 22.86 kg/m        Physical Exam  Constitutional:       Appearance: Normal appearance. She is normal weight.   Pulmonary:      Effort: Pulmonary effort is normal.   Abdominal:      General: Abdomen is flat. There is no distension.      Palpations: Abdomen is soft. There is no mass.      Tenderness: There is no abdominal tenderness.      Hernia: No hernia is present.   Genitourinary:         Comments: External genitalia are normal.  She has had some lesions excised for her hidradenitis in the inner groins on the left and the right side.  The urethral meatus is shifted to the left because there is a large bulging massTo the right of the urethral meatus which compresses and everts the distal urethra to the left.  When I compress this there is purulent debris that is coming out of it.  It is not particularly " painful to the patient.  Her urethra not seem to be tender and this is lateral to the urethra so I do not think this is urethral diverticuli it certainly is compressing onto that distal urethra.  Neurological:      Mental Status: She is alert.       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  I do not think so but I want to just get a better feel for how large this is.  Once we get that back then we can discuss further treatment options at that time.      No follow-ups on file.    Elana Oliveros MD  [facility] [place]            Again, thank you for allowing me to participate in the care of your patient.      Sincerely,    Elana Oliveros MD

## 2020-09-18 NOTE — NURSING NOTE
"Chief Complaint   Patient presents with     Consult     Lump in vagina       Initial BP 98/65 (BP Location: Right arm, Patient Position: Sitting, Cuff Size: Adult Regular)   Pulse 94   Temp 97.5  F (36.4  C) (Tympanic)   Ht 1.575 m (5' 2\")   Wt 56.7 kg (125 lb)   SpO2 98%   BMI 22.86 kg/m   Estimated body mass index is 22.86 kg/m  as calculated from the following:    Height as of this encounter: 1.575 m (5' 2\").    Weight as of this encounter: 56.7 kg (125 lb).  Medication Reconciliation: complete  Misti Kingsley MA    Review of Systems:    Weight loss:    No     Recent fever/chills:  No   Night sweats:   No  Current skin rash:  No   Recent hair loss:  No  Heat intolerance:  No   Cold intolerance:  No  Chest pain:   No   Palpitations:   No  Shortness of breath:  No   Wheezing:   No  Constipation:    No   Diarrhea:   No   Nausea:   No   Vomiting:   No   Kidney/side pain:  No   Back pain:   No  Frequent headaches:  No   Dizziness:     No  Leg swelling:   No   Calf pain:    No    Parents, brothers or sisters with history of kidney cancer:   No  Parents, brothers or sisters with history of bladder cancer: No  Misti Kingsley MA    "

## 2020-09-18 NOTE — PROGRESS NOTES
History     Chief Complaint:    Consult (Lump in vagina)      HPI   Gerald Evans is a 25 year old female who presents with what she describes as a lump in her vaginal area.  Elfego said she noticed this probably in 2018 prior to having her baby in February 2019.  She saw Dr. Kulkarni who diagnosed it as a [place]'s duct cyst she also saw Dr. Sánchez for this problem.  She states that it still there and she cannot really tell if it is getting bigger or smaller but she does think that occasionally it does get bigger or smaller.  Is not particularly painful.  She says she has had some urinary tract infections but I looked through her chart and I am not seeing that there is been a significant problem with positive urine cultures.  She denies any pain with urination or trouble with urinating.    Allergies:    Allergies   Allergen Reactions     Adderall      Amphetamine Aspartate Other (See Comments)     Aggressive  Adderall       Amphetamine Sulfate Other (See Comments)     Aggressive  Adderall     Amphetamine-Dextroamphetamine      Other reaction(s): Other - Describe In Comment Field  Gets violent     Dextroamphetamine Other (See Comments)     Aggressive  Adderall     Strawberry      Buspar [Buspirone] Rash        Medications:      Acetaminophen (TYLENOL PO)  omeprazole (PRILOSEC) 10 MG CR capsule  ondansetron (ZOFRAN) 4 MG tablet  sertraline (ZOLOFT) 50 MG tablet        Problem List:      Patient Active Problem List    Diagnosis Date Noted     Pregnancy related bilateral lower abdominal cramping, antepartum--8/24/2018 08/23/2018     Priority: Medium     Urinary tract infection in mother during first trimester of pregnancy--Macrobid x 7--8/24/2018 08/23/2018     Priority: Medium     Encounter for supervision of other normal pregnancy, first trimester 07/26/2018     Priority: Medium     Encounter for sterilization 07/26/2018     Priority: Medium     Counseled, Sterilization request forms signed 11/6/18       [place]'s duct  cyst 2018     Priority: Medium     Monitor during pregnacy       Papanicolaou smear of cervix with low grade squamous intraepithelial lesion (LGSIL) 2018     Priority: Medium     Repeat at annual (24)       Dyspareunia in female 2018     Priority: Medium     History of ectopic pregnancy 2018     Priority: Medium     History of  2018     Priority: Medium     History of alcoholism (H) 2018     Priority: Medium     Dysmenorrhea 2018     Priority: Medium     Previous  delivery, antepartum condition or complication 2016     Priority: Medium     Counsled. referral with Dr Kelsea Rodriguez at [facility] for planned  .  Planning vasectomy for BC.  Tubal if CS.   Prior left salpingectomy.  H/o panic d/o and required Gen anesthesia with prior cd.  Desires Gen Anesthesia for CD delivery.   Serial US growth (h/o IUGR), Placental lakes.  Q 4 weeks    Level 2 US for placentation/anantomy:  Placental site: posterior, no previa.  Placental lakes.   Needs sterilization forms signed. Done .   Plan breastfeeding  Declines Tdap  RHIG done  H/o  delivery (twins 31 weeks).   Delivery indication was IUGR with absent diastolic flow and NOT PTL.  LTCS.  Girl       Previous  delivery, antepartum 2016     Priority: Medium     Subchorionic hemorrhage in first trimester 2016     Priority: Medium     Refusing rhogam       Short interval between pregnancies affecting pregnancy in first trimester, antepartum 2016     Priority: Medium     Vaginal bleeding 2016     Priority: Medium     Refusing rhogam despite counseling       Heartburn 2015     Priority: Medium     Encounter for triage in pregnant patient 2015     Priority: Medium     NO SHOW 10/26/2015     Priority: Medium     No showed Dr. Fam 10/16/15; 12/23/15; 16; 16; 16; 16; 8/3/16; 16  Letter sent regarding multiple missed  "appointments 12/23/15.         Need for immunization against influenza 09/15/2015     Priority: Medium     refuses       Anxiety and depression 2015     Priority: Medium     tools       Mild intermittent asthma 2015     Priority: Medium     Need for Tdap vaccination 2015     Priority: Medium     Refuses despite counseling       Grief reaction 2015     Priority: Medium     8/12/15: Mother \"on life-support in [place] with <10% chance survival\"       Loose stools 2015     Priority: Medium     Blood type, Rh negative 2015     Priority: Medium     Rhogam done 12/15/15       Hidradenitis suppurativa 2013     Priority: Medium     Smoker 2013     Priority: Medium     Trying to quit       Panic disorder without agoraphobia 2013     Priority: Medium     Overview:   [facility]       Esophageal reflux 2012     Priority: Medium     Anemia 02/10/2012     Priority: Medium     Overview:   IMO Update 10/11       Migraine with aura 2010     Priority: Medium     Overview:   IMO Update       Exercise-induced asthma 2009     Priority: Medium     Overview:   Mild       Allergic rhinitis due to other allergen 2008     Priority: Medium     Oppositional defiant disorder 2008     Priority: Medium     Overview:   IMO Update 10/11       Constipation 09/10/2007     Priority: Medium     Overview:   IMO Update       Attention deficit hyperactivity disorder (ADHD) 2003     Priority: Medium     Overview:   IMO Update 10 2016          Past Medical History:      No past medical history on file.    Past Surgical History:      Past Surgical History:   Procedure Laterality Date     BIOPSY OF SKIN LESION  2013      SECTION  2016    [facility].     ENT SURGERY      adenoidectomy     ENT SURGERY      anesthesia for tooth work     LAPAROSCOPIC SALPINGECTOMY Left 2014    Procedure: LAPAROSCOPIC SALPINGECTOMY;  Surgeon: Leonel Bethea MD;  Location: [facility] " "OR       Family History:      Family History   Problem Relation Age of Onset     Heart Disease Mother         stent placement     C.A.D. Mother      Diabetes Paternal Grandmother      Hypertension Paternal Grandmother        Social History:    Marital Status:  Single [1]  Social History     Tobacco Use     Smoking status: Current Every Day Smoker     Packs/day: 0.50     Years: 7.00     Pack years: 3.50     Types: Cigarettes     Smokeless tobacco: Never Used   Substance Use Topics     Alcohol use: Yes     Comment: occas     Drug use: No        Review of Systems   All other systems reviewed and are negative.        Physical Exam   Vitals:  BP 98/65 (BP Location: Right arm, Patient Position: Sitting, Cuff Size: Adult Regular)   Pulse 94   Temp 97.5  F (36.4  C) (Tympanic)   Ht 1.575 m (5' 2\")   Wt 56.7 kg (125 lb)   SpO2 98%   BMI 22.86 kg/m        Physical Exam  Constitutional:       Appearance: Normal appearance. She is normal weight.   Pulmonary:      Effort: Pulmonary effort is normal.   Abdominal:      General: Abdomen is flat. There is no distension.      Palpations: Abdomen is soft. There is no mass.      Tenderness: There is no abdominal tenderness.      Hernia: No hernia is present.   Genitourinary:         Comments: External genitalia are normal.  She has had some lesions excised for her hidradenitis in the inner groins on the left and the right side.  The urethral meatus is shifted to the left because there is a large bulging massTo the right of the urethral meatus which compresses and everts the distal urethra to the left.  When I compress this there is purulent debris that is coming out of it.  It is not particularly painful to the patient.  Her urethra not seem to be tender and this is lateral to the urethra so I do not think this is urethral diverticuli it certainly is compressing onto that distal urethra.  Neurological:      Mental Status: She is alert.       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  I do not think so but I want to just get a better feel for how large this is.  Once we get that back then we can discuss further treatment options at that time.      No follow-ups on file.    Elana Oliveros MD  [facility]

## 2021-03-21 ENCOUNTER — HOSPITAL ENCOUNTER (EMERGENCY)
Facility: HOSPITAL | Age: 26
Discharge: HOME | End: 2021-03-21
Attending: EMERGENCY MEDICINE | Admitting: EMERGENCY MEDICINE
Payer: COMMERCIAL

## 2021-03-21 ENCOUNTER — APPOINTMENT (OUTPATIENT)
Dept: GENERAL RADIOLOGY | Facility: HOSPITAL | Age: 26
End: 2021-03-21
Attending: EMERGENCY MEDICINE
Payer: COMMERCIAL

## 2021-03-21 VITALS
RESPIRATION RATE: 16 BRPM | TEMPERATURE: 97.4 F | DIASTOLIC BLOOD PRESSURE: 67 MMHG | OXYGEN SATURATION: 96 % | HEART RATE: 90 BPM | SYSTOLIC BLOOD PRESSURE: 94 MMHG

## 2021-03-21 DIAGNOSIS — S60.051A CONTUSION OF RIGHT LITTLE FINGER WITHOUT DAMAGE TO NAIL, INITIAL ENCOUNTER: ICD-10-CM

## 2021-03-21 DIAGNOSIS — N89.8 VAGINAL DISCHARGE: ICD-10-CM

## 2021-03-21 LAB
ALBUMIN UR-MCNC: NEGATIVE MG/DL
APPEARANCE UR: CLEAR
BACTERIA #/AREA URNS HPF: ABNORMAL /HPF
BILIRUB UR QL STRIP: NEGATIVE
COLOR UR AUTO: ABNORMAL
GLUCOSE UR STRIP-MCNC: NEGATIVE MG/DL
HCG UR QL: NEGATIVE
HGB UR QL STRIP: NEGATIVE
KETONES UR STRIP-MCNC: NEGATIVE MG/DL
LEUKOCYTE ESTERASE UR QL STRIP: NEGATIVE
NITRATE UR QL: NEGATIVE
PH UR STRIP: 7 PH (ref 4.7–8)
RBC #/AREA URNS AUTO: <1 /HPF (ref 0–2)
SOURCE: ABNORMAL
SP GR UR STRIP: 1 (ref 1–1.03)
SPECIMEN SOURCE: NORMAL
SQUAMOUS #/AREA URNS AUTO: 0 /HPF (ref 0–1)
UROBILINOGEN UR STRIP-MCNC: NORMAL MG/DL (ref 0–2)
WBC #/AREA URNS AUTO: <1 /HPF (ref 0–5)
WET PREP SPEC: NORMAL

## 2021-03-21 PROCEDURE — 81001 URINALYSIS AUTO W/SCOPE: CPT | Performed by: EMERGENCY MEDICINE

## 2021-03-21 PROCEDURE — 87210 SMEAR WET MOUNT SALINE/INK: CPT | Performed by: EMERGENCY MEDICINE

## 2021-03-21 PROCEDURE — 87491 CHLMYD TRACH DNA AMP PROBE: CPT | Performed by: EMERGENCY MEDICINE

## 2021-03-21 PROCEDURE — 81025 URINE PREGNANCY TEST: CPT | Performed by: EMERGENCY MEDICINE

## 2021-03-21 PROCEDURE — 99284 EMERGENCY DEPT VISIT MOD MDM: CPT | Performed by: EMERGENCY MEDICINE

## 2021-03-21 PROCEDURE — 87591 N.GONORRHOEAE DNA AMP PROB: CPT | Performed by: EMERGENCY MEDICINE

## 2021-03-21 PROCEDURE — 99284 EMERGENCY DEPT VISIT MOD MDM: CPT

## 2021-03-21 PROCEDURE — 73140 X-RAY EXAM OF FINGER(S): CPT | Mod: RT

## 2021-03-21 RX ORDER — LAMOTRIGINE 200 MG/1
200 TABLET ORAL DAILY
COMMUNITY
Start: 2020-08-24

## 2021-03-21 RX ORDER — LORAZEPAM 0.5 MG/1
TABLET ORAL
COMMUNITY
Start: 2020-05-07

## 2021-03-21 ASSESSMENT — ENCOUNTER SYMPTOMS
ALLERGIC/IMMUNOLOGIC NEGATIVE: 1
SLEEP DISTURBANCE: 0
FREQUENCY: 0
PHOTOPHOBIA: 0
NECK PAIN: 0
MYALGIAS: 0
RESPIRATORY NEGATIVE: 1
NERVOUS/ANXIOUS: 0
EYES NEGATIVE: 1
PSYCHIATRIC NEGATIVE: 1
MUSCULOSKELETAL NEGATIVE: 1
ENDOCRINE NEGATIVE: 1
CARDIOVASCULAR NEGATIVE: 1
NECK STIFFNESS: 0
HEMATOLOGIC/LYMPHATIC NEGATIVE: 1
CONSTITUTIONAL NEGATIVE: 1
NEUROLOGICAL NEGATIVE: 1

## 2021-03-22 ENCOUNTER — TELEPHONE (OUTPATIENT)
Dept: EMERGENCY MEDICINE | Facility: HOSPITAL | Age: 26
End: 2021-03-22

## 2021-03-22 LAB
C TRACH DNA SPEC QL NAA+PROBE: NOT DETECTED
N GONORRHOEA DNA SPEC QL NAA+PROBE: NOT DETECTED
SPECIMEN SOURCE: NORMAL

## 2021-03-22 NOTE — ED PROVIDER NOTES
History     Chief Complaint   Patient presents with     Hand Pain     Vaginal Discharge     HPI  Gerald Evans is a 25 year old female who presents today with complaints of hand pain and vaginal discharge.  Symptoms began on Friday when patient was playing with her child.  Patient states that her finger bent backwards.  Patient did not seek care immediately till now.  Patient also complained of a vaginal discharge.  Denies any dysuria or hematuria or vaginal bleeding.  Patient has otherwise been in her usual state of health.    Allergies:  Allergies   Allergen Reactions     Adderall      Amphetamine Aspartate Other (See Comments)     Aggressive  Adderall       Amphetamine Sulfate Other (See Comments)     Aggressive  Adderall     Amphetamine-Dextroamphetamine      Other reaction(s): Other - Describe In Comment Field  Gets violent     Dextroamphetamine Other (See Comments)     Aggressive  Adderall     Strawberry      Buspar [Buspirone] Rash       Problem List:    Patient Active Problem List    Diagnosis Date Noted     Pregnancy related bilateral lower abdominal cramping, antepartum--2018     Priority: Medium     Urinary tract infection in mother during first trimester of pregnancy--Macrobid x 7--2018     Priority: Medium     Encounter for supervision of other normal pregnancy, first trimester 2018     Priority: Medium     Encounter for sterilization 2018     Priority: Medium     Counseled, Sterilization request forms signed 18       [place]'s duct cyst 2018     Priority: Medium     Monitor during pregnacy       Papanicolaou smear of cervix with low grade squamous intraepithelial lesion (LGSIL) 2018     Priority: Medium     Repeat at annual (24)       Dyspareunia in female 2018     Priority: Medium     History of ectopic pregnancy 2018     Priority: Medium     History of  2018     Priority: Medium     History of alcoholism (H)  "2018     Priority: Medium     Dysmenorrhea 2018     Priority: Medium     Previous  delivery, antepartum condition or complication 2016     Priority: Medium     Counsled. referral with Dr Kelsea Rodriguez at [facility] for planned  .  Planning vasectomy for BC.  Tubal if CS.   Prior left salpingectomy.  H/o panic d/o and required Gen anesthesia with prior cd.  Desires Gen Anesthesia for CD delivery.   Serial US growth (h/o IUGR), Placental lakes.  Q 4 weeks    Level 2 US for placentation/anantomy:  Placental site: posterior, no previa.  Placental lakes.   Needs sterilization forms signed. Done .   Plan breastfeeding  Declines Tdap  RHIG done  H/o  delivery (twins 31 weeks).   Delivery indication was IUGR with absent diastolic flow and NOT PTL.  LTCS.  Girl       Previous  delivery, antepartum 2016     Priority: Medium     Subchorionic hemorrhage in first trimester 2016     Priority: Medium     Refusing rhogam       Short interval between pregnancies affecting pregnancy in first trimester, antepartum 2016     Priority: Medium     Vaginal bleeding 2016     Priority: Medium     Refusing rhogam despite counseling       Heartburn 2015     Priority: Medium     Encounter for triage in pregnant patient 2015     Priority: Medium     NO SHOW 10/26/2015     Priority: Medium     No showed Dr. Fam 10/16/15; 12/23/15; 16; 16; 16; 16; 8/3/16; 16  Letter sent regarding multiple missed appointments 12/23/15.         Need for immunization against influenza 09/15/2015     Priority: Medium     refuses       Anxiety and depression 2015     Priority: Medium     tools       Mild intermittent asthma 2015     Priority: Medium     Need for Tdap vaccination 2015     Priority: Medium     Refuses despite counseling       Grief reaction 2015     Priority: Medium     8/12/15: Mother \"on life-support in " "[place] with <10% chance survival\"       Loose stools 2015     Priority: Medium     Blood type, Rh negative 2015     Priority: Medium     Rhogam done 12/15/15       Hidradenitis suppurativa 2013     Priority: Medium     Smoker 2013     Priority: Medium     Trying to quit       Panic disorder without agoraphobia 2013     Priority: Medium     Overview:   [facility]       Esophageal reflux 2012     Priority: Medium     Anemia 02/10/2012     Priority: Medium     Overview:   IMO Update 10/11       Migraine with aura 2010     Priority: Medium     Overview:   IMO Update       Exercise-induced asthma 2009     Priority: Medium     Overview:   Mild       Allergic rhinitis due to other allergen 2008     Priority: Medium     Oppositional defiant disorder 2008     Priority: Medium     Overview:   IMO Update 10/11       Constipation 09/10/2007     Priority: Medium     Overview:   IMO Update       Attention deficit hyperactivity disorder (ADHD) 2003     Priority: Medium     Overview:   IMO Update 10 2016          Past Medical History:    No past medical history on file.    Past Surgical History:    Past Surgical History:   Procedure Laterality Date     BIOPSY OF SKIN LESION  2013      SECTION  2016    [facility].     ENT SURGERY      adenoidectomy     ENT SURGERY      anesthesia for tooth work     LAPAROSCOPIC SALPINGECTOMY Left 2014    Procedure: LAPAROSCOPIC SALPINGECTOMY;  Surgeon: Leonel Bethea MD;  Location: [facility] OR       Family History:    Family History   Problem Relation Age of Onset     Heart Disease Mother         stent placement     C.A.D. Mother      Diabetes Paternal Grandmother      Hypertension Paternal Grandmother        Social History:  Marital Status:  Single [1]  Social History     Tobacco Use     Smoking status: Current Every Day Smoker     Packs/day: 0.50     Years: 7.00     Pack years: 3.50     Types: Cigarettes     Smokeless " tobacco: Never Used   Substance Use Topics     Alcohol use: Yes     Comment: occas     Drug use: No        Medications:    Acetaminophen (TYLENOL PO)  lamoTRIgine (LAMICTAL) 100 MG tablet  LORazepam (ATIVAN) 0.5 MG tablet  omeprazole (PRILOSEC) 10 MG CR capsule          Review of Systems   Constitutional: Negative.    HENT: Negative.    Eyes: Negative.  Negative for photophobia.   Respiratory: Negative.    Cardiovascular: Negative.    Endocrine: Negative.    Genitourinary: Positive for urgency and vaginal discharge. Negative for frequency.   Musculoskeletal: Negative.  Negative for myalgias, neck pain and neck stiffness.   Skin: Negative.    Allergic/Immunologic: Negative.    Neurological: Negative.    Hematological: Negative.    Psychiatric/Behavioral: Negative.  Negative for self-injury, sleep disturbance and suicidal ideas. The patient is not nervous/anxious.        Physical Exam   BP: 94/67  Pulse: 90  Temp: 97.4  F (36.3  C)  Resp: 16  SpO2: 96 %      Physical Exam  Constitutional:       General: She is not in acute distress.     Appearance: Normal appearance. She is normal weight. She is not toxic-appearing.   HENT:      Head: Normocephalic and atraumatic.   Pulmonary:      Effort: Pulmonary effort is normal.      Breath sounds: Normal breath sounds.   Abdominal:      General: Abdomen is flat.      Palpations: Abdomen is soft.   Genitourinary:     Comments: Patient declined/refused  Musculoskeletal: Normal range of motion.   Skin:     General: Skin is warm.      Capillary Refill: Capillary refill takes less than 2 seconds.   Neurological:      General: No focal deficit present.      Mental Status: She is alert.   Psychiatric:         Mood and Affect: Mood normal.         Behavior: Behavior normal.         Thought Content: Thought content normal.         Judgment: Judgment normal.         ED Course        Procedures    Finger x-rays - negative fx or dislocation       Results for orders placed or performed  during the hospital encounter of 03/21/21 (from the past 24 hour(s))   UA with Microscopic   Result Value Ref Range    Color Urine Straw     Appearance Urine Clear     Glucose Urine Negative NEG^Negative mg/dL    Bilirubin Urine Negative NEG^Negative    Ketones Urine Negative NEG^Negative mg/dL    Specific Gravity Urine 1.005 1.003 - 1.035    Blood Urine Negative NEG^Negative    pH Urine 7.0 4.7 - 8.0 pH    Protein Albumin Urine Negative NEG^Negative mg/dL    Urobilinogen mg/dL Normal 0.0 - 2.0 mg/dL    Nitrite Urine Negative NEG^Negative    Leukocyte Esterase Urine Negative NEG^Negative    Source Midstream Urine     WBC Urine <1 0 - 5 /HPF    RBC Urine <1 0 - 2 /HPF    Bacteria Urine None (A) NEG^Negative /HPF    Squamous Epithelial /HPF Urine 0 0 - 1 /HPF   GC/Chlamydia by PCR - [facility],    Specimen: Urine   Result Value Ref Range    Specimen Source Midstream Urine     Neisseria gonorrhoreae PCR Not Detected NDET^Not Detected    Chlamydia Trachomatis PCR Not Detected NDET^Not Detected   HCG qualitative urine   Result Value Ref Range    HCG Qual Urine Negative NEG^Negative   Wet prep    Specimen: Vagina   Result Value Ref Range    Specimen Description Vagina     Wet Prep Rare  WBC'S seen       Wet Prep No Trichomonas seen     Wet Prep No clue cells seen     Wet Prep No yeast seen        Medications - No data to display    Assessments & Plan (with Medical Decision Making)     25-year-old female who presents today with complaints of right finger pain.  Patient had x-rays there were negative.  Also complained of vaginal discharge.  Refused pelvic exam and aware and comprehends the risk including missed diagnosis and  worsening of condition. Self collected vaginal sample showed no evidence of trichomonas yeast or clue cells.  UA negative.  Patient to be discharged home.  Given a splint for protection.  Patient understands to follow-up with her doctor this week and return at any time for full pelvic exam.    Due to the  nature of this electronic medical record, laboratory results, imaging results, diagnosis, other information and medications reported above may not represent information available to me at the the time of my care and disposition. Medications reported above may have not been ordered by me.     Portions of the record may have been created with voice recognition software. Occasional wrong-word or 'sound-a- like' substitution may have occurred due to the inherent limitations of voice recognition software. Though the chart has been reviewed, there may be inadvertent transcription errors. Read the chart carefully and recognize, using context, where substitutions have occurred.       New Prescriptions    No medications on file       Final diagnoses:   Contusion of right little finger without damage to nail, initial encounter   Vaginal discharge       3/21/2021   [facility]     Merced Wright MD  03/22/21 2116

## 2021-03-22 NOTE — ED TRIAGE NOTES
R pinky finger bent back on Friday, having tingling pain to finger still. Pt also endorsing vaginal itching and discharge and wants to check for STD, STI or yeast infection.

## 2021-03-22 NOTE — ED NOTES
Pt called stated physician in the ER Dr nguyen had called regarding xray results from yesterday visit 3/21/21  The MD stated to tell the pt that there is a fracture and she should remain wearing her splint and follow up with here PCP ASAP. Pt verbalized understanding and asked if she could have images sent to her PCP at [facility].   Xray was called to have images pushed to [facility]  Pt agreed to make carson with PCP today to get in ASAP.   Pt had no further questions     Linda Torres LPN on 3/22/2021 at 10:52 AM

## 2021-03-22 NOTE — DISCHARGE INSTRUCTIONS
1) You may return at anytime to continue your care  2) Follow up with your doctor in 12 to 24 hours for a recheck.   3) Do not wear your splint for more than 3 days unless told to do so by a doctor.

## 2021-03-22 NOTE — ED PROVIDER NOTES
Telephone encounter:  Called patient and left a voice message on her phone to call back the  Hospital to be updated on the results of the x-ray showing middle phalanx fracture.  This report was given to me by Dr. Walls on the phone at 08:35 AM.  Fredy Oneil MD on 3/22/2021 at 9:02 AM      Fredy Oneil MD  03/22/21 0902

## 2021-04-03 ENCOUNTER — HEALTH MAINTENANCE LETTER (OUTPATIENT)
Age: 26
End: 2021-04-03

## 2021-07-01 ENCOUNTER — HOSPITAL ENCOUNTER (EMERGENCY)
Facility: HOSPITAL | Age: 26
Discharge: HOME | End: 2021-07-01
Attending: INTERNAL MEDICINE | Admitting: INTERNAL MEDICINE
Payer: COMMERCIAL

## 2021-07-01 VITALS
TEMPERATURE: 97.5 F | SYSTOLIC BLOOD PRESSURE: 112 MMHG | RESPIRATION RATE: 18 BRPM | HEART RATE: 70 BPM | DIASTOLIC BLOOD PRESSURE: 75 MMHG | OXYGEN SATURATION: 98 %

## 2021-07-01 DIAGNOSIS — J01.20 ACUTE ETHMOIDAL SINUSITIS, RECURRENCE NOT SPECIFIED: ICD-10-CM

## 2021-07-01 PROCEDURE — 99283 EMERGENCY DEPT VISIT LOW MDM: CPT

## 2021-07-01 PROCEDURE — 99283 EMERGENCY DEPT VISIT LOW MDM: CPT | Performed by: INTERNAL MEDICINE

## 2021-07-01 PROCEDURE — 250N000013 HC RX MED GY IP 250 OP 250 PS 637: Performed by: INTERNAL MEDICINE

## 2021-07-01 RX ORDER — AZITHROMYCIN 250 MG/1
500 TABLET, FILM COATED ORAL ONCE
Status: COMPLETED | OUTPATIENT
Start: 2021-07-01 | End: 2021-07-01

## 2021-07-01 RX ORDER — AZITHROMYCIN 250 MG/1
TABLET, FILM COATED ORAL
Qty: 6 TABLET | Refills: 0 | Status: SHIPPED | OUTPATIENT
Start: 2021-07-01 | End: 2021-07-06

## 2021-07-01 RX ADMIN — AZITHROMYCIN 500 MG: 250 TABLET, FILM COATED ORAL at 23:25

## 2021-07-01 ASSESSMENT — ENCOUNTER SYMPTOMS
SHORTNESS OF BREATH: 0
COLOR CHANGE: 0
SINUS PAIN: 1
FEVER: 0
SINUS PRESSURE: 1
TROUBLE SWALLOWING: 0
DIFFICULTY URINATING: 0
NECK STIFFNESS: 0
FACIAL ASYMMETRY: 0
VOICE CHANGE: 0
EYE REDNESS: 0
ARTHRALGIAS: 0
ABDOMINAL PAIN: 0
HEADACHES: 1
CONFUSION: 0

## 2021-07-02 NOTE — ED PROVIDER NOTES
History     Chief Complaint   Patient presents with     Facial Pain     for 1 week     The history is provided by the patient.   Sinusitis  Pain details:     Location:  Maxillary    Quality:  Aching and pressure  Progression:  Worsening  Chronicity:  Recurrent  Associated symptoms: headaches    Associated symptoms: no chest pain, no congestion, no fever and no shortness of breath        Allergies:  Allergies   Allergen Reactions     Adderall      Amphetamine Aspartate Other (See Comments)     Aggressive  Adderall       Amphetamine Sulfate Other (See Comments)     Aggressive  Adderall     Amphetamine-Dextroamphetamine      Other reaction(s): Other - Describe In Comment Field  Gets violent     Dextroamphetamine Other (See Comments)     Aggressive  Adderall     Strawberry      Buspar [Buspirone] Rash       Problem List:    Patient Active Problem List    Diagnosis Date Noted     Pregnancy related bilateral lower abdominal cramping, antepartum--2018     Priority: Medium     Urinary tract infection in mother during first trimester of pregnancy--Macrobid x 7--2018     Priority: Medium     Encounter for supervision of other normal pregnancy, first trimester 2018     Priority: Medium     Encounter for sterilization 2018     Priority: Medium     Counseled, Sterilization request forms signed 18       [place]'s duct cyst 2018     Priority: Medium     Monitor during pregnacy       Papanicolaou smear of cervix with low grade squamous intraepithelial lesion (LGSIL) 2018     Priority: Medium     Repeat at annual (24)       Dyspareunia in female 2018     Priority: Medium     History of ectopic pregnancy 2018     Priority: Medium     History of  2018     Priority: Medium     History of alcoholism (H) 2018     Priority: Medium     Dysmenorrhea 2018     Priority: Medium     Previous  delivery, antepartum condition or complication  "2016     Priority: Medium     Counsled. referral with Dr Kelsea Rodriguez at [facility] for planned  .  Planning vasectomy for BC.  Tubal if CS.   Prior left salpingectomy.  H/o panic d/o and required Gen anesthesia with prior cd.  Desires Gen Anesthesia for CD delivery.   Serial US growth (h/o IUGR), Placental lakes.  Q 4 weeks    Level 2 US for placentation/anantomy:  Placental site: posterior, no previa.  Placental lakes.   Needs sterilization forms signed. Done .   Plan breastfeeding  Declines Tdap  RHIG done  H/o  delivery (twins 31 weeks).   Delivery indication was IUGR with absent diastolic flow and NOT PTL.  LTCS.  Girl       Previous  delivery, antepartum 2016     Priority: Medium     Subchorionic hemorrhage in first trimester 2016     Priority: Medium     Refusing rhogam       Short interval between pregnancies affecting pregnancy in first trimester, antepartum 2016     Priority: Medium     Vaginal bleeding 2016     Priority: Medium     Refusing rhogam despite counseling       Heartburn 2015     Priority: Medium     Encounter for triage in pregnant patient 2015     Priority: Medium     NO SHOW 10/26/2015     Priority: Medium     No showed Dr. Fam 10/16/15; 12/23/15; 16; 16; 16; 16; 8/3/16; 16  Letter sent regarding multiple missed appointments 12/23/15.         Need for immunization against influenza 09/15/2015     Priority: Medium     refuses       Anxiety and depression 2015     Priority: Medium     tools       Mild intermittent asthma 2015     Priority: Medium     Need for Tdap vaccination 2015     Priority: Medium     Refuses despite counseling       Grief reaction 2015     Priority: Medium     8/12/15: Mother \"on life-support in [place] with <10% chance survival\"       Loose stools 2015     Priority: Medium     Blood type, Rh negative 2015     Priority: Medium     " Rhogam done 12/15/15       Hidradenitis suppurativa 2013     Priority: Medium     Smoker 2013     Priority: Medium     Trying to quit       Panic disorder without agoraphobia 2013     Priority: Medium     Overview:   [facility]       Esophageal reflux 2012     Priority: Medium     Anemia 02/10/2012     Priority: Medium     Overview:   IMO Update 10/11       Migraine with aura 2010     Priority: Medium     Overview:   IMO Update       Exercise-induced asthma 2009     Priority: Medium     Overview:   Mild       Allergic rhinitis due to other allergen 2008     Priority: Medium     Oppositional defiant disorder 2008     Priority: Medium     Overview:   IMO Update 10/11       Constipation 09/10/2007     Priority: Medium     Overview:   IMO Update       Attention deficit hyperactivity disorder (ADHD) 2003     Priority: Medium     Overview:   IMO Update 10 2016          Past Medical History:    No past medical history on file.    Past Surgical History:    Past Surgical History:   Procedure Laterality Date     BIOPSY OF SKIN LESION        SECTION  2016    .     ENT SURGERY      adenoidectomy     ENT SURGERY      anesthesia for tooth work     LAPAROSCOPIC SALPINGECTOMY Left 2014    Procedure: LAPAROSCOPIC SALPINGECTOMY;  Surgeon: Leonel Bethea MD;  Location: [facility] OR       Family History:    Family History   Problem Relation Age of Onset     Heart Disease Mother         stent placement     C.A.D. Mother      Diabetes Paternal Grandmother      Hypertension Paternal Grandmother        Social History:  Marital Status:  Single [1]  Social History     Tobacco Use     Smoking status: Current Every Day Smoker     Packs/day: 0.50     Years: 7.00     Pack years: 3.50     Types: Cigarettes     Smokeless tobacco: Never Used   Substance Use Topics     Alcohol use: Yes     Comment: occas     Drug use: No        Medications:    azithromycin (ZITHROMAX  Z-TOMMY) 250 MG tablet  Acetaminophen (TYLENOL PO)  lamoTRIgine (LAMICTAL) 100 MG tablet  LORazepam (ATIVAN) 0.5 MG tablet  omeprazole (PRILOSEC) 10 MG CR capsule          Review of Systems   Constitutional: Negative for fever.   HENT: Positive for postnasal drip, sinus pressure and sinus pain. Negative for congestion, trouble swallowing and voice change.    Eyes: Negative for redness.   Respiratory: Negative for shortness of breath.    Cardiovascular: Negative for chest pain.   Gastrointestinal: Negative for abdominal pain.   Genitourinary: Negative for difficulty urinating.   Musculoskeletal: Negative for arthralgias and neck stiffness.   Skin: Negative for color change.   Neurological: Positive for headaches. Negative for facial asymmetry.   Psychiatric/Behavioral: Negative for confusion.       Physical Exam   BP: 112/75  Pulse: 70  Temp: 97.5  F (36.4  C)  Resp: 18  SpO2: 98 %      Physical Exam  Constitutional:       General: She is not in acute distress.     Appearance: She is not diaphoretic.   HENT:      Head: Atraumatic.        Comments: Tenderness on Right etmoid sinus     Mouth/Throat:      Pharynx: No oropharyngeal exudate.   Eyes:      General: No scleral icterus.     Pupils: Pupils are equal, round, and reactive to light.   Cardiovascular:      Heart sounds: Normal heart sounds.   Pulmonary:      Effort: No respiratory distress.      Breath sounds: Normal breath sounds.   Abdominal:      General: Bowel sounds are normal.      Palpations: Abdomen is soft.      Tenderness: There is no abdominal tenderness.   Musculoskeletal:         General: No tenderness.   Skin:     General: Skin is warm.      Findings: No rash.         ED Course        Procedures                 No results found for this or any previous visit (from the past 24 hour(s)).    Medications   azithromycin (ZITHROMAX) tablet 500 mg (has no administration in time range)       Assessments & Plan (with Medical Decision Making)   Acute sinusisitis  + headache  zithro started  NSAIDs as needed for pain control  Follow-up with PCP  I have reviewed the nursing notes.    I have reviewed the findings, diagnosis, plan and need for follow up with the patient.      New Prescriptions    AZITHROMYCIN (ZITHROMAX Z-TOMMY) 250 MG TABLET    Two tablets on the first day, then one tablet daily for the next 4 days       Final diagnoses:   Acute ethmoidal sinusitis, recurrence not specified       7/1/2021   [facility]     Aj Mendes MD  07/01/21 6803

## 2021-07-12 ENCOUNTER — TRANSFERRED RECORDS (OUTPATIENT)
Dept: HEALTH INFORMATION MANAGEMENT | Facility: CLINIC | Age: 26
End: 2021-07-12

## 2021-07-27 ENCOUNTER — OFFICE VISIT (OUTPATIENT)
Dept: OBGYN | Facility: OTHER | Age: 26
End: 2021-07-27
Attending: OBSTETRICS & GYNECOLOGY
Payer: COMMERCIAL

## 2021-07-27 VITALS
SYSTOLIC BLOOD PRESSURE: 108 MMHG | HEART RATE: 76 BPM | OXYGEN SATURATION: 99 % | HEIGHT: 62 IN | DIASTOLIC BLOOD PRESSURE: 62 MMHG | BODY MASS INDEX: 19.51 KG/M2 | WEIGHT: 106 LBS

## 2021-07-27 DIAGNOSIS — N94.10 DYSPAREUNIA IN FEMALE: ICD-10-CM

## 2021-07-27 DIAGNOSIS — N92.0 MENORRHAGIA WITH REGULAR CYCLE: ICD-10-CM

## 2021-07-27 DIAGNOSIS — N94.6 DYSMENORRHEA: ICD-10-CM

## 2021-07-27 DIAGNOSIS — Z12.4 SCREENING FOR CERVICAL CANCER: Primary | ICD-10-CM

## 2021-07-27 LAB
CLUE CELLS: ABNORMAL
TRICHOMONAS, WET PREP: ABNORMAL
WBC'S/HIGH POWER FIELD, WET PREP: ABNORMAL
YEAST, WET PREP: ABNORMAL

## 2021-07-27 PROCEDURE — G0463 HOSPITAL OUTPT CLINIC VISIT: HCPCS

## 2021-07-27 PROCEDURE — 99215 OFFICE O/P EST HI 40 MIN: CPT | Performed by: OBSTETRICS & GYNECOLOGY

## 2021-07-27 PROCEDURE — 87210 SMEAR WET MOUNT SALINE/INK: CPT | Mod: ZL | Performed by: OBSTETRICS & GYNECOLOGY

## 2021-07-27 PROCEDURE — 87491 CHLMYD TRACH DNA AMP PROBE: CPT | Mod: ZL | Performed by: OBSTETRICS & GYNECOLOGY

## 2021-07-27 PROCEDURE — G0463 HOSPITAL OUTPT CLINIC VISIT: HCPCS | Mod: 25

## 2021-07-27 PROCEDURE — G0145 SCR C/V CYTO,THINLAYER,RESCR: HCPCS | Mod: ZL | Performed by: OBSTETRICS & GYNECOLOGY

## 2021-07-27 RX ORDER — ONDANSETRON 4 MG/1
4 TABLET, FILM COATED ORAL EVERY 8 HOURS PRN
COMMUNITY

## 2021-07-27 ASSESSMENT — PAIN SCALES - GENERAL: PAINLEVEL: NO PAIN (0)

## 2021-07-27 ASSESSMENT — MIFFLIN-ST. JEOR: SCORE: 1164.06

## 2021-07-27 NOTE — NURSING NOTE
"Chief Complaint   Patient presents with     Consult     Consult/ Raul/ dysmenorrhea       Initial /62 (BP Location: Left arm, Cuff Size: Adult Regular)   Pulse 76   Ht 1.575 m (5' 2\")   Wt 48.1 kg (106 lb)   SpO2 99%   BMI 19.39 kg/m   Estimated body mass index is 19.39 kg/m  as calculated from the following:    Height as of this encounter: 1.575 m (5' 2\").    Weight as of this encounter: 48.1 kg (106 lb).  Medication Reconciliation: complete  Margarita Candelario LPN  "

## 2021-07-28 ENCOUNTER — TELEPHONE (OUTPATIENT)
Dept: OBGYN | Facility: OTHER | Age: 26
End: 2021-07-28

## 2021-07-28 DIAGNOSIS — Z11.8 SPECIAL SCREENING EXAMINATION FOR CHLAMYDIAL DISEASE: Primary | ICD-10-CM

## 2021-07-28 LAB
C TRACH DNA SPEC QL PROBE+SIG AMP: POSITIVE
N GONORRHOEA DNA SPEC QL NAA+PROBE: NEGATIVE

## 2021-07-28 RX ORDER — AZITHROMYCIN 500 MG/1
1000 TABLET, FILM COATED ORAL DAILY
Qty: 2 TABLET | Refills: 0 | Status: SHIPPED | OUTPATIENT
Start: 2021-07-28 | End: 2021-07-29

## 2021-07-28 NOTE — TELEPHONE ENCOUNTER
Call from patients mother inquiring on passing to 2 year old daugther as mother takes baths with daughter.     Please reach out to patient at [phone]

## 2021-07-28 NOTE — TELEPHONE ENCOUNTER
DATE:  7/28/2021   TIME OF RECEIPT FROM LAB:  818 am  LAB TEST:  chlamydia      LAB VALUE:  Positive  RESULTS GIVEN WITH READ-BACK TO (PROVIDER):  Kevyn Cervantes at 821 am.  TIME LAB VALUE REPORTED TO PROVIDER:   821 am.   Dr Kulkarni is in surgery he will treat pt when he is out of surgery.      MARKOS CHARLES LPN

## 2021-07-28 NOTE — TELEPHONE ENCOUNTER
Medication pended for patient and partner per Dr. Kulkarni. Please advise, thank you.    Caleb Kulkarni MD  [facility]  Notify + Chlamydia.  Call in Rx for Zithromax 1g po x 1 for her and partner.

## 2021-07-28 NOTE — PROGRESS NOTES
CC:  Consult from JARRETT Alejandre NP for menorrhagia/dysmenorhea    HPI:  Gerald Evans is a 26 year old female P2. No LMP recorded..  Menses are usually  Regular lasting 5 days with 4 of heavy flow. Longstanding, worse since childbirth.  Her menstrual flow is limiting her clothing choices and interferes with lifestyle/activites. She has previously tried IUD/depo-provera and BCP's without improvement.        Clots: Yes  Intermenstrual bleeding: No  Post-coital bleeding: No  Previous work-up:Yes, [place] [facility] [place]  Contraception: vas  Abnormal Pap: No  Dysmenorrhea: Yes  Pelvic pain:No  Dyspareunia: Yes    Past GYN history:        Last PAP smear:  LSIL 2018    Patients records are available and reviewed at today's visit.    No past medical history on file.    Past Surgical History:   Procedure Laterality Date     BIOPSY OF SKIN LESION        SECTION  2016    [facility].     ENT SURGERY      adenoidectomy     ENT SURGERY      anesthesia for tooth work     LAPAROSCOPIC SALPINGECTOMY Left 2014    Procedure: LAPAROSCOPIC SALPINGECTOMY;  Surgeon: Leonel Bethea MD;  Location: [facility] OR       Family History   Problem Relation Age of Onset     Heart Disease Mother         stent placement     C.A.D. Mother      Diabetes Paternal Grandmother      Hypertension Paternal Grandmother        Current Outpatient Medications   Medication Sig Dispense Refill     lamoTRIgine (LAMICTAL) 100 MG tablet TK 1 T PO  D       LORazepam (ATIVAN) 0.5 MG tablet        ondansetron (ZOFRAN) 8 MG tablet Take 8 mg by mouth every 8 hours as needed for nausea       azithromycin (ZITHROMAX) 500 MG tablet Take 2 tablets (1,000 mg) by mouth daily for 1 day 2 tablet 0       Allergies: Adderall, Amphetamine aspartate, Amphetamine sulfate, Amphetamine-dextroamphetamine, Dextroamphetamine, Strawberry, and Buspar [buspirone]    ROS:  CONSTITUTIONAL: NEGATIVE for fever, chills, change in weight  GI: NEGATIVE for nausea, abdominal  "pain, heartburn, or change in bowel habits.  + h/o IBS  : NEGATIVE for frequency, dysuria, hematuria, vaginal discharge  Neuro:  + migraines worse with mense      EXAM:  Blood pressure 108/62, pulse 76, height 1.575 m (5' 2\"), weight 48.1 kg (106 lb), SpO2 99 %, unknown if currently breastfeeding.   BMI= Body mass index is 19.39 kg/m .  General - pleasant female in no acute distress.  Abdomen - soft, nontender, nondistended, no hepatosplenomegaly.  Pelvic - EG: normal adult female, BUS: within normal limits, Vagina: well rugated, no discharge, Cervix: no lesions or CMT, Uterus: firm,  normal sized. Adnexae: no masses.  Diffuse pelvic TTP  Rectovaginal - deferred.  Musculoskeletal - no gross deformities or edema  Neurological - normal mental status.      ASSESSMENT/PLAN:  (Z12.4) Screening for cervical cancer  (primary encounter diagnosis)  Plan: A pap thin layer screen reflex to HPV if ASCUS         - recommend age 25 - 29, HPV Hold (Lab Only)         (N94.10) Dysmenorrhea/Dyspareunia/menorrhagia in female  Plan: Chlamydia trachomatis/Neisseria gonorrhoeae by         PCR, Wet preparation  R/o infection, discussed possibility of adhesions/endometriosis    Discussed expectant,  medical, IUD and and surgical options(diagnositc laparoscopy +-endometrial ablation/LAVH). Patient would like to await results of testing and think about her options and let me know how she wishes to proceed. Handouts on procedures reviewed with pt. 45 minutes were spent on the patient visit in face-to-face counseling, review or records, charting,  and coordination of care.              "

## 2021-07-28 NOTE — TELEPHONE ENCOUNTER
Medication was pended for both patient and partner and routed to OB/GYN pool for provider to sign.

## 2021-07-28 NOTE — TELEPHONE ENCOUNTER
I spoke with pt and she states she takes baths with 2 year old daughter. Wants to know if she could pass this on to her daughter. Please advise

## 2021-08-02 LAB
BKR LAB AP GYN ADEQUACY: ABNORMAL
BKR LAB AP GYN INTERPRETATION: ABNORMAL
BKR LAB AP HPV REFLEX: ABNORMAL
BKR LAB AP PREVIOUS ABNL DX: ABNORMAL
BKR LAB AP PREVIOUS ABNORMAL: ABNORMAL
PATH REPORT.COMMENTS IMP SPEC: ABNORMAL
PATH REPORT.RELEVANT HX SPEC: ABNORMAL

## 2021-08-02 PROCEDURE — G0124 SCREEN C/V THIN LAYER BY MD: HCPCS | Performed by: PATHOLOGY

## 2021-08-10 ENCOUNTER — OFFICE VISIT (OUTPATIENT)
Dept: OBGYN | Facility: OTHER | Age: 26
End: 2021-08-10
Attending: OBSTETRICS & GYNECOLOGY
Payer: COMMERCIAL

## 2021-08-10 VITALS
WEIGHT: 106 LBS | OXYGEN SATURATION: 97 % | BODY MASS INDEX: 19.51 KG/M2 | DIASTOLIC BLOOD PRESSURE: 60 MMHG | HEART RATE: 87 BPM | HEIGHT: 62 IN | SYSTOLIC BLOOD PRESSURE: 98 MMHG

## 2021-08-10 DIAGNOSIS — A74.9 CHLAMYDIA: ICD-10-CM

## 2021-08-10 DIAGNOSIS — N93.9 ABNORMAL UTERINE BLEEDING (AUB): Primary | ICD-10-CM

## 2021-08-10 DIAGNOSIS — R87.613 HGSIL (HIGH GRADE SQUAMOUS INTRAEPITHELIAL LESION) ON PAP SMEAR OF CERVIX: ICD-10-CM

## 2021-08-10 PROCEDURE — G0463 HOSPITAL OUTPT CLINIC VISIT: HCPCS

## 2021-08-10 PROCEDURE — G0463 HOSPITAL OUTPT CLINIC VISIT: HCPCS | Mod: 25

## 2021-08-10 PROCEDURE — 99213 OFFICE O/P EST LOW 20 MIN: CPT | Performed by: OBSTETRICS & GYNECOLOGY

## 2021-08-10 ASSESSMENT — MIFFLIN-ST. JEOR: SCORE: 1164.06

## 2021-08-10 ASSESSMENT — PAIN SCALES - GENERAL: PAINLEVEL: NO PAIN (0)

## 2021-08-10 NOTE — NURSING NOTE
"Chief Complaint   Patient presents with     Colposcopy       Initial BP 98/60 (BP Location: Left arm, Cuff Size: Adult Regular)   Pulse 87   Ht 1.575 m (5' 2\")   Wt 48.1 kg (106 lb)   SpO2 97%   BMI 19.39 kg/m   Estimated body mass index is 19.39 kg/m  as calculated from the following:    Height as of this encounter: 1.575 m (5' 2\").    Weight as of this encounter: 48.1 kg (106 lb).  Medication Reconciliation: complete  Margarita Candelario LPN  "

## 2021-08-10 NOTE — PROGRESS NOTES
S:  F/u AUB, pelvic pain, abnormal pap smear.  See my prior evals.  Pt recently had exam with pap smear which returned HGSIL.  Prior pap was LGSIL.  She and her  also treated for + chlamydia.  No new complaints.     BC:  Salpingectomy       Patient Active Problem List   Diagnosis     Hidradenitis suppurativa     Smoker     Blood type, Rh negative     Loose stools     Grief reaction     Anxiety and depression     Mild intermittent asthma     Need for Tdap vaccination     Need for immunization against influenza     NO SHOW     Encounter for triage in pregnant patient     Heartburn     Subchorionic hemorrhage in first trimester     Short interval between pregnancies affecting pregnancy in first trimester, antepartum     Vaginal bleeding     Previous  delivery, antepartum condition or complication     Previous  delivery, antepartum     Dyspareunia in female     History of ectopic pregnancy     History of      History of alcoholism (H)     Dysmenorrhea     Papanicolaou smear of cervix with low grade squamous intraepithelial lesion (LGSIL)     Allergic rhinitis due to other allergen     Anemia     Attention deficit hyperactivity disorder (ADHD)     Constipation     Esophageal reflux     Exercise-induced asthma     Migraine with aura     Oppositional defiant disorder     Panic disorder without agoraphobia     Encounter for supervision of other normal pregnancy, first trimester     Encounter for sterilization     [place]'s duct cyst     Pregnancy related bilateral lower abdominal cramping, antepartum--2018     Urinary tract infection in mother during first trimester of pregnancy--Macrobid x 7--2018          No past medical history on file.         Past Surgical History:   Procedure Laterality Date     BIOPSY OF SKIN LESION        SECTION  2016    [facility].     ENT SURGERY      adenoidectomy     ENT SURGERY      anesthesia for tooth work     LAPAROSCOPIC  "SALPINGECTOMY Left 12/27/2014    Procedure: LAPAROSCOPIC SALPINGECTOMY;  Surgeon: Leonel Bethea MD;  Location: [facility] OR            Social History     Tobacco Use     Smoking status: Current Every Day Smoker     Packs/day: 0.50     Years: 7.00     Pack years: 3.50     Types: Cigarettes     Smokeless tobacco: Never Used   Substance Use Topics     Alcohol use: Yes     Comment: occas            Family History   Problem Relation Age of Onset     Heart Disease Mother         stent placement     C.A.D. Mother      Diabetes Paternal Grandmother      Hypertension Paternal Grandmother                Allergies   Allergen Reactions     Adderall      Amphetamine Aspartate Other (See Comments)     Aggressive  Adderall       Amphetamine Sulfate Other (See Comments)     Aggressive  Adderall     Amphetamine-Dextroamphetamine      Other reaction(s): Other - Describe In Comment Field  Gets violent     Dextroamphetamine Other (See Comments)     Aggressive  Adderall     Strawberry      Buspar [Buspirone] Rash            Current Outpatient Medications   Medication Sig Dispense Refill     lamoTRIgine (LAMICTAL) 100 MG tablet TK 1 T PO  D       LORazepam (ATIVAN) 0.5 MG tablet        ondansetron (ZOFRAN) 8 MG tablet Take 8 mg by mouth every 8 hours as needed for nausea            Review Of Systems  Constitutional:  Denies fever  GI/ negative except as noted per hpi    O:   BP 98/60 (BP Location: Left arm, Cuff Size: Adult Regular)   Pulse 87   Ht 1.575 m (5' 2\")   Wt 48.1 kg (106 lb)   SpO2 97%   BMI 19.39 kg/m    Gen:  NAD, A and O           A:  HGSIL pap smear  Pelvic pain, Chlamydia (treated)  AUB    P:  Discussed options colposcopy vs proceeding directly to LEEP -and doing endometrial Bx (d and C) as well in regards to AUB.  Pt wishes to proceed with LEEP and D anc C.  Will obtain OLESYA/repeat chlamydia prior to surgery as well as preop and covid testing.  Reviewed goals, risks, alternatives for planned procedure.  Including risk of " bleeding, infection, damage to nerves, blood vessels, bowel and bladder. Discussed recovery period and expected discomfort.. All questions were answered.  Preoperative instructions discussed.  Scheduled 8/25/21.  Pt desires to proceed.

## 2021-08-11 ENCOUNTER — PREP FOR PROCEDURE (OUTPATIENT)
Dept: OBGYN | Facility: OTHER | Age: 26
End: 2021-08-11

## 2021-08-11 DIAGNOSIS — Z01.812 ENCOUNTER FOR PREOPERATIVE SCREENING LABORATORY TESTING FOR COVID-19 VIRUS: ICD-10-CM

## 2021-08-11 DIAGNOSIS — N93.9 ABNORMAL UTERINE BLEEDING: Primary | ICD-10-CM

## 2021-08-11 DIAGNOSIS — Z11.52 ENCOUNTER FOR PREOPERATIVE SCREENING LABORATORY TESTING FOR COVID-19 VIRUS: ICD-10-CM

## 2021-08-11 DIAGNOSIS — R87.613 HSIL (HIGH GRADE SQUAMOUS INTRAEPITHELIAL LESION) ON PAP SMEAR OF CERVIX: ICD-10-CM

## 2021-08-18 ENCOUNTER — ANESTHESIA EVENT (OUTPATIENT)
Dept: SURGERY | Facility: HOSPITAL | Age: 26
End: 2021-08-18
Payer: COMMERCIAL

## 2021-08-18 ASSESSMENT — LIFESTYLE VARIABLES: TOBACCO_USE: 1

## 2021-08-18 NOTE — ANESTHESIA PREPROCEDURE EVALUATION
Anesthesia Pre-Procedure Evaluation    Patient: Gerald Evans   MRN: [id number] : 1995        Preoperative Diagnosis: Abnormal uterine bleeding [N93.9]  HSIL (high grade squamous intraepithelial lesion) on Pap smear of cervix [R87.613]   Procedure : Procedure(s):  DILATION AND CURETTAGE, UTERUS  LOOP ELECTROSURGICAL EXCISION PROCEDURE     No past medical history on file.   Past Surgical History:   Procedure Laterality Date     BIOPSY OF SKIN LESION        SECTION  2016    [facility].     ENT SURGERY      adenoidectomy     ENT SURGERY      anesthesia for tooth work     LAPAROSCOPIC SALPINGECTOMY Left 2014    Procedure: LAPAROSCOPIC SALPINGECTOMY;  Surgeon: Leonel Bethea MD;  Location: [facility]      Allergies   Allergen Reactions     Adderall      Amphetamine Aspartate Other (See Comments)     Aggressive  Adderall       Amphetamine Sulfate Other (See Comments)     Aggressive  Adderall     Amphetamine-Dextroamphetamine      Other reaction(s): Other - Describe In Comment Field  Gets violent     Dextroamphetamine Other (See Comments)     Aggressive  Adderall     Strawberry      Buspar [Buspirone] Rash      Social History     Tobacco Use     Smoking status: Current Every Day Smoker     Packs/day: 0.50     Years: 7.00     Pack years: 3.50     Types: Cigarettes     Smokeless tobacco: Never Used   Substance Use Topics     Alcohol use: Yes     Comment: occas      Wt Readings from Last 1 Encounters:   08/10/21 48.1 kg (106 lb)        Anesthesia Evaluation   Pt has had prior anesthetic.     No history of anesthetic complications       ROS/MED HX  ENT/Pulmonary: Comment: Pt states she thinks she has a sinus infection-can feel the pressure, no drainage.    (+) allergic rhinitis, tobacco use, Current use, 1 packs/day, Intermittent, asthma     Neurologic:     (+) migraines,     Cardiovascular:  - neg cardiovascular ROS     METS/Exercise Tolerance:     Hematologic:  - neg hematologic  ROS      Musculoskeletal:  - neg musculoskeletal ROS     GI/Hepatic:     (+) GERD, Asymptomatic on medication,     Renal/Genitourinary:       Endo:  - neg endo ROS     Psychiatric/Substance Use: Comment: History of alcoholism (H)  Attention deficit hyperactivity disorder (ADHD)  Oppositional defiant disorder     Panic disorder without agoraphobia        (+) psychiatric history anxiety and depression alcohol abuse     Infectious Disease:  - neg infectious disease ROS     Malignancy:  - neg malignancy ROS     Other:  - neg other ROS          Physical Exam    Airway        Mallampati: II   TM distance: > 3 FB   Neck ROM: full   Mouth opening: > 3 cm    Respiratory Devices and Support         Dental  no notable dental history       B=Bridge, C=Chipped, L=Loose, M=Missing    Cardiovascular   cardiovascular exam normal       Rhythm and rate: regular and normal     Pulmonary   pulmonary exam normal        breath sounds clear to auscultation           OUTSIDE LABS:  CBC:   Lab Results   Component Value Date    WBC 11.6 (H) 01/03/2019    WBC 7.1 08/14/2018    HGB 10.7 (L) 01/03/2019    HGB 13.1 08/14/2018    HCT 31.5 (L) 01/03/2019    HCT 37.9 08/14/2018     01/03/2019     08/14/2018     BMP:   Lab Results   Component Value Date     05/25/2017     12/27/2014    POTASSIUM 3.5 05/25/2017    POTASSIUM 3.9 12/27/2014    CHLORIDE 107 05/25/2017    CHLORIDE 106 12/27/2014    CO2 27 05/25/2017    CO2 25 12/27/2014    BUN 11 05/25/2017    BUN 12 12/27/2014    CR 0.68 05/25/2017    CR 0.66 12/27/2014    GLC 87 05/25/2017    GLC 99 12/27/2014     COAGS: No results found for: PTT, INR, FIBR  POC:   Lab Results   Component Value Date    HCG Negative 03/21/2021    HCGS Negative 02/24/2014     HEPATIC:   Lab Results   Component Value Date    ALBUMIN 3.8 05/25/2017    PROTTOTAL 7.1 05/25/2017    ALT 12 05/25/2017    AST 7 05/25/2017    ALKPHOS 78 05/25/2017    BILITOTAL 0.2 05/25/2017     OTHER:   Lab Results   Component  Value Date    JUNI 8.7 05/25/2017    TSH 0.74 08/17/2015    CRP <2.9 12/27/2014       Anesthesia Plan    ASA Status:  2   NPO Status:  NPO Appropriate    Anesthesia Type: MAC.     - Reason for MAC: chronic cardiopulmonary disease, straight local not clinically adequate   Induction: Intravenous, Propofol.   Maintenance: Balanced.        Consents    Anesthesia Plan(s) and associated risks, benefits, and realistic alternatives discussed. Questions answered and patient/representative(s) expressed understanding.     - Discussed with:  Patient      - Extended Intubation/Ventilatory Support Discussed: Yes.      - Patient is DNR/DNI Status: No    Use of blood products discussed: No .     Postoperative Care            Comments:    H and P date 8/19/21  Requesting anti-anxiety medications in pre op, will offer once consent done  hcg ELLIOTT Vanegas CRNA

## 2021-08-19 ENCOUNTER — LAB (OUTPATIENT)
Dept: LAB | Facility: OTHER | Age: 26
End: 2021-08-19
Attending: NURSE PRACTITIONER
Payer: COMMERCIAL

## 2021-08-19 ENCOUNTER — OFFICE VISIT (OUTPATIENT)
Dept: OBGYN | Facility: OTHER | Age: 26
End: 2021-08-19
Attending: NURSE PRACTITIONER
Payer: COMMERCIAL

## 2021-08-19 VITALS
SYSTOLIC BLOOD PRESSURE: 100 MMHG | HEART RATE: 82 BPM | DIASTOLIC BLOOD PRESSURE: 67 MMHG | WEIGHT: 106 LBS | HEIGHT: 62 IN | BODY MASS INDEX: 19.51 KG/M2 | OXYGEN SATURATION: 99 %

## 2021-08-19 DIAGNOSIS — Z11.8 SPECIAL SCREENING EXAMINATION FOR CHLAMYDIAL DISEASE: ICD-10-CM

## 2021-08-19 DIAGNOSIS — Z11.8 SPECIAL SCREENING EXAMINATION FOR CHLAMYDIAL DISEASE: Primary | ICD-10-CM

## 2021-08-19 DIAGNOSIS — Z01.818 PREOP GENERAL PHYSICAL EXAM: Primary | ICD-10-CM

## 2021-08-19 PROBLEM — Z30.2 ENCOUNTER FOR STERILIZATION: Status: RESOLVED | Noted: 2018-07-26 | Resolved: 2021-08-19

## 2021-08-19 PROBLEM — R10.30 PREGNANCY RELATED BILATERAL LOWER ABDOMINAL CRAMPING, ANTEPARTUM: Status: RESOLVED | Noted: 2018-08-23 | Resolved: 2021-08-19

## 2021-08-19 PROBLEM — N36.8 SKENE'S DUCT CYST: Chronic | Status: ACTIVE | Noted: 2018-07-26

## 2021-08-19 PROBLEM — Z34.81 ENCOUNTER FOR SUPERVISION OF OTHER NORMAL PREGNANCY, FIRST TRIMESTER: Status: RESOLVED | Noted: 2018-07-26 | Resolved: 2021-08-19

## 2021-08-19 PROBLEM — O23.41 URINARY TRACT INFECTION IN MOTHER DURING FIRST TRIMESTER OF PREGNANCY: Status: RESOLVED | Noted: 2018-08-23 | Resolved: 2021-08-19

## 2021-08-19 PROBLEM — O99.891 PREGNANCY RELATED BILATERAL LOWER ABDOMINAL CRAMPING, ANTEPARTUM: Status: RESOLVED | Noted: 2018-08-23 | Resolved: 2021-08-19

## 2021-08-19 PROCEDURE — G0463 HOSPITAL OUTPT CLINIC VISIT: HCPCS | Performed by: COUNSELOR

## 2021-08-19 PROCEDURE — 99213 OFFICE O/P EST LOW 20 MIN: CPT | Performed by: NURSE PRACTITIONER

## 2021-08-19 PROCEDURE — 87491 CHLMYD TRACH DNA AMP PROBE: CPT | Mod: ZL

## 2021-08-19 PROCEDURE — G0463 HOSPITAL OUTPT CLINIC VISIT: HCPCS | Mod: 25 | Performed by: COUNSELOR

## 2021-08-19 ASSESSMENT — MIFFLIN-ST. JEOR: SCORE: 1164.06

## 2021-08-19 ASSESSMENT — PAIN SCALES - GENERAL: PAINLEVEL: MODERATE PAIN (4)

## 2021-08-19 NOTE — PATIENT INSTRUCTIONS

## 2021-08-19 NOTE — PROGRESS NOTES
[facility] [place]  [address]  [place] [address]  Phone: [phone]  Primary Provider: Jennie Carter  Pre-op Performing Provider: VINICIUS NICHOLE      PREOPERATIVE EVALUATION:  Today's date: 8/19/2021    Gerald Evans is a 26 year old female who presents for a preoperative evaluation.    Surgical Information:  Surgery/Procedure: D&C, LEEP  [place] [facility] [place]: Dr Kulkarni  Surgery Date: 8/25/2021  Time of Surgery: Unknown   Where patient plans to recover: At home with family  Fax number for surgical facility: Note does not need to be faxed, will be available electronically in Epic.    Type of Anesthesia Anticipated: to be determined    Assessment & Plan     The proposed surgical procedure is considered LOW risk.    Preop general physical exam             Risks and Recommendations:  The patient has the following additional risks and recommendations for perioperative complications:   - No identified additional risk factors other than previously addressed    Medication Instructions:  Lamictal is taken at HS daily and she will refrain from taking PRN medications the morning of surgery.     RECOMMENDATION:  APPROVAL GIVEN to proceed with proposed procedure pending review of diagnostic evaluation.  She is scheduling her covid testing at [facility] and will have results sent for day of surgery.           Subjective     HPI related to upcoming procedure: abnormal uterine bleeding and persistent abnormal pap smear.      Preop Questions 8/19/2021   1. Have you ever had a heart attack or stroke? No   2. Have you ever had surgery on your heart or blood vessels, such as a stent placement, a coronary artery bypass, or surgery on an artery in your head, neck, heart, or legs? No   3. Do you have chest pain with activity? No   4. Do you have a history of  heart failure? No   5. Do you currently have a cold, bronchitis or symptoms of other infection? No   6. Do you have a cough, shortness  of breath, or wheezing? No   7. Do you or anyone in your family have previous history of blood clots? No   8. Do you or does anyone in your family have a serious bleeding problem such as prolonged bleeding following surgeries or cuts? No   9. Have you ever had problems with anemia or been told to take iron pills? YES - with pregnancies   10. Have you had any abnormal blood loss such as black, tarry or bloody stools, or abnormal vaginal bleeding? No   11. Have you ever had a blood transfusion? No   12. Are you willing to have a blood transfusion if it is medically needed before, during, or after your surgery? Yes   13. Have you or any of your relatives ever had problems with anesthesia? No   14. Do you have sleep apnea, excessive snoring or daytime drowsiness? No   15. Do you have any artifical heart valves or other implanted medical devices like a pacemaker, defibrillator, or continuous glucose monitor? No   16. Do you have artificial joints? No   17. Are you allergic to latex? YES:    18. Is there any chance that you may be pregnant? No       Health Care Directive:  Patient does not have a Health Care Directive or Living Will: Discussed advance care planning with patient; however, patient declined at this time.    Preoperative Review of :   reviewed - no record of controlled substances prescribed.      Status of Chronic Conditions:  See problem list for active medical problems.  Problems all longstanding and stable, except as noted/documented.       Review of Systems  CONSTITUTIONAL: NEGATIVE for fever, chills, change in weight  INTEGUMENTARY/SKIN: NEGATIVE for worrisome rashes, moles or lesions  EYES: NEGATIVE for vision changes or irritation  ENT/MOUTH: POSITIVE for multiple broken molars top and bottom  RESP: NEGATIVE for significant cough or SOB  CV: NEGATIVE for chest pain, palpitations or peripheral edema  GI: NEGATIVE for nausea, abdominal pain, heartburn, or change in bowel habits  : NEGATIVE for  frequency, dysuria, or hematuria  MUSCULOSKELETAL: NEGATIVE for significant arthralgias or myalgia  NEURO: NEGATIVE for weakness, dizziness or paresthesias  ENDOCRINE: NEGATIVE for temperature intolerance, skin/hair changes  HEME: NEGATIVE for bleeding problems  PSYCHIATRIC: NEGATIVE for changes in mood or affect    Patient Active Problem List    Diagnosis Date Noted     HSIL (high grade squamous intraepithelial lesion) on Pap smear of cervix 2021     Priority: Medium     Added automatically from request for surgery [id number]       Lakeline's duct cyst 2018     Priority: Medium     Monitor during pregnacy       Papanicolaou smear of cervix with low grade squamous intraepithelial lesion (LGSIL) 2018     Priority: Medium     Repeat at annual (24)       Dyspareunia in female 2018     Priority: Medium     History of ectopic pregnancy 2018     Priority: Medium     History of  2018     Priority: Medium     History of alcoholism (H) 2018     Priority: Medium     Dysmenorrhea 2018     Priority: Medium     Vaginal bleeding 2016     Priority: Medium     Refusing rhogam despite counseling       Anxiety and depression 2015     Priority: Medium     tools       Loose stools 2015     Priority: Medium     Hidradenitis suppurativa 2013     Priority: Medium     Smoker 2013     Priority: Medium     Trying to quit       Panic disorder without agoraphobia 2013     Priority: Medium     Overview:   [facility]       Migraine with aura 2010     Priority: Medium     Overview:   [facility] Update       Oppositional defiant disorder 2008     Priority: Medium     Overview:   [facility] Update 10/11       Constipation 09/10/2007     Priority: Medium     Overview:   O Update       Attention deficit hyperactivity disorder (ADHD) 2003     Priority: Medium     Overview:   O Update 10 2016        No past medical history on file.  Past Surgical History:  "  Procedure Laterality Date     BIOPSY OF SKIN LESION  2013      SECTION  2016    [facility].     ENT SURGERY      adenoidectomy     ENT SURGERY      anesthesia for tooth work     LAPAROSCOPIC SALPINGECTOMY Left 2014    Procedure: LAPAROSCOPIC SALPINGECTOMY;  Surgeon: Leonel Bethea MD;  Location: [facility]     Current Outpatient Medications   Medication Sig Dispense Refill     lamoTRIgine (LAMICTAL) 100 MG tablet TK 1 T PO  D       LORazepam (ATIVAN) 0.5 MG tablet        ondansetron (ZOFRAN) 8 MG tablet Take 8 mg by mouth every 8 hours as needed for nausea         Allergies   Allergen Reactions     Adderall      Amphetamine Aspartate Other (See Comments)     Aggressive  Adderall       Amphetamine Sulfate Other (See Comments)     Aggressive  Adderall     Amphetamine-Dextroamphetamine      Other reaction(s): Other - Describe In Comment Field  Gets violent     Dextroamphetamine Other (See Comments)     Aggressive  Adderall     Strawberry      Buspar [Buspirone] Rash        Social History     Tobacco Use     Smoking status: Current Every Day Smoker     Packs/day: 0.50     Years: 7.00     Pack years: 3.50     Types: Cigarettes     Smokeless tobacco: Never Used   Substance Use Topics     Alcohol use: Yes     Comment: occas       History   Drug Use No         Objective     /67 (BP Location: Right arm, Patient Position: Sitting, Cuff Size: Adult Regular)   Pulse 82   Ht 1.575 m (5' 2\")   Wt 48.1 kg (106 lb)   SpO2 99%   BMI 19.39 kg/m      Physical Exam    GENERAL APPEARANCE: healthy, alert and no distress     EYES: EOMI, PERRL     HENT: ear canals and TM's normal and nose and mouth without ulcers or lesions     NECK: no adenopathy, no asymmetry, masses, or scars and thyroid normal to palpation     RESP: lungs clear to auscultation - no rales, rhonchi or wheezes     CV: regular rates and rhythm, normal S1 S2, no S3 or S4 and no murmur, click or rub     ABDOMEN:  soft, nontender, no HSM or " masses and bowel sounds normal     MS: extremities normal- no gross deformities noted, no evidence of inflammation in joints, FROM in all extremities.     SKIN: no suspicious lesions or rashes     NEURO: Normal strength and tone, sensory exam grossly normal, mentation intact and speech normal     PSYCH: mentation appears normal. and affect normal/bright     LYMPHATICS: No cervical adenopathy    No results for input(s): HGB, PLT, INR, NA, POTASSIUM, CR, A1C in the last 21193 hours.     Diagnostics:  Labs pending at this time.  Results will be reviewed when available.   No EKG required for low risk surgery (cataract, skin procedure, breast biopsy, etc).    Revised Cardiac Risk Index (RCRI):  The patient has the following serious cardiovascular risks for perioperative complications:   - No serious cardiac risks = 0 points     RCRI Interpretation: 0 points: Class I (very low risk - 0.4% complication rate)           Signed Electronically by: Marquita Somers NP  Copy of this evaluation report is provided to requesting physician.

## 2021-08-19 NOTE — NURSING NOTE
"Chief Complaint   Patient presents with     Pre-Op Exam     8/25/2021 D&C Dr Kulkarni       Initial /67 (BP Location: Right arm, Patient Position: Sitting, Cuff Size: Adult Regular)   Pulse 82   Ht 1.575 m (5' 2\")   Wt 48.1 kg (106 lb)   SpO2 99%   BMI 19.39 kg/m   Estimated body mass index is 19.39 kg/m  as calculated from the following:    Height as of this encounter: 1.575 m (5' 2\").    Weight as of this encounter: 48.1 kg (106 lb).  Medication Reconciliation: complete     Donna Arteaga LPN    "

## 2021-08-19 NOTE — H&P (VIEW-ONLY)
[facility] [place]  [address]  [place] [address]  Phone: [phone]  Primary Provider: Jennie Carter  Pre-op Performing Provider: VINICIUS NICHOLE      PREOPERATIVE EVALUATION:  Today's date: 8/19/2021    Gerald Evans is a 26 year old female who presents for a preoperative evaluation.    Surgical Information:  Surgery/Procedure: D&C, LEEP  Sur[place] [facility] [place]  Surgeon: Dr Kulkarni  Surgery Date: 8/25/2021  Time of Surgery: Unknown   Where patient plans to recover: At home with family  Fax number for surgical facility: Note does not need to be faxed, will be available electronically in Epic.    Type of Anesthesia Anticipated: to be determined    Assessment & Plan     The proposed surgical procedure is considered LOW risk.    Preop general physical exam             Risks and Recommendations:  The patient has the following additional risks and recommendations for perioperative complications:   - No identified additional risk factors other than previously addressed    Medication Instructions:  Lamictal is taken at HS daily and she will refrain from taking PRN medications the morning of surgery.     RECOMMENDATION:  APPROVAL GIVEN to proceed with proposed procedure pending review of diagnostic evaluation.  She is scheduling her covid testing at [facility] and will have results sent for day of surgery.           Subjective     HPI related to upcoming procedure: abnormal uterine bleeding and persistent abnormal pap smear.      Preop Questions 8/19/2021   1. Have you ever had a heart attack or stroke? No   2. Have you ever had surgery on your heart or blood vessels, such as a stent placement, a coronary artery bypass, or surgery on an artery in your head, neck, heart, or legs? No   3. Do you have chest pain with activity? No   4. Do you have a history of  heart failure? No   5. Do you currently have a cold, bronchitis or symptoms of other infection? No   6. Do you have a cough, shortness  of breath, or wheezing? No   7. Do you or anyone in your family have previous history of blood clots? No   8. Do you or does anyone in your family have a serious bleeding problem such as prolonged bleeding following surgeries or cuts? No   9. Have you ever had problems with anemia or been told to take iron pills? YES - with pregnancies   10. Have you had any abnormal blood loss such as black, tarry or bloody stools, or abnormal vaginal bleeding? No   11. Have you ever had a blood transfusion? No   12. Are you willing to have a blood transfusion if it is medically needed before, during, or after your surgery? Yes   13. Have you or any of your relatives ever had problems with anesthesia? No   14. Do you have sleep apnea, excessive snoring or daytime drowsiness? No   15. Do you have any artifical heart valves or other implanted medical devices like a pacemaker, defibrillator, or continuous glucose monitor? No   16. Do you have artificial joints? No   17. Are you allergic to latex? YES:    18. Is there any chance that you may be pregnant? No       Health Care Directive:  Patient does not have a Health Care Directive or Living Will: Discussed advance care planning with patient; however, patient declined at this time.    Preoperative Review of :   reviewed - no record of controlled substances prescribed.      Status of Chronic Conditions:  See problem list for active medical problems.  Problems all longstanding and stable, except as noted/documented.       Review of Systems  CONSTITUTIONAL: NEGATIVE for fever, chills, change in weight  INTEGUMENTARY/SKIN: NEGATIVE for worrisome rashes, moles or lesions  EYES: NEGATIVE for vision changes or irritation  ENT/MOUTH: POSITIVE for multiple broken molars top and bottom  RESP: NEGATIVE for significant cough or SOB  CV: NEGATIVE for chest pain, palpitations or peripheral edema  GI: NEGATIVE for nausea, abdominal pain, heartburn, or change in bowel habits  : NEGATIVE for  frequency, dysuria, or hematuria  MUSCULOSKELETAL: NEGATIVE for significant arthralgias or myalgia  NEURO: NEGATIVE for weakness, dizziness or paresthesias  ENDOCRINE: NEGATIVE for temperature intolerance, skin/hair changes  HEME: NEGATIVE for bleeding problems  PSYCHIATRIC: NEGATIVE for changes in mood or affect    Patient Active Problem List    Diagnosis Date Noted     HSIL (high grade squamous intraepithelial lesion) on Pap smear of cervix 2021     Priority: Medium     Added automatically from request for surgery [id number]       [place]'s duct cyst 2018     Priority: Medium     Monitor during pregnacy       Papanicolaou smear of cervix with low grade squamous intraepithelial lesion (LGSIL) 2018     Priority: Medium     Repeat at annual (24)       Dyspareunia in female 2018     Priority: Medium     History of ectopic pregnancy 2018     Priority: Medium     History of  2018     Priority: Medium     History of alcoholism (H) 2018     Priority: Medium     Dysmenorrhea 2018     Priority: Medium     Vaginal bleeding 2016     Priority: Medium     Refusing rhogam despite counseling       Anxiety and depression 2015     Priority: Medium     tools       Loose stools 2015     Priority: Medium     Hidradenitis suppurativa 2013     Priority: Medium     Smoker 2013     Priority: Medium     Trying to quit       Panic disorder without agoraphobia 2013     Priority: Medium     Overview:   [facility]       Migraine with aura 2010     Priority: Medium     Overview:   [facility] [place] [facility]       Oppositional defiant disorder 2008     Priority: Medium     Overview:   IMO Update 10/11       Constipation 09/10/2007     Priority: Medium     Overview:   O Update       Attention deficit hyperactivity disorder (ADHD) 2003     Priority: Medium     Overview:   O Update 10 2016        No past medical history on file.  Past Surgical History:  "  Procedure Laterality Date     BIOPSY OF SKIN LESION  2013      SECTION  2016    [facility].     ENT SURGERY      adenoidectomy     ENT SURGERY      anesthesia for tooth work     LAPAROSCOPIC SALPINGECTOMY Left 2014    Procedure: LAPAROSCOPIC SALPINGECTOMY;  Surgeon: Leonel Bethea MD;  Location: [facility]     Current Outpatient Medications   Medication Sig Dispense Refill     lamoTRIgine (LAMICTAL) 100 MG tablet TK 1 T PO  D       LORazepam (ATIVAN) 0.5 MG tablet        ondansetron (ZOFRAN) 8 MG tablet Take 8 mg by mouth every 8 hours as needed for nausea         Allergies   Allergen Reactions     Adderall      Amphetamine Aspartate Other (See Comments)     Aggressive  Adderall       Amphetamine Sulfate Other (See Comments)     Aggressive  Adderall     Amphetamine-Dextroamphetamine      Other reaction(s): Other - Describe In Comment Field  Gets violent     Dextroamphetamine Other (See Comments)     Aggressive  Adderall     Strawberry      Buspar [Buspirone] Rash        Social History     Tobacco Use     Smoking status: Current Every Day Smoker     Packs/day: 0.50     Years: 7.00     Pack years: 3.50     Types: Cigarettes     Smokeless tobacco: Never Used   Substance Use Topics     Alcohol use: Yes     Comment: occas       History   Drug Use No         Objective     /67 (BP Location: Right arm, Patient Position: Sitting, Cuff Size: Adult Regular)   Pulse 82   Ht 1.575 m (5' 2\")   Wt 48.1 kg (106 lb)   SpO2 99%   BMI 19.39 kg/m      Physical Exam    GENERAL APPEARANCE: healthy, alert and no distress     EYES: EOMI, PERRL     HENT: ear canals and TM's normal and nose and mouth without ulcers or lesions     NECK: no adenopathy, no asymmetry, masses, or scars and thyroid normal to palpation     RESP: lungs clear to auscultation - no rales, rhonchi or wheezes     CV: regular rates and rhythm, normal S1 S2, no S3 or S4 and no murmur, click or rub     ABDOMEN:  soft, nontender, no HSM or " masses and bowel sounds normal     MS: extremities normal- no gross deformities noted, no evidence of inflammation in joints, FROM in all extremities.     SKIN: no suspicious lesions or rashes     NEURO: Normal strength and tone, sensory exam grossly normal, mentation intact and speech normal     PSYCH: mentation appears normal. and affect normal/bright     LYMPHATICS: No cervical adenopathy    No results for input(s): HGB, PLT, INR, NA, POTASSIUM, CR, A1C in the last 49187 hours.     Diagnostics:  Labs pending at this time.  Results will be reviewed when available.   No EKG required for low risk surgery (cataract, skin procedure, breast biopsy, etc).    Revised Cardiac Risk Index (RCRI):  The patient has the following serious cardiovascular risks for perioperative complications:   - No serious cardiac risks = 0 points     RCRI Interpretation: 0 points: Class I (very low risk - 0.4% complication rate)           Signed Electronically by: Marquita Somers NP  Copy of this evaluation report is provided to requesting physician.

## 2021-08-19 NOTE — PROGRESS NOTES
"[facility] [place]  [address]  [place] [address]  Phone: [phone]  Primary Provider: Jennie Carter  Pre-op Performing Provider: VINICIUS NICHOLE      PREOPERATIVE EVALUATION:  Today's date: 8/19/2021    Gerald Evans is a 26 year old female who presents for a preoperative evaluation.    Surgical Information:  Surgery/Procedure: D&C  Sur[place] [facility] [place]  Surgeon: Dr Kulkarni  Surgery Date: 8/25/2021  Time of Surgery: Unknown   Where patient plans to recover: At home with family  Fax number for surgical facility: Note does not need to be faxed, will be available electronically in Epic.    Type of Anesthesia Anticipated: to be determined    Assessment & Plan     The proposed surgical procedure is considered {HIGH=major cardiovascular or procedures requiring prolonged anesthesia >4 hours or large fluid shifts;    INTERMEDIATE=abdominal, most orthopedic and intrathoracic surgery; LOW= endoscopy, cataract and breast surgery:587360} risk.    {Diag Picklist:940370}         {Risks and Recommendations (Optional):024878}    {Medication HOLD Times for PREOP (Optional):761113}    RECOMMENDATION:  {IMPORTANT - Approval:990323::\"APPROVAL GIVEN to proceed with proposed procedure, without further diagnostic evaluation.\"}    {Tests, documents, or independent historian(s) (Optional):415744}  {Independent interpretation of tests (Optional):647369::\"Independent interpretation of a test performed by another physician/other qualified health care professional (not separately reported) - ***\"}  {Discussion of management or test interpretation (Optional):472933::\"Discussion of management or test interpretation with external physician/other qualified healthcare professional/appropriate source - ***\"}  {Diagnosis or treatment significantly limited by social determinants (optional):514115::\"Diagnosis or treatment significantly limited by social determinants of health - ***\"}    {2021 E&M time:505109}    {Provider "  Link to [facility] Help Grid :385635}    Subjective     HPI related to upcoming procedure: ***      Preop Questions 8/19/2021   1. Have you ever had a heart attack or stroke? No   2. Have you ever had surgery on your heart or blood vessels, such as a stent placement, a coronary artery bypass, or surgery on an artery in your head, neck, heart, or legs? No   3. Do you have chest pain with activity? No   4. Do you have a history of  heart failure? No   5. Do you currently have a cold, bronchitis or symptoms of other infection? No   6. Do you have a cough, shortness of breath, or wheezing? No   7. Do you or anyone in your family have previous history of blood clots? No   8. Do you or does anyone in your family have a serious bleeding problem such as prolonged bleeding following surgeries or cuts? No   9. Have you ever had problems with anemia or been told to take iron pills? YES - ***   10. Have you had any abnormal blood loss such as black, tarry or bloody stools, or abnormal vaginal bleeding? No   11. Have you ever had a blood transfusion? No   12. Are you willing to have a blood transfusion if it is medically needed before, during, or after your surgery? Yes   13. Have you or any of your relatives ever had problems with anesthesia? No   14. Do you have sleep apnea, excessive snoring or daytime drowsiness? No   15. Do you have any artifical heart valves or other implanted medical devices like a pacemaker, defibrillator, or continuous glucose monitor? No   16. Do you have artificial joints? No   17. Are you allergic to latex? YES: ***   18. Is there any chance that you may be pregnant? No     Health Care Directive:  Patient does not have a Health Care Directive or Living Will: {ADVANCE_DIRECTIVE_STATUS:127528}    Preoperative Review of :  {Mnpmpreport:079475}  {Review MNPMP for all patients per [facility] Profile:290458}    {Chronic problem details (Optional) :492455}    Review of Systems  {ROS Preop  Choices:491790}    Patient Active Problem List    Diagnosis Date Noted     HSIL (high grade squamous intraepithelial lesion) on Pap smear of cervix 2021     Priority: Medium     Added automatically from request for surgery [id number]       Pregnancy related bilateral lower abdominal cramping, antepartum--2018     Priority: Medium     Urinary tract infection in mother during first trimester of pregnancy--Macrobid x 7--2018     Priority: Medium     Encounter for supervision of other normal pregnancy, first trimester 2018     Priority: Medium     Encounter for sterilization 2018     Priority: Medium     Counseled, Sterilization request forms signed 18       [place]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 referral with Dr Kelsea Rodriguez at [facility] for planned  .  Planning vasectomy for BC.  Tubal if CS.   Prior left salpingectomy.  H/o panic d/o and required Gen anesthesia with prior cd.  Desires Gen Anesthesia for CD delivery.   Serial US growth (h/o IUGR), Placental lakes.  Q 4 weeks    Level 2 US for placentation/anantomy:  Placental site: posterior, no previa.  Placental lakes.   Needs sterilization forms signed. Done .   Plan breastfeeding  Declines Tdap  RHIG done  H/o  delivery (twins 31 weeks).   Delivery indication was IUGR with absent diastolic flow and  "NOT PTL.  LTCS.  Girl       Previous  delivery, antepartum 2016     Priority: Medium     Subchorionic hemorrhage in first trimester 2016     Priority: Medium     Refusing rhogam       Short interval between pregnancies affecting pregnancy in first trimester, antepartum 2016     Priority: Medium     Vaginal bleeding 2016     Priority: Medium     Refusing rhogam despite counseling       Heartburn 2015     Priority: Medium     Encounter for triage in pregnant patient 2015     Priority: Medium     NO SHOW 10/26/2015     Priority: Medium     No showed Dr. Fam 10/16/15; 12/23/15; 16; 16; 16; 16; 8/3/16; 16  Letter sent regarding multiple missed appointments 12/23/15.         Need for immunization against influenza 09/15/2015     Priority: Medium     refuses       Anxiety and depression 2015     Priority: Medium     tools       Mild intermittent asthma 2015     Priority: Medium     Need for Tdap vaccination 2015     Priority: Medium     Refuses despite counseling       Grief reaction 2015     Priority: Medium     8/12/15: Mother \"on life-support in [place] with <10% chance survival\"       Loose stools 2015     Priority: Medium     Blood type, Rh negative 2015     Priority: Medium     Rhogam done 12/15/15       Hidradenitis suppurativa 2013     Priority: Medium     Smoker 2013     Priority: Medium     Trying to quit       Panic disorder without agoraphobia 2013     Priority: Medium     Overview:   [facility]       Esophageal reflux 2012     Priority: Medium     Anemia 02/10/2012     Priority: Medium     Overview:   IMO Update 10/11       Migraine with aura 2010     Priority: Medium     Overview:   IMO Update       Exercise-induced asthma 2009     Priority: Medium     Overview:   Mild       Allergic rhinitis due to other allergen 2008     Priority: Medium     Oppositional defiant " "disorder 2008     Priority: Medium     Overview:   IMO Update 10/11       Constipation 09/10/2007     Priority: Medium     Overview:   IMO Update       Attention deficit hyperactivity disorder (ADHD) 2003     Priority: Medium     Overview:   IMO Update 10 2016        No past medical history on file.  Past Surgical History:   Procedure Laterality Date     BIOPSY OF SKIN LESION  2013      SECTION  2016    [facility].     ENT SURGERY      adenoidectomy     ENT SURGERY      anesthesia for tooth work     LAPAROSCOPIC SALPINGECTOMY Left 2014    Procedure: LAPAROSCOPIC SALPINGECTOMY;  Surgeon: Leonel Bethea MD;  Location: [facility]     Current Outpatient Medications   Medication Sig Dispense Refill     lamoTRIgine (LAMICTAL) 100 MG tablet TK 1 T PO  D       LORazepam (ATIVAN) 0.5 MG tablet        ondansetron (ZOFRAN) 8 MG tablet Take 8 mg by mouth every 8 hours as needed for nausea         Allergies   Allergen Reactions     Adderall      Amphetamine Aspartate Other (See Comments)     Aggressive  Adderall       Amphetamine Sulfate Other (See Comments)     Aggressive  Adderall     Amphetamine-Dextroamphetamine      Other reaction(s): Other - Describe In Comment Field  Gets violent     Dextroamphetamine Other (See Comments)     Aggressive  Adderall     Strawberry      Buspar [Buspirone] Rash        Social History     Tobacco Use     Smoking status: Current Every Day Smoker     Packs/day: 0.50     Years: 7.00     Pack years: 3.50     Types: Cigarettes     Smokeless tobacco: Never Used   Substance Use Topics     Alcohol use: Yes     Comment: occas     {FAMILY HISTORY (Optional):166750383}  History   Drug Use No         Objective     /67 (BP Location: Right arm, Patient Position: Sitting, Cuff Size: Adult Regular)   Pulse 82   Ht 1.575 m (5' 2\")   Wt 48.1 kg (106 lb)   SpO2 99%   BMI 19.39 kg/m      Physical Exam  {EXAM Preop Choices:771410}    No results for input(s): HGB, PLT, INR, " "NA, POTASSIUM, CR, A1C in the last 90408 hours.     Diagnostics:  {LABS:042001}   {EK}    Revised Cardiac Risk Index (RCRI):  The patient has the following serious cardiovascular risks for perioperative complications:  {PREOP REVISED CARDIAC RISK INDEX (RCRI) :812267::\" - No serious cardiac risks = 0 points\"}     RCRI Interpretation: {REVISED CARDIAC RISK INTERPRETATION :249742}         Signed Electronically by: Marquita Somers NP  Copy of this evaluation report is provided to requesting physician.    {Provider Resources  Preop [facility] Preop Guidelines  Revised Cardiac Risk Index :972877}  "

## 2021-08-20 DIAGNOSIS — Z11.8 SPECIAL SCREENING EXAMINATION FOR CHLAMYDIAL DISEASE: Primary | ICD-10-CM

## 2021-08-20 LAB
C TRACH DNA SPEC QL PROBE+SIG AMP: ABNORMAL
N GONORRHOEA DNA SPEC QL NAA+PROBE: ABNORMAL

## 2021-08-23 ENCOUNTER — TRANSFERRED RECORDS (OUTPATIENT)
Dept: HEALTH INFORMATION MANAGEMENT | Facility: CLINIC | Age: 26
End: 2021-08-23

## 2021-08-23 ENCOUNTER — LAB (OUTPATIENT)
Dept: LAB | Facility: OTHER | Age: 26
End: 2021-08-23
Payer: COMMERCIAL

## 2021-08-23 DIAGNOSIS — Z11.8 SPECIAL SCREENING EXAMINATION FOR CHLAMYDIAL DISEASE: ICD-10-CM

## 2021-08-23 LAB
C TRACH DNA SPEC QL PROBE+SIG AMP: NEGATIVE
N GONORRHOEA DNA SPEC QL NAA+PROBE: NEGATIVE

## 2021-08-23 PROCEDURE — 87491 CHLMYD TRACH DNA AMP PROBE: CPT | Mod: ZL

## 2021-08-25 ENCOUNTER — ANESTHESIA (OUTPATIENT)
Dept: SURGERY | Facility: HOSPITAL | Age: 26
End: 2021-08-25
Payer: COMMERCIAL

## 2021-08-25 ENCOUNTER — HOSPITAL ENCOUNTER (OUTPATIENT)
Facility: HOSPITAL | Age: 26
Discharge: HOME | End: 2021-08-25
Attending: OBSTETRICS & GYNECOLOGY | Admitting: OBSTETRICS & GYNECOLOGY
Payer: COMMERCIAL

## 2021-08-25 ENCOUNTER — APPOINTMENT (OUTPATIENT)
Dept: LAB | Facility: HOSPITAL | Age: 26
End: 2021-08-25
Attending: OBSTETRICS & GYNECOLOGY
Payer: COMMERCIAL

## 2021-08-25 VITALS
HEIGHT: 62 IN | WEIGHT: 107 LBS | BODY MASS INDEX: 19.69 KG/M2 | DIASTOLIC BLOOD PRESSURE: 67 MMHG | SYSTOLIC BLOOD PRESSURE: 92 MMHG | OXYGEN SATURATION: 96 % | TEMPERATURE: 97 F | RESPIRATION RATE: 20 BRPM | HEART RATE: 67 BPM

## 2021-08-25 DIAGNOSIS — R87.613 HSIL (HIGH GRADE SQUAMOUS INTRAEPITHELIAL LESION) ON PAP SMEAR OF CERVIX: ICD-10-CM

## 2021-08-25 DIAGNOSIS — N93.9 ABNORMAL UTERINE BLEEDING: ICD-10-CM

## 2021-08-25 DIAGNOSIS — Z98.890 POST-OPERATIVE STATE: Primary | ICD-10-CM

## 2021-08-25 LAB — HCG UR QL: NEGATIVE

## 2021-08-25 PROCEDURE — 250N000011 HC RX IP 250 OP 636: Performed by: NURSE ANESTHETIST, CERTIFIED REGISTERED

## 2021-08-25 PROCEDURE — 250N000011 HC RX IP 250 OP 636: Performed by: OBSTETRICS & GYNECOLOGY

## 2021-08-25 PROCEDURE — 250N000009 HC RX 250: Performed by: OBSTETRICS & GYNECOLOGY

## 2021-08-25 PROCEDURE — 57522 CONIZATION OF CERVIX: CPT | Performed by: NURSE ANESTHETIST, CERTIFIED REGISTERED

## 2021-08-25 PROCEDURE — 250N000013 HC RX MED GY IP 250 OP 250 PS 637: Performed by: OBSTETRICS & GYNECOLOGY

## 2021-08-25 PROCEDURE — 710N000012 HC RECOVERY PHASE 2, PER MINUTE: Performed by: OBSTETRICS & GYNECOLOGY

## 2021-08-25 PROCEDURE — 57522 CONIZATION OF CERVIX: CPT | Performed by: OBSTETRICS & GYNECOLOGY

## 2021-08-25 PROCEDURE — 250N000009 HC RX 250: Performed by: NURSE ANESTHETIST, CERTIFIED REGISTERED

## 2021-08-25 PROCEDURE — 88305 TISSUE EXAM BY PATHOLOGIST: CPT | Mod: TC | Performed by: OBSTETRICS & GYNECOLOGY

## 2021-08-25 PROCEDURE — 88307 TISSUE EXAM BY PATHOLOGIST: CPT | Mod: TC | Performed by: OBSTETRICS & GYNECOLOGY

## 2021-08-25 PROCEDURE — 360N000075 HC SURGERY LEVEL 2, PER MIN: Performed by: OBSTETRICS & GYNECOLOGY

## 2021-08-25 PROCEDURE — 272N000001 HC OR GENERAL SUPPLY STERILE: Performed by: OBSTETRICS & GYNECOLOGY

## 2021-08-25 PROCEDURE — 258N000003 HC RX IP 258 OP 636: Performed by: NURSE ANESTHETIST, CERTIFIED REGISTERED

## 2021-08-25 PROCEDURE — 370N000017 HC ANESTHESIA TECHNICAL FEE, PER MIN: Performed by: OBSTETRICS & GYNECOLOGY

## 2021-08-25 PROCEDURE — 81025 URINE PREGNANCY TEST: CPT | Performed by: OBSTETRICS & GYNECOLOGY

## 2021-08-25 PROCEDURE — 999N000141 HC STATISTIC PRE-PROCEDURE NURSING ASSESSMENT: Performed by: OBSTETRICS & GYNECOLOGY

## 2021-08-25 RX ORDER — ONDANSETRON 4 MG/1
4 TABLET, ORALLY DISINTEGRATING ORAL EVERY 30 MIN PRN
Status: DISCONTINUED | OUTPATIENT
Start: 2021-08-25 | End: 2021-08-25 | Stop reason: HOSPADM

## 2021-08-25 RX ORDER — DEXAMETHASONE SODIUM PHOSPHATE 10 MG/ML
INJECTION, SOLUTION INTRAMUSCULAR; INTRAVENOUS PRN
Status: DISCONTINUED | OUTPATIENT
Start: 2021-08-25 | End: 2021-08-25

## 2021-08-25 RX ORDER — LIDOCAINE HYDROCHLORIDE 20 MG/ML
INJECTION, SOLUTION INFILTRATION; PERINEURAL PRN
Status: DISCONTINUED | OUTPATIENT
Start: 2021-08-25 | End: 2021-08-25

## 2021-08-25 RX ORDER — ONDANSETRON 2 MG/ML
4 INJECTION INTRAMUSCULAR; INTRAVENOUS EVERY 30 MIN PRN
Status: DISCONTINUED | OUTPATIENT
Start: 2021-08-25 | End: 2021-08-25 | Stop reason: HOSPADM

## 2021-08-25 RX ORDER — ONDANSETRON 4 MG/1
4 TABLET, ORALLY DISINTEGRATING ORAL
Status: DISCONTINUED | OUTPATIENT
Start: 2021-08-25 | End: 2021-08-25 | Stop reason: HOSPADM

## 2021-08-25 RX ORDER — ONDANSETRON 2 MG/ML
INJECTION INTRAMUSCULAR; INTRAVENOUS PRN
Status: DISCONTINUED | OUTPATIENT
Start: 2021-08-25 | End: 2021-08-25

## 2021-08-25 RX ORDER — KETOROLAC TROMETHAMINE 15 MG/ML
15 INJECTION, SOLUTION INTRAMUSCULAR; INTRAVENOUS ONCE
Status: COMPLETED | OUTPATIENT
Start: 2021-08-25 | End: 2021-08-25

## 2021-08-25 RX ORDER — LIDOCAINE 40 MG/G
CREAM TOPICAL
Status: DISCONTINUED | OUTPATIENT
Start: 2021-08-25 | End: 2021-08-25 | Stop reason: HOSPADM

## 2021-08-25 RX ORDER — IBUPROFEN 800 MG/1
800 TABLET, FILM COATED ORAL EVERY 8 HOURS PRN
Qty: 30 TABLET | Refills: 1 | Status: SHIPPED | OUTPATIENT
Start: 2021-08-25

## 2021-08-25 RX ORDER — PROPOFOL 10 MG/ML
INJECTION, EMULSION INTRAVENOUS PRN
Status: DISCONTINUED | OUTPATIENT
Start: 2021-08-25 | End: 2021-08-25

## 2021-08-25 RX ORDER — IODINE AND POTASSIUM IODIDE 50; 100 MG/ML; MG/ML
LIQUID ORAL PRN
Status: DISCONTINUED | OUTPATIENT
Start: 2021-08-25 | End: 2021-08-25 | Stop reason: HOSPADM

## 2021-08-25 RX ORDER — SODIUM CHLORIDE, SODIUM LACTATE, POTASSIUM CHLORIDE, CALCIUM CHLORIDE 600; 310; 30; 20 MG/100ML; MG/100ML; MG/100ML; MG/100ML
INJECTION, SOLUTION INTRAVENOUS CONTINUOUS
Status: DISCONTINUED | OUTPATIENT
Start: 2021-08-25 | End: 2021-08-25 | Stop reason: HOSPADM

## 2021-08-25 RX ORDER — NALOXONE HYDROCHLORIDE 0.4 MG/ML
0.2 INJECTION, SOLUTION INTRAMUSCULAR; INTRAVENOUS; SUBCUTANEOUS
Status: DISCONTINUED | OUTPATIENT
Start: 2021-08-25 | End: 2021-08-25 | Stop reason: HOSPADM

## 2021-08-25 RX ORDER — NALOXONE HYDROCHLORIDE 0.4 MG/ML
0.4 INJECTION, SOLUTION INTRAMUSCULAR; INTRAVENOUS; SUBCUTANEOUS
Status: DISCONTINUED | OUTPATIENT
Start: 2021-08-25 | End: 2021-08-25 | Stop reason: HOSPADM

## 2021-08-25 RX ORDER — OXYCODONE HYDROCHLORIDE 5 MG/1
5 TABLET ORAL EVERY 4 HOURS PRN
Status: DISCONTINUED | OUTPATIENT
Start: 2021-08-25 | End: 2021-08-25 | Stop reason: HOSPADM

## 2021-08-25 RX ORDER — LIDOCAINE HYDROCHLORIDE 10 MG/ML
INJECTION, SOLUTION INFILTRATION; PERINEURAL PRN
Status: DISCONTINUED | OUTPATIENT
Start: 2021-08-25 | End: 2021-08-25 | Stop reason: HOSPADM

## 2021-08-25 RX ORDER — OXYCODONE HYDROCHLORIDE 5 MG/1
5 TABLET ORAL
Status: DISCONTINUED | OUTPATIENT
Start: 2021-08-25 | End: 2021-08-25

## 2021-08-25 RX ORDER — ACETAMINOPHEN 325 MG/1
975 TABLET ORAL ONCE
Status: COMPLETED | OUTPATIENT
Start: 2021-08-25 | End: 2021-08-25

## 2021-08-25 RX ORDER — MEPERIDINE HYDROCHLORIDE 25 MG/ML
12.5 INJECTION INTRAMUSCULAR; INTRAVENOUS; SUBCUTANEOUS
Status: DISCONTINUED | OUTPATIENT
Start: 2021-08-25 | End: 2021-08-25 | Stop reason: HOSPADM

## 2021-08-25 RX ORDER — FENTANYL CITRATE 50 UG/ML
INJECTION, SOLUTION INTRAMUSCULAR; INTRAVENOUS PRN
Status: DISCONTINUED | OUTPATIENT
Start: 2021-08-25 | End: 2021-08-25

## 2021-08-25 RX ORDER — HYDROCODONE BITARTRATE AND ACETAMINOPHEN 5; 325 MG/1; MG/1
1-2 TABLET ORAL EVERY 4 HOURS PRN
Qty: 10 TABLET | Refills: 0 | Status: SHIPPED | OUTPATIENT
Start: 2021-08-25 | End: 2021-11-12

## 2021-08-25 RX ADMIN — PROPOFOL 50 MG: 10 INJECTION, EMULSION INTRAVENOUS at 11:18

## 2021-08-25 RX ADMIN — FENTANYL CITRATE 50 MCG: 50 INJECTION, SOLUTION INTRAMUSCULAR; INTRAVENOUS at 11:16

## 2021-08-25 RX ADMIN — PROPOFOL 50 MG: 10 INJECTION, EMULSION INTRAVENOUS at 11:30

## 2021-08-25 RX ADMIN — FENTANYL CITRATE 50 MCG: 50 INJECTION, SOLUTION INTRAMUSCULAR; INTRAVENOUS at 11:29

## 2021-08-25 RX ADMIN — KETOROLAC TROMETHAMINE 15 MG: 15 INJECTION, SOLUTION INTRAMUSCULAR; INTRAVENOUS at 09:53

## 2021-08-25 RX ADMIN — ACETAMINOPHEN 975 MG: 325 TABLET, FILM COATED ORAL at 09:54

## 2021-08-25 RX ADMIN — SODIUM CHLORIDE, POTASSIUM CHLORIDE, SODIUM LACTATE AND CALCIUM CHLORIDE 1000 ML: 600; 310; 30; 20 INJECTION, SOLUTION INTRAVENOUS at 09:55

## 2021-08-25 RX ADMIN — MIDAZOLAM 2 MG: 1 INJECTION INTRAMUSCULAR; INTRAVENOUS at 10:30

## 2021-08-25 RX ADMIN — DEXAMETHASONE SODIUM PHOSPHATE 6 MG: 10 INJECTION, SOLUTION INTRAMUSCULAR; INTRAVENOUS at 11:37

## 2021-08-25 RX ADMIN — MIDAZOLAM 2 MG: 1 INJECTION INTRAMUSCULAR; INTRAVENOUS at 11:11

## 2021-08-25 RX ADMIN — LIDOCAINE HYDROCHLORIDE 40 MG: 20 INJECTION, SOLUTION INFILTRATION; PERINEURAL at 11:16

## 2021-08-25 RX ADMIN — PROPOFOL 30 MG: 10 INJECTION, EMULSION INTRAVENOUS at 11:38

## 2021-08-25 RX ADMIN — PROPOFOL 50 MG: 10 INJECTION, EMULSION INTRAVENOUS at 11:24

## 2021-08-25 RX ADMIN — ONDANSETRON 4 MG: 2 INJECTION INTRAMUSCULAR; INTRAVENOUS at 11:36

## 2021-08-25 ASSESSMENT — MIFFLIN-ST. JEOR: SCORE: 1168.6

## 2021-08-25 NOTE — OP NOTE
[facility]  Operative Note    Pre-operative diagnosis: Abnormal uterine bleeding [N93.9]  HSIL (high grade squamous intraepithelial lesion) on Pap smear of cervix [R87.613]     Post-operative diagnosis Same, Pathology Pending     Procedure: Procedure(s):  DILATION AND CURETTAGE, UTERUS  LOOP ELECTROSURGICAL EXCISION PROCEDURE   ENDOCERVICAL CURETTAGE     Surgeon:  Assistant: Caleb Kulkarni MD     None     Anesthesia: Monitor Anesthesia Care, Local paracervical block      Estimated blood loss: Minimal       Drains: None     Specimens: LEEP cut at 3:00  LEEP endocervical pass.  ECC  Endometrial curettings  .    Findings: Nonstaining areas of cervical dysplasia with application of Lugol's solutions   Complications: None   Condition: Stable       OPERATIVE DICTATION:  The patientwas brought to the operating room and uneventfully placed under  anesthesia. She was prepped and draped in the dorsal lithotomy position and her bladder drained. A coated LEEP speculum was placed to visualize the cervix. The cervix was soaked with dilute acetic acid and then Lugol solution to identify the areas of dysplasia. A cervical/paracervical block was performed with 1% Lidocaine.   A 2 cm LEEP wand was chosen and using 50 bolton of pure cutting current, a LEEP procedure performed in 2 passes. ECC was performed.  The cervix was gently dilated with Flores dilators and sharp endometrial curettings were obtained from all 4 quadrants. Rollerball cautery was used at the margins and Monsel solution was applied to the cervical bed with resulting excellent hemostasis. The instruments were removed. There were no complications and the patient was transferred to the recovery room in excellent stable condition.   Caleb Kulkarni MD  8/25/2021  10:39 AM

## 2021-08-25 NOTE — DISCHARGE INSTRUCTIONS
Post-Anesthesia Patient Instructions    IMMEDIATELY FOLLOWING SURGERY:  Do not drive or operate machinery for the first twenty four hours after surgery.  Do not make any important decisions for twenty four hours after surgery or while taking narcotic pain medications or sedatives.  If you develop intractable nausea and vomiting or a severe headache please notify your doctor immediately.    FOLLOW-UP:  Please make an appointment with your surgeon as instructed. You do not need to follow up with anesthesia unless specifically instructed to do so.    WOUND CARE INSTRUCTIONS (if applicable):  Keep a dry clean dressing on the anesthesia/puncture wound site if there is drainage.  Once the wound has quit draining you may leave it open to air.  Generally you should leave the bandage intact for twenty four hours unless there is drainage.  If the epidural site drains for more than 36-48 hours please call the anesthesia department.    QUESTIONS?:  Please feel free to call your physician or the hospital  if you have any questions, and they will be happy to assist you.   Call MD prior to DC.  No driving today or while on pain medications  Pelvic rest for 4 weeks  Call Dr. Kulkarni [phone] as necessary if problems, no post op appt needed.

## 2021-08-25 NOTE — ANESTHESIA POSTPROCEDURE EVALUATION
Patient: Gerald Evans    Procedure(s):  DILATION AND CURETTAGE, UTERUS  LOOP ELECTROSURGICAL EXCISION PROCEDURE    Diagnosis:Abnormal uterine bleeding [N93.9]  HSIL (high grade squamous intraepithelial lesion) on Pap smear of cervix [R87.613]  Diagnosis Additional Information: No value filed.    Anesthesia Type:  MAC    Note:  Disposition: Outpatient   Postop Pain Control: Uneventful            Sign Out: Well controlled pain   PONV: No   Neuro/Psych: Uneventful            Sign Out: Acceptable/Baseline neuro status   Airway/Respiratory: Uneventful            Sign Out: Acceptable/Baseline resp. status   CV/Hemodynamics: Uneventful            Sign Out: Acceptable CV status; No obvious hypovolemia; No obvious fluid overload   Other NRE: NONE   DID A NON-ROUTINE EVENT OCCUR? No           Last vitals:  Vitals Value Taken Time   BP 92/67 08/25/21 1230   Temp 97  F (36.1  C) 08/25/21 1152   Pulse 67 08/25/21 1230   Resp 20 08/25/21 1230   SpO2 92 % 08/25/21 1232   Vitals shown include unvalidated device data.    Electronically Signed By: ELLIOTT White CRNA  August 25, 2021  12:46 PM

## 2021-08-25 NOTE — OR NURSING
Discharge instructions given to patient and patient's spouse. No questions.  Wheeled out of unit. Denies pain, nausea or dizziness. Valerie 19/20.

## 2021-08-25 NOTE — ANESTHESIA CARE TRANSFER NOTE
Patient: Gerald Evans    Procedure(s):  DILATION AND CURETTAGE, UTERUS  LOOP ELECTROSURGICAL EXCISION PROCEDURE    Diagnosis: Abnormal uterine bleeding [N93.9]  HSIL (high grade squamous intraepithelial lesion) on Pap smear of cervix [R87.613]  Diagnosis Additional Information: No value filed.    Anesthesia Type:   MAC     Note:    Oropharynx: oropharynx clear of all foreign objects  Level of Consciousness: drowsy  Oxygen Supplementation: nasal cannula  Level of Supplemental Oxygen (L/min / FiO2): 2  Independent Airway: airway patency satisfactory and stable  Dentition: dentition unchanged  Vital Signs Stable: post-procedure vital signs reviewed and stable  Report to RN Given: handoff report given  Patient transferred to: Phase II    Handoff Report: Identifed the Patient, Identified the Reponsible Provider, Reviewed the pertinent medical history, Discussed the surgical course, Reviewed Intra-OP anesthesia mangement and issues during anesthesia, Set expectations for post-procedure period and Allowed opportunity for questions and acknowledgement of understanding      Vitals:  Vitals Value Taken Time   BP     Temp     Pulse     Resp     SpO2         Electronically Signed By: ELLIOTT Peraza [facility]  August 25, 2021  11:55 AM

## 2021-08-31 LAB
PATH REPORT.COMMENTS IMP SPEC: NORMAL
PATH REPORT.FINAL DX SPEC: NORMAL
PATH REPORT.GROSS SPEC: NORMAL
PATH REPORT.MICROSCOPIC SPEC OTHER STN: NORMAL
PATH REPORT.RELEVANT HX SPEC: NORMAL
PHOTO IMAGE: NORMAL

## 2021-08-31 PROCEDURE — 88305 TISSUE EXAM BY PATHOLOGIST: CPT | Mod: 26 | Performed by: PATHOLOGY

## 2021-08-31 PROCEDURE — 88307 TISSUE EXAM BY PATHOLOGIST: CPT | Mod: 26 | Performed by: PATHOLOGY

## 2021-09-12 ENCOUNTER — HEALTH MAINTENANCE LETTER (OUTPATIENT)
Age: 26
End: 2021-09-12

## 2021-11-12 ENCOUNTER — OFFICE VISIT (OUTPATIENT)
Dept: OBGYN | Facility: OTHER | Age: 26
End: 2021-11-12
Attending: OBSTETRICS & GYNECOLOGY
Payer: COMMERCIAL

## 2021-11-12 VITALS
DIASTOLIC BLOOD PRESSURE: 54 MMHG | SYSTOLIC BLOOD PRESSURE: 96 MMHG | OXYGEN SATURATION: 99 % | HEART RATE: 91 BPM | HEIGHT: 62 IN | BODY MASS INDEX: 19.47 KG/M2 | WEIGHT: 105.8 LBS

## 2021-11-12 DIAGNOSIS — N94.6 DYSMENORRHEA: ICD-10-CM

## 2021-11-12 DIAGNOSIS — D06.9 SEVERE DYSPLASIA OF CERVIX: Primary | ICD-10-CM

## 2021-11-12 DIAGNOSIS — B96.89 BV (BACTERIAL VAGINOSIS): Primary | ICD-10-CM

## 2021-11-12 DIAGNOSIS — N76.0 BV (BACTERIAL VAGINOSIS): Primary | ICD-10-CM

## 2021-11-12 DIAGNOSIS — N92.0 MENORRHAGIA WITH REGULAR CYCLE: ICD-10-CM

## 2021-11-12 DIAGNOSIS — N94.10 FEMALE DYSPAREUNIA: ICD-10-CM

## 2021-11-12 DIAGNOSIS — Z20.2 POSSIBLE EXPOSURE TO STD: ICD-10-CM

## 2021-11-12 LAB
CLUE CELLS: PRESENT
TRICHOMONAS, WET PREP: ABNORMAL
WBC'S/HIGH POWER FIELD, WET PREP: ABNORMAL
YEAST, WET PREP: ABNORMAL

## 2021-11-12 PROCEDURE — 87624 HPV HI-RISK TYP POOLED RSLT: CPT | Mod: ZL | Performed by: OBSTETRICS & GYNECOLOGY

## 2021-11-12 PROCEDURE — 87491 CHLMYD TRACH DNA AMP PROBE: CPT | Mod: ZL | Performed by: OBSTETRICS & GYNECOLOGY

## 2021-11-12 PROCEDURE — G0463 HOSPITAL OUTPT CLINIC VISIT: HCPCS | Mod: 25

## 2021-11-12 PROCEDURE — G0463 HOSPITAL OUTPT CLINIC VISIT: HCPCS

## 2021-11-12 PROCEDURE — 87210 SMEAR WET MOUNT SALINE/INK: CPT | Mod: ZL | Performed by: OBSTETRICS & GYNECOLOGY

## 2021-11-12 PROCEDURE — 88342 IMHCHEM/IMCYTCHM 1ST ANTB: CPT | Mod: TC | Performed by: OBSTETRICS & GYNECOLOGY

## 2021-11-12 PROCEDURE — 88175 CYTOPATH C/V AUTO FLUID REDO: CPT | Mod: ZL | Performed by: OBSTETRICS & GYNECOLOGY

## 2021-11-12 PROCEDURE — 99024 POSTOP FOLLOW-UP VISIT: CPT | Performed by: OBSTETRICS & GYNECOLOGY

## 2021-11-12 RX ORDER — SERTRALINE HYDROCHLORIDE 25 MG/1
25 TABLET, FILM COATED ORAL DAILY
Status: ON HOLD | COMMUNITY
End: 2022-03-16

## 2021-11-12 RX ORDER — METRONIDAZOLE 7.5 MG/G
1 GEL VAGINAL DAILY
Qty: 70 G | Refills: 0 | Status: SHIPPED | OUTPATIENT
Start: 2021-11-12 | End: 2021-11-19

## 2021-11-12 ASSESSMENT — MIFFLIN-ST. JEOR: SCORE: 1163.16

## 2021-11-12 ASSESSMENT — PAIN SCALES - GENERAL: PAINLEVEL: NO PAIN (0)

## 2021-11-12 NOTE — PROGRESS NOTES
CC: F/u cervical dysplasia and AUB  HPI:  Gerald Evans is a 26 year old female P2 (vag). Patient's last menstrual period was 2021 (exact date)..  Menses are Regular lasting 6 days with 5 of heavy flow.  Her menstrual flow is limiting her clothing choices and interferes with lifestyle/activites.   S/p LEEP 21 showing high grade dysplasia (HERRERA 2 and 3).  Pt requests cervical STD testing (exposure).    Clots: Yes  Intermenstrual bleeding: No  Post-coital bleeding: No  Previous work-up:Yes, Nml US and endometrial biopsy  Contraception: vas    Dysmenorrhea: Yes starting 1-2 days prior to menses  Pelvic pain:No  Dyspareunia: Yes    Past GYN history:        Last PAP smear:  HSIL    Patients records are available and reviewed at today's visit.    No past medical history on file.    Past Surgical History:   Procedure Laterality Date     BIOPSY OF SKIN LESION        SECTION  2016    [facility].     COLPOSCOPY CERVIX, LOOP ELECTRODE BIOPSY, COMBINED N/A 2021    Procedure: LOOP ELECTROSURGICAL EXCISION PROCEDURE;  Surgeon: Caleb Kulkarni MD;  Location: [facility]     DILATION AND CURETTAGE N/A 2021    Procedure: DILATION AND CURETTAGE, UTERUS;  Surgeon: Caleb Kulkarni MD;  Location: [facility]     ENT SURGERY      adenoidectomy     ENT SURGERY      anesthesia for tooth work     LAPAROSCOPIC SALPINGECTOMY Left 2014    Procedure: LAPAROSCOPIC SALPINGECTOMY;  Surgeon: Leonel Bethea MD;  Location: [facility] OR       Family History   Problem Relation Age of Onset     Heart Disease Mother         stent placement     C.A.D. Mother      Diabetes Paternal Grandmother      Hypertension Paternal Grandmother        Current Outpatient Medications   Medication Sig Dispense Refill     lamoTRIgine (LAMICTAL) 100 MG tablet TK 1 T PO  D       LORazepam (ATIVAN) 0.5 MG tablet        ondansetron (ZOFRAN) 8 MG tablet Take 8 mg by mouth every 8 hours as needed for nausea       sertraline (ZOLOFT) 25 MG tablet  "Take 25 mg by mouth daily       ibuprofen (ADVIL/MOTRIN) 800 MG tablet Take 1 tablet (800 mg) by mouth every 8 hours as needed for pain (Patient not taking: Reported on 11/12/2021) 30 tablet 1       Allergies: Adderall, Amphetamine aspartate, Amphetamine sulfate, Amphetamine-dextroamphetamine, Dextroamphetamine, Strawberry, and Buspar [buspirone]    ROS:  CONSTITUTIONAL: NEGATIVE for fever, chills, change in weight  GI: NEGATIVE for nausea, abdominal pain, heartburn, or change in bowel habits  : NEGATIVE for frequency, dysuria, hematuria, vaginal discharge      EXAM:  Blood pressure 96/54, pulse 91, height 1.575 m (5' 2\"), weight 48 kg (105 lb 12.8 oz), last menstrual period 11/04/2021, SpO2 99 %, unknown if currently breastfeeding.   BMI= Body mass index is 19.35 kg/m .  General - pleasant female in no acute distress.  Abdomen - soft, nontender, nondistended, no hepatosplenomegaly.  Pelvic - EG: normal adult female, BUS: within normal limits, Vagina: well rugated, no discharge, Cervix: no lesions or CMT, Uterus: firm,  normal sized and nontender, Adnexae: no masses or tenderness.  Rectovaginal - deferred.  Musculoskeletal - no gross deformities or edema  Neurological - normal mental status.  Pap/ECC/cervical cx and wet prep done    ASSESSMENT/PLAN:  (D06.9) Severe dysplasia of cervix  (primary encounter diagnosis)  Comment:  S/p LEEP.  Surveillanc pap smear  Plan: Surgical Pathology Exam, A pap thin layer         diagnostic with HPV           (Z20.2) Possible exposure to STD  Plan: Chlamydia trachomatis/Neisseria gonorrhoeae by         PCR, Wet preparation           Menorrhagia, dysmenorrhea, dyspareunia.     Pt desiring definitive management (hysterectomy)  Discussed expectant,  medical, IUD and and surgical options(endometrial ablation/hysterectomy). Will plan to see pt back for f/u aptt for results and POC.  If no evidence of malignancy will discuss  LAVH/BS.            "

## 2021-11-12 NOTE — NURSING NOTE
"Chief Complaint   Patient presents with     Repeat Pap Smear     follow up pap and ECC       Initial BP 96/54 (BP Location: Left arm, Patient Position: Sitting, Cuff Size: Adult Regular)   Pulse 91   Ht 1.575 m (5' 2\")   Wt 48 kg (105 lb 12.8 oz)   LMP 11/04/2021 (Exact Date)   SpO2 99%   BMI 19.35 kg/m   Estimated body mass index is 19.35 kg/m  as calculated from the following:    Height as of this encounter: 1.575 m (5' 2\").    Weight as of this encounter: 48 kg (105 lb 12.8 oz).  Medication Reconciliation: complete  MARKOS CHARLES LPN  "

## 2021-11-13 LAB
C TRACH DNA SPEC QL PROBE+SIG AMP: NEGATIVE
N GONORRHOEA DNA SPEC QL NAA+PROBE: NEGATIVE

## 2021-11-15 ENCOUNTER — MYC MEDICAL ADVICE (OUTPATIENT)
Dept: OBGYN | Facility: OTHER | Age: 26
End: 2021-11-15
Payer: COMMERCIAL

## 2021-11-15 DIAGNOSIS — N76.0 BV (BACTERIAL VAGINOSIS): Primary | ICD-10-CM

## 2021-11-15 DIAGNOSIS — B96.89 BV (BACTERIAL VAGINOSIS): Primary | ICD-10-CM

## 2021-11-16 RX ORDER — CLINDAMYCIN PHOSPHATE 20 MG/G
1 CREAM VAGINAL AT BEDTIME
Qty: 40 G | Refills: 0 | Status: SHIPPED | OUTPATIENT
Start: 2021-11-16 | End: 2021-11-23

## 2021-11-17 LAB
BKR LAB AP GYN ADEQUACY: NORMAL
BKR LAB AP GYN INTERPRETATION: NORMAL
BKR LAB AP HPV REFLEX: NORMAL
BKR LAB AP LMP: NORMAL
BKR LAB AP PREVIOUS ABNL DX: NORMAL
BKR LAB AP PREVIOUS ABNORMAL: NORMAL
PATH REPORT.COMMENTS IMP SPEC: NORMAL
PATH REPORT.COMMENTS IMP SPEC: NORMAL
PATH REPORT.RELEVANT HX SPEC: NORMAL

## 2021-11-19 LAB
HUMAN PAPILLOMA VIRUS 16 DNA: NEGATIVE
HUMAN PAPILLOMA VIRUS 18 DNA: NEGATIVE
HUMAN PAPILLOMA VIRUS FINAL DIAGNOSIS: NORMAL
HUMAN PAPILLOMA VIRUS OTHER HR: NEGATIVE
PATH REPORT.COMMENTS IMP SPEC: NORMAL
PATH REPORT.COMMENTS IMP SPEC: NORMAL
PATH REPORT.FINAL DX SPEC: NORMAL
PATH REPORT.GROSS SPEC: NORMAL
PATH REPORT.MICROSCOPIC SPEC OTHER STN: NORMAL
PATH REPORT.RELEVANT HX SPEC: NORMAL
PHOTO IMAGE: NORMAL

## 2021-11-19 PROCEDURE — 88342 IMHCHEM/IMCYTCHM 1ST ANTB: CPT | Mod: 26 | Performed by: PATHOLOGY

## 2021-11-19 PROCEDURE — 88305 TISSUE EXAM BY PATHOLOGIST: CPT | Mod: 26 | Performed by: PATHOLOGY

## 2021-12-03 ENCOUNTER — OFFICE VISIT (OUTPATIENT)
Dept: OBGYN | Facility: OTHER | Age: 26
End: 2021-12-03
Attending: OBSTETRICS & GYNECOLOGY
Payer: COMMERCIAL

## 2021-12-03 VITALS
WEIGHT: 105.8 LBS | SYSTOLIC BLOOD PRESSURE: 102 MMHG | HEART RATE: 90 BPM | BODY MASS INDEX: 19.47 KG/M2 | HEIGHT: 62 IN | DIASTOLIC BLOOD PRESSURE: 60 MMHG | RESPIRATION RATE: 99 BRPM

## 2021-12-03 DIAGNOSIS — N94.6 DYSMENORRHEA: ICD-10-CM

## 2021-12-03 DIAGNOSIS — N92.0 MENORRHAGIA WITH REGULAR CYCLE: Primary | ICD-10-CM

## 2021-12-03 PROCEDURE — G0463 HOSPITAL OUTPT CLINIC VISIT: HCPCS

## 2021-12-03 PROCEDURE — 99214 OFFICE O/P EST MOD 30 MIN: CPT | Mod: 57 | Performed by: OBSTETRICS & GYNECOLOGY

## 2021-12-03 RX ORDER — DESOGESTREL AND ETHINYL ESTRADIOL 0.15-0.03
1 KIT ORAL DAILY
Qty: 81 TABLET | Refills: 1 | Status: ON HOLD | OUTPATIENT
Start: 2021-12-03 | End: 2022-03-16

## 2021-12-03 ASSESSMENT — MIFFLIN-ST. JEOR: SCORE: 1163.16

## 2021-12-03 ASSESSMENT — PAIN SCALES - GENERAL: PAINLEVEL: NO PAIN (0)

## 2021-12-03 NOTE — NURSING NOTE
"Chief Complaint   Patient presents with     Follow Up     abnormal uterine bleeding       Initial /60 (BP Location: Left arm, Patient Position: Sitting, Cuff Size: Adult Regular)   Pulse 90   Resp (!) 99   Ht 1.575 m (5' 2\")   Wt 48 kg (105 lb 12.8 oz)   LMP 11/04/2021 (Exact Date)   BMI 19.35 kg/m   Estimated body mass index is 19.35 kg/m  as calculated from the following:    Height as of this encounter: 1.575 m (5' 2\").    Weight as of this encounter: 48 kg (105 lb 12.8 oz).  Medication Reconciliation: complete  MARKOS CHARLES LPN  "

## 2021-12-05 NOTE — PROGRESS NOTES
CC:  F/u  Menorrhagia, dysmenorrhea, and cervical dysplasia.   HPI:  Gerald Evans is a 26 year old female P3 (CS twins, )). Patient's last menstrual period was 2021 (exact date)..  Menses are Regular lasting 6 days with 5 of heavy flow.  Her menstrual flow is limiting her clothing choices and interferes with lifestyle/activites. She has previously tried the pill/IUD/NSAIDS without improvement.   S/p LEEP 21 showing high grade dysplasia (HERRERA 2 and 3).  Recent f/u Pap and ECC wnl.     Clots: Yes  Intermenstrual bleeding: No  Post-coital bleeding: No  Previous work-up:Yes  Contraception: vas  Dysmenorrhea: Yes  Pelvic pain:No  Dyspareunia: Yes    Patients records are available and reviewed at today's visit.    No past medical history on file.    Past Surgical History:   Procedure Laterality Date     BIOPSY OF SKIN LESION        SECTION  2016    [facility].     COLPOSCOPY CERVIX, LOOP ELECTRODE BIOPSY, COMBINED N/A 2021    Procedure: LOOP ELECTROSURGICAL EXCISION PROCEDURE;  Surgeon: Caleb Kulkarni MD;  Location: [facility]     DILATION AND CURETTAGE N/A 2021    Procedure: DILATION AND CURETTAGE, UTERUS;  Surgeon: Caleb Kulkarni MD;  Location: [facility]     ENT SURGERY      adenoidectomy     ENT SURGERY      anesthesia for tooth work     LAPAROSCOPIC SALPINGECTOMY Left 2014    Procedure: LAPAROSCOPIC SALPINGECTOMY;  Surgeon: Leonel Bethea MD;  Location: [facility]       Family History   Problem Relation Age of Onset     Heart Disease Mother         stent placement     C.A.D. Mother      Diabetes Paternal Grandmother      Hypertension Paternal Grandmother        Current Outpatient Medications   Medication Sig Dispense Refill     desogestrel-ethinyl estradiol (APRI) 0.15-30 MG-MCG tablet Take 1 tablet by mouth daily 81 tablet 1     lamoTRIgine (LAMICTAL) 100 MG tablet 200 mg        LORazepam (ATIVAN) 0.5 MG tablet        ondansetron (ZOFRAN) 8 MG tablet Take 8 mg by mouth every  "8 hours as needed for nausea       sertraline (ZOLOFT) 25 MG tablet Take 25 mg by mouth daily       ibuprofen (ADVIL/MOTRIN) 800 MG tablet Take 1 tablet (800 mg) by mouth every 8 hours as needed for pain (Patient not taking: Reported on 11/12/2021) 30 tablet 1       Allergies: Adderall, Amphetamine aspartate, Amphetamine sulfate, Amphetamine-dextroamphetamine, Dextroamphetamine, Strawberry, Buspar [buspirone], and Metrogel [metronidazole]    ROS:  CONSTITUTIONAL: NEGATIVE for fever, chills, change in weight  RESP: NEGATIVE for significant cough or SOB  CV: NEGATIVE for chest pain, palpitations or peripheral edema  GI: NEGATIVE for nausea, abdominal pain, heartburn, or change in bowel habits  : NEGATIVE for frequency, dysuria, hematuria, vaginal discharge  PSYCHIATRIC: NEGATIVE for changes in mood or affect    EXAM:  Blood pressure 102/60, pulse 90, resp. rate (!) 99, height 1.575 m (5' 2\"), weight 48 kg (105 lb 12.8 oz), last menstrual period 11/04/2021, unknown if currently breastfeeding.   BMI= Body mass index is 19.35 kg/m .  General - pleasant female in no acute distress..  Rectovaginal - deferred.  Musculoskeletal - no gross deformities or edema  Neurological - normal mental status.      ASSESSMENT/PLAN:  (N92.0) Menorrhagia with regular cycle  (primary encounter diagnosis)  Comment: with dysmenorrhea.   Done with childbearing.  Failed conservative medical treatment. Desires definitive management.   Plan: Discussed expectant,  medical, IUD and and surgical options(endometrial ablation/hysterectomy). Patient would like to proceed with hysterectomy.  Plan LAVH with right salpingectomy (left previously removed).  Reviewed goals, risks, alternatives for planned procedure.  Including risk of bleeding, infection, damage to nerves, blood vessels, bowel and bladder. Discussed recovery period and expected discomfort.. All questions were answered.  Unable to schedule currently due to Covid/bed availability.  Will call " to schedule.   Pt has my card and phone number to call as needed if problems in the interim or she does not hear by January.  Plan OCP's in the interim.

## 2022-01-12 ENCOUNTER — HOSPITAL ENCOUNTER (EMERGENCY)
Facility: HOSPITAL | Age: 27
Discharge: LEFT BEFORE DISCHARGE | End: 2022-01-12
Admitting: EMERGENCY MEDICINE
Payer: COMMERCIAL

## 2022-01-12 VITALS
TEMPERATURE: 97.2 F | OXYGEN SATURATION: 99 % | DIASTOLIC BLOOD PRESSURE: 90 MMHG | HEART RATE: 85 BPM | SYSTOLIC BLOOD PRESSURE: 122 MMHG | RESPIRATION RATE: 16 BRPM

## 2022-01-12 PROCEDURE — 999N000104 HC STATISTIC NO CHARGE

## 2022-02-24 ENCOUNTER — PREP FOR PROCEDURE (OUTPATIENT)
Dept: OBGYN | Facility: OTHER | Age: 27
End: 2022-02-24
Payer: COMMERCIAL

## 2022-02-24 DIAGNOSIS — N94.6 DYSMENORRHEA: ICD-10-CM

## 2022-02-24 DIAGNOSIS — N87.9 CERVICAL DYSPLASIA: ICD-10-CM

## 2022-02-24 DIAGNOSIS — Z01.812 ENCOUNTER FOR PREOPERATIVE SCREENING LABORATORY TESTING FOR COVID-19 VIRUS: ICD-10-CM

## 2022-02-24 DIAGNOSIS — N92.0 MENORRHAGIA: Primary | ICD-10-CM

## 2022-02-24 DIAGNOSIS — Z11.52 ENCOUNTER FOR PREOPERATIVE SCREENING LABORATORY TESTING FOR COVID-19 VIRUS: ICD-10-CM

## 2022-03-09 ENCOUNTER — ANESTHESIA EVENT (OUTPATIENT)
Dept: SURGERY | Facility: HOSPITAL | Age: 27
End: 2022-03-09
Payer: COMMERCIAL

## 2022-03-09 ASSESSMENT — LIFESTYLE VARIABLES: TOBACCO_USE: 1

## 2022-03-09 NOTE — ANESTHESIA PREPROCEDURE EVALUATION
Anesthesia Pre-Procedure Evaluation    Patient: Gerald Evans   MRN: [id number] : 1995        Procedure : Procedure(s):  LAPAROSCOPIC ASSISTED VAGINAL HYSTERECTOMY  RIGHT LAPAROSCOPIC SALPINGECTOMY          No past medical history on file.   Past Surgical History:   Procedure Laterality Date     BIOPSY OF SKIN LESION  2013      SECTION  2016    [facility].     COLPOSCOPY CERVIX, LOOP ELECTRODE BIOPSY, COMBINED N/A 2021    Procedure: LOOP ELECTROSURGICAL EXCISION PROCEDURE;  Surgeon: Caleb Kulkarni MD;  Location: [facility]     DILATION AND CURETTAGE N/A 2021    Procedure: DILATION AND CURETTAGE, UTERUS;  Surgeon: Caleb Kulkarni MD;  Location: [facility]     ENT SURGERY      adenoidectomy     ENT SURGERY      anesthesia for tooth work     LAPAROSCOPIC SALPINGECTOMY Left 2014    Procedure: LAPAROSCOPIC SALPINGECTOMY;  Surgeon: Leonel Bethea MD;  Location: [facility]      Allergies   Allergen Reactions     Adderall      Amphetamine Aspartate Other (See Comments)     Aggressive  Adderall       Amphetamine Sulfate Other (See Comments)     Aggressive  Adderall     Amphetamine-Dextroamphetamine      Other reaction(s): Other - Describe In Comment Field  Gets violent     Dextroamphetamine Other (See Comments)     Aggressive  Adderall     Strawberry      Buspar [Buspirone] Rash     Metrogel [Metronidazole] Rash      Social History     Tobacco Use     Smoking status: Current Every Day Smoker     Packs/day: 1.00     Years: 7.00     Pack years: 7.00     Types: Cigarettes     Smokeless tobacco: Never Used   Substance Use Topics     Alcohol use: Yes     Comment: occas      Wt Readings from Last 1 Encounters:   21 48 kg (105 lb 12.8 oz)        Anesthesia Evaluation   Pt has had prior anesthetic. Type: General and MAC.    No history of anesthetic complications       ROS/MED HX  ENT/Pulmonary: Comment: Pt states she thinks she has a sinus infection-can feel the pressure, no drainage.    (+)  allergic rhinitis, tobacco use, Current use, 1 packs/day, Intermittent, asthma     Neurologic:     (+) migraines,     Cardiovascular:  - neg cardiovascular ROS     METS/Exercise Tolerance: >4 METS    Hematologic:  - neg hematologic  ROS     Musculoskeletal:  - neg musculoskeletal ROS     GI/Hepatic:     (+) GERD, Asymptomatic on medication,     Renal/Genitourinary:  - neg Renal ROS     Endo:  - neg endo ROS     Psychiatric/Substance Use: Comment: History of alcoholism (H)  Attention deficit hyperactivity disorder (ADHD)  Oppositional defiant disorder     Panic disorder without agoraphobia        (+) psychiatric history anxiety and depression alcohol abuse     Infectious Disease:  - neg infectious disease ROS     Malignancy:  - neg malignancy ROS     Other:  - neg other ROS          Physical Exam    Airway        Mallampati: III   TM distance: > 3 FB   Neck ROM: full   Mouth opening: < 3 cm    Respiratory Devices and Support         Dental       (+) chipped      Cardiovascular   cardiovascular exam normal       Rhythm and rate: regular and normal     Pulmonary   pulmonary exam normal        breath sounds clear to auscultation           OUTSIDE LABS:  CBC:   Lab Results   Component Value Date    WBC 11.6 (H) 01/03/2019    WBC 7.1 08/14/2018    HGB 10.7 (L) 01/03/2019    HGB 13.1 08/14/2018    HCT 31.5 (L) 01/03/2019    HCT 37.9 08/14/2018     01/03/2019     08/14/2018     BMP:   Lab Results   Component Value Date     05/25/2017     12/27/2014    POTASSIUM 3.5 05/25/2017    POTASSIUM 3.9 12/27/2014    CHLORIDE 107 05/25/2017    CHLORIDE 106 12/27/2014    CO2 27 05/25/2017    CO2 25 12/27/2014    BUN 11 05/25/2017    BUN 12 12/27/2014    CR 0.68 05/25/2017    CR 0.66 12/27/2014    GLC 87 05/25/2017    GLC 99 12/27/2014     COAGS: No results found for: PTT, INR, FIBR  POC:   Lab Results   Component Value Date    HCG Negative 08/25/2021    HCGS Negative 02/24/2014     HEPATIC:   Lab Results    Component Value Date    ALBUMIN 3.8 05/25/2017    PROTTOTAL 7.1 05/25/2017    ALT 12 05/25/2017    AST 7 05/25/2017    ALKPHOS 78 05/25/2017    BILITOTAL 0.2 05/25/2017     OTHER:   Lab Results   Component Value Date    JUNI 8.7 05/25/2017    TSH 0.74 08/17/2015    CRP <2.9 12/27/2014       Anesthesia Plan    ASA Status:  3   NPO Status:  NPO Appropriate    Anesthesia Type: General.     - Airway: ETT              Consents    Anesthesia Plan(s) and associated risks, benefits, and realistic alternatives discussed. Questions answered and patient/representative(s) expressed understanding.     - Discussed: Risks, Benefits and Alternatives for BOTH SEDATION and the PROCEDURE were discussed     - Discussed with:  Patient, Spouse      - Extended Intubation/Ventilatory Support Discussed: No.      - Patient is DNR/DNI Status: No    Use of blood products discussed: No .     Postoperative Care    Pain management: IV analgesics, Oral pain medications, Multi-modal analgesia, Peripheral nerve block (Single Shot).   PONV prophylaxis: Ondansetron (or other 5HT-3), Dexamethasone or Solumedrol     Comments:                ELLIOTT Okeefe CRNA

## 2022-03-12 ENCOUNTER — OFFICE VISIT (OUTPATIENT)
Dept: FAMILY MEDICINE | Facility: OTHER | Age: 27
End: 2022-03-12
Attending: FAMILY MEDICINE
Payer: COMMERCIAL

## 2022-03-12 DIAGNOSIS — Z11.52 ENCOUNTER FOR PREOPERATIVE SCREENING LABORATORY TESTING FOR COVID-19 VIRUS: ICD-10-CM

## 2022-03-12 DIAGNOSIS — Z01.812 ENCOUNTER FOR PREOPERATIVE SCREENING LABORATORY TESTING FOR COVID-19 VIRUS: ICD-10-CM

## 2022-03-12 PROCEDURE — U0003 INFECTIOUS AGENT DETECTION BY NUCLEIC ACID (DNA OR RNA); SEVERE ACUTE RESPIRATORY SYNDROME CORONAVIRUS 2 (SARS-COV-2) (CORONAVIRUS DISEASE [COVID-19]), AMPLIFIED PROBE TECHNIQUE, MAKING USE OF HIGH THROUGHPUT TECHNOLOGIES AS DESCRIBED BY CMS-2020-01-R: HCPCS | Mod: ZL

## 2022-03-13 LAB — SARS-COV-2 RNA RESP QL NAA+PROBE: NEGATIVE

## 2022-03-15 RX ORDER — BUTALBITAL/ACETAMINOPHEN 50MG-325MG
1 TABLET ORAL EVERY 4 HOURS PRN
Status: ON HOLD | COMMUNITY
End: 2022-03-16

## 2022-03-16 ENCOUNTER — ANESTHESIA (OUTPATIENT)
Dept: SURGERY | Facility: HOSPITAL | Age: 27
End: 2022-03-16
Payer: COMMERCIAL

## 2022-03-16 ENCOUNTER — APPOINTMENT (OUTPATIENT)
Dept: LAB | Facility: HOSPITAL | Age: 27
End: 2022-03-16
Attending: OBSTETRICS & GYNECOLOGY
Payer: COMMERCIAL

## 2022-03-16 ENCOUNTER — HOSPITAL ENCOUNTER (OUTPATIENT)
Facility: HOSPITAL | Age: 27
Discharge: HOME | End: 2022-03-16
Attending: OBSTETRICS & GYNECOLOGY | Admitting: OBSTETRICS & GYNECOLOGY
Payer: COMMERCIAL

## 2022-03-16 VITALS
WEIGHT: 102 LBS | HEIGHT: 62 IN | SYSTOLIC BLOOD PRESSURE: 115 MMHG | OXYGEN SATURATION: 98 % | TEMPERATURE: 97.8 F | HEART RATE: 84 BPM | BODY MASS INDEX: 18.77 KG/M2 | RESPIRATION RATE: 16 BRPM | DIASTOLIC BLOOD PRESSURE: 58 MMHG

## 2022-03-16 DIAGNOSIS — N87.9 CERVICAL DYSPLASIA: ICD-10-CM

## 2022-03-16 DIAGNOSIS — Z98.890 POST-OPERATIVE STATE: Primary | ICD-10-CM

## 2022-03-16 DIAGNOSIS — N92.0 MENORRHAGIA: ICD-10-CM

## 2022-03-16 DIAGNOSIS — N94.6 DYSMENORRHEA: ICD-10-CM

## 2022-03-16 LAB
ABO/RH(D): NORMAL
ANTIBODY SCREEN: NEGATIVE
HCG UR QL: NEGATIVE
SPECIMEN EXPIRATION DATE: NORMAL

## 2022-03-16 PROCEDURE — 36415 COLL VENOUS BLD VENIPUNCTURE: CPT | Performed by: OBSTETRICS & GYNECOLOGY

## 2022-03-16 PROCEDURE — 88307 TISSUE EXAM BY PATHOLOGIST: CPT | Mod: TC | Performed by: OBSTETRICS & GYNECOLOGY

## 2022-03-16 PROCEDURE — 81025 URINE PREGNANCY TEST: CPT | Performed by: OBSTETRICS & GYNECOLOGY

## 2022-03-16 PROCEDURE — 999N000157 HC STATISTIC RCP TIME EA 10 MIN

## 2022-03-16 PROCEDURE — 370N000017 HC ANESTHESIA TECHNICAL FEE, PER MIN: Performed by: OBSTETRICS & GYNECOLOGY

## 2022-03-16 PROCEDURE — 258N000003 HC RX IP 258 OP 636: Performed by: NURSE ANESTHETIST, CERTIFIED REGISTERED

## 2022-03-16 PROCEDURE — 710N000010 HC RECOVERY PHASE 1, LEVEL 2, PER MIN: Performed by: OBSTETRICS & GYNECOLOGY

## 2022-03-16 PROCEDURE — 86901 BLOOD TYPING SEROLOGIC RH(D): CPT | Performed by: OBSTETRICS & GYNECOLOGY

## 2022-03-16 PROCEDURE — 250N000009 HC RX 250: Performed by: OBSTETRICS & GYNECOLOGY

## 2022-03-16 PROCEDURE — 250N000011 HC RX IP 250 OP 636: Performed by: NURSE ANESTHETIST, CERTIFIED REGISTERED

## 2022-03-16 PROCEDURE — 999N000141 HC STATISTIC PRE-PROCEDURE NURSING ASSESSMENT: Performed by: OBSTETRICS & GYNECOLOGY

## 2022-03-16 PROCEDURE — 58571 TLH W/T/O 250 G OR LESS: CPT | Performed by: OBSTETRICS & GYNECOLOGY

## 2022-03-16 PROCEDURE — 360N000077 HC SURGERY LEVEL 4, PER MIN: Performed by: OBSTETRICS & GYNECOLOGY

## 2022-03-16 PROCEDURE — 250N000011 HC RX IP 250 OP 636: Performed by: OBSTETRICS & GYNECOLOGY

## 2022-03-16 PROCEDURE — 250N000013 HC RX MED GY IP 250 OP 250 PS 637: Performed by: OBSTETRICS & GYNECOLOGY

## 2022-03-16 PROCEDURE — 58571 TLH W/T/O 250 G OR LESS: CPT | Mod: AS | Performed by: NURSE PRACTITIONER

## 2022-03-16 PROCEDURE — 272N000001 HC OR GENERAL SUPPLY STERILE: Performed by: OBSTETRICS & GYNECOLOGY

## 2022-03-16 PROCEDURE — 58552 LAPARO-VAG HYST INCL T/O: CPT | Performed by: NURSE ANESTHETIST, CERTIFIED REGISTERED

## 2022-03-16 PROCEDURE — 250N000025 HC SEVOFLURANE, PER MIN: Performed by: OBSTETRICS & GYNECOLOGY

## 2022-03-16 PROCEDURE — 250N000009 HC RX 250: Performed by: NURSE ANESTHETIST, CERTIFIED REGISTERED

## 2022-03-16 RX ORDER — NALOXONE HYDROCHLORIDE 0.4 MG/ML
0.2 INJECTION, SOLUTION INTRAMUSCULAR; INTRAVENOUS; SUBCUTANEOUS
Status: DISCONTINUED | OUTPATIENT
Start: 2022-03-16 | End: 2022-03-16

## 2022-03-16 RX ORDER — ONDANSETRON 2 MG/ML
4 INJECTION INTRAMUSCULAR; INTRAVENOUS EVERY 6 HOURS PRN
Status: DISCONTINUED | OUTPATIENT
Start: 2022-03-16 | End: 2022-03-16 | Stop reason: HOSPADM

## 2022-03-16 RX ORDER — KETOROLAC TROMETHAMINE 15 MG/ML
15 INJECTION, SOLUTION INTRAMUSCULAR; INTRAVENOUS EVERY 6 HOURS
Status: DISCONTINUED | OUTPATIENT
Start: 2022-03-16 | End: 2022-03-16 | Stop reason: HOSPADM

## 2022-03-16 RX ORDER — OXYCODONE AND ACETAMINOPHEN 5; 325 MG/1; MG/1
1-2 TABLET ORAL EVERY 6 HOURS PRN
Qty: 30 TABLET | Refills: 0 | Status: SHIPPED | OUTPATIENT
Start: 2022-03-16 | End: 2023-11-03

## 2022-03-16 RX ORDER — PROPOFOL 10 MG/ML
INJECTION, EMULSION INTRAVENOUS PRN
Status: DISCONTINUED | OUTPATIENT
Start: 2022-03-16 | End: 2022-03-16

## 2022-03-16 RX ORDER — HYDROMORPHONE HCL IN WATER/PF 6 MG/30 ML
0.4 PATIENT CONTROLLED ANALGESIA SYRINGE INTRAVENOUS
Status: DISCONTINUED | OUTPATIENT
Start: 2022-03-16 | End: 2022-03-16 | Stop reason: HOSPADM

## 2022-03-16 RX ORDER — ACETAMINOPHEN 325 MG/1
975 TABLET ORAL EVERY 6 HOURS
Status: DISCONTINUED | OUTPATIENT
Start: 2022-03-16 | End: 2022-03-16 | Stop reason: HOSPADM

## 2022-03-16 RX ORDER — NALOXONE HYDROCHLORIDE 0.4 MG/ML
0.4 INJECTION, SOLUTION INTRAMUSCULAR; INTRAVENOUS; SUBCUTANEOUS
Status: DISCONTINUED | OUTPATIENT
Start: 2022-03-16 | End: 2022-03-16

## 2022-03-16 RX ORDER — AMOXICILLIN 250 MG
1 CAPSULE ORAL 2 TIMES DAILY
Status: DISCONTINUED | OUTPATIENT
Start: 2022-03-16 | End: 2022-03-16 | Stop reason: HOSPADM

## 2022-03-16 RX ORDER — GLYCOPYRROLATE 0.2 MG/ML
INJECTION, SOLUTION INTRAMUSCULAR; INTRAVENOUS PRN
Status: DISCONTINUED | OUTPATIENT
Start: 2022-03-16 | End: 2022-03-16

## 2022-03-16 RX ORDER — TRANEXAMIC ACID 10 MG/ML
1 INJECTION, SOLUTION INTRAVENOUS ONCE
Status: COMPLETED | OUTPATIENT
Start: 2022-03-16 | End: 2022-03-16

## 2022-03-16 RX ORDER — ONDANSETRON 4 MG/1
4 TABLET, ORALLY DISINTEGRATING ORAL EVERY 30 MIN PRN
Status: DISCONTINUED | OUTPATIENT
Start: 2022-03-16 | End: 2022-03-16 | Stop reason: HOSPADM

## 2022-03-16 RX ORDER — MAGNESIUM SULFATE HEPTAHYDRATE 40 MG/ML
4 INJECTION, SOLUTION INTRAVENOUS ONCE
Status: COMPLETED | OUTPATIENT
Start: 2022-03-16 | End: 2022-03-16

## 2022-03-16 RX ORDER — LIDOCAINE HYDROCHLORIDE 20 MG/ML
INJECTION, SOLUTION INFILTRATION; PERINEURAL PRN
Status: DISCONTINUED | OUTPATIENT
Start: 2022-03-16 | End: 2022-03-16

## 2022-03-16 RX ORDER — NALOXONE HYDROCHLORIDE 0.4 MG/ML
0.4 INJECTION, SOLUTION INTRAMUSCULAR; INTRAVENOUS; SUBCUTANEOUS
Status: DISCONTINUED | OUTPATIENT
Start: 2022-03-16 | End: 2022-03-16 | Stop reason: HOSPADM

## 2022-03-16 RX ORDER — NALOXONE HYDROCHLORIDE 0.4 MG/ML
INJECTION, SOLUTION INTRAMUSCULAR; INTRAVENOUS; SUBCUTANEOUS PRN
Status: DISCONTINUED | OUTPATIENT
Start: 2022-03-16 | End: 2022-03-16

## 2022-03-16 RX ORDER — ACETAMINOPHEN 325 MG/1
650 TABLET ORAL EVERY 6 HOURS
Status: DISCONTINUED | OUTPATIENT
Start: 2022-03-19 | End: 2022-03-16 | Stop reason: HOSPADM

## 2022-03-16 RX ORDER — IBUPROFEN 800 MG/1
800 TABLET, FILM COATED ORAL EVERY 6 HOURS
Status: DISCONTINUED | OUTPATIENT
Start: 2022-03-16 | End: 2022-03-16 | Stop reason: HOSPADM

## 2022-03-16 RX ORDER — LABETALOL 20 MG/4 ML (5 MG/ML) INTRAVENOUS SYRINGE
10
Status: DISCONTINUED | OUTPATIENT
Start: 2022-03-16 | End: 2022-03-16 | Stop reason: HOSPADM

## 2022-03-16 RX ORDER — SODIUM CHLORIDE, SODIUM LACTATE, POTASSIUM CHLORIDE, CALCIUM CHLORIDE 600; 310; 30; 20 MG/100ML; MG/100ML; MG/100ML; MG/100ML
INJECTION, SOLUTION INTRAVENOUS CONTINUOUS
Status: DISCONTINUED | OUTPATIENT
Start: 2022-03-16 | End: 2022-03-16 | Stop reason: HOSPADM

## 2022-03-16 RX ORDER — HYDROXYZINE HYDROCHLORIDE 25 MG/1
25 TABLET, FILM COATED ORAL EVERY 6 HOURS PRN
Status: DISCONTINUED | OUTPATIENT
Start: 2022-03-16 | End: 2022-03-16 | Stop reason: HOSPADM

## 2022-03-16 RX ORDER — ONDANSETRON 2 MG/ML
INJECTION INTRAMUSCULAR; INTRAVENOUS PRN
Status: DISCONTINUED | OUTPATIENT
Start: 2022-03-16 | End: 2022-03-16

## 2022-03-16 RX ORDER — PROCHLORPERAZINE MALEATE 10 MG
10 TABLET ORAL EVERY 6 HOURS PRN
Status: DISCONTINUED | OUTPATIENT
Start: 2022-03-16 | End: 2022-03-16 | Stop reason: HOSPADM

## 2022-03-16 RX ORDER — PROPOFOL 10 MG/ML
INJECTION, EMULSION INTRAVENOUS CONTINUOUS PRN
Status: DISCONTINUED | OUTPATIENT
Start: 2022-03-16 | End: 2022-03-16

## 2022-03-16 RX ORDER — ALBUTEROL SULFATE 0.83 MG/ML
2.5 SOLUTION RESPIRATORY (INHALATION) EVERY 4 HOURS PRN
Status: DISCONTINUED | OUTPATIENT
Start: 2022-03-16 | End: 2022-03-16 | Stop reason: HOSPADM

## 2022-03-16 RX ORDER — CALCIUM CARBONATE 500 MG/1
500 TABLET, CHEWABLE ORAL 4 TIMES DAILY PRN
Status: DISCONTINUED | OUTPATIENT
Start: 2022-03-16 | End: 2022-03-16 | Stop reason: HOSPADM

## 2022-03-16 RX ORDER — NALOXONE HYDROCHLORIDE 0.4 MG/ML
0.2 INJECTION, SOLUTION INTRAMUSCULAR; INTRAVENOUS; SUBCUTANEOUS
Status: DISCONTINUED | OUTPATIENT
Start: 2022-03-16 | End: 2022-03-16 | Stop reason: HOSPADM

## 2022-03-16 RX ORDER — BISACODYL 10 MG
10 SUPPOSITORY, RECTAL RECTAL DAILY PRN
Status: DISCONTINUED | OUTPATIENT
Start: 2022-03-16 | End: 2022-03-16 | Stop reason: HOSPADM

## 2022-03-16 RX ORDER — KETOROLAC TROMETHAMINE 30 MG/ML
INJECTION, SOLUTION INTRAMUSCULAR; INTRAVENOUS PRN
Status: DISCONTINUED | OUTPATIENT
Start: 2022-03-16 | End: 2022-03-16

## 2022-03-16 RX ORDER — ONDANSETRON 4 MG/1
4 TABLET, ORALLY DISINTEGRATING ORAL EVERY 6 HOURS PRN
Status: DISCONTINUED | OUTPATIENT
Start: 2022-03-16 | End: 2022-03-16 | Stop reason: HOSPADM

## 2022-03-16 RX ORDER — HYDROMORPHONE HYDROCHLORIDE 1 MG/ML
0.5 INJECTION, SOLUTION INTRAMUSCULAR; INTRAVENOUS; SUBCUTANEOUS EVERY 5 MIN PRN
Status: DISCONTINUED | OUTPATIENT
Start: 2022-03-16 | End: 2022-03-16 | Stop reason: HOSPADM

## 2022-03-16 RX ORDER — OXYCODONE HYDROCHLORIDE 5 MG/1
5 TABLET ORAL EVERY 4 HOURS PRN
Status: DISCONTINUED | OUTPATIENT
Start: 2022-03-16 | End: 2022-03-16 | Stop reason: HOSPADM

## 2022-03-16 RX ORDER — LORAZEPAM 0.5 MG/1
0.5 TABLET ORAL 3 TIMES DAILY PRN
Status: DISCONTINUED | OUTPATIENT
Start: 2022-03-16 | End: 2022-03-16 | Stop reason: HOSPADM

## 2022-03-16 RX ORDER — HYDROMORPHONE HCL IN WATER/PF 6 MG/30 ML
0.2 PATIENT CONTROLLED ANALGESIA SYRINGE INTRAVENOUS
Status: DISCONTINUED | OUTPATIENT
Start: 2022-03-16 | End: 2022-03-16 | Stop reason: HOSPADM

## 2022-03-16 RX ORDER — OXYCODONE HYDROCHLORIDE 5 MG/1
10 TABLET ORAL EVERY 4 HOURS PRN
Status: DISCONTINUED | OUTPATIENT
Start: 2022-03-16 | End: 2022-03-16 | Stop reason: HOSPADM

## 2022-03-16 RX ORDER — CEFAZOLIN SODIUM 2 G/100ML
2 INJECTION, SOLUTION INTRAVENOUS SEE ADMIN INSTRUCTIONS
Status: DISCONTINUED | OUTPATIENT
Start: 2022-03-16 | End: 2022-03-16 | Stop reason: HOSPADM

## 2022-03-16 RX ORDER — FLUCONAZOLE 150 MG/1
150 TABLET ORAL ONCE
Status: COMPLETED | OUTPATIENT
Start: 2022-03-16 | End: 2022-03-16

## 2022-03-16 RX ORDER — NEOSTIGMINE METHYLSULFATE 1 MG/ML
VIAL (ML) INJECTION PRN
Status: DISCONTINUED | OUTPATIENT
Start: 2022-03-16 | End: 2022-03-16

## 2022-03-16 RX ORDER — LAMOTRIGINE 100 MG/1
200 TABLET ORAL AT BEDTIME
Status: DISCONTINUED | OUTPATIENT
Start: 2022-03-17 | End: 2022-03-16 | Stop reason: HOSPADM

## 2022-03-16 RX ORDER — DEXAMETHASONE SODIUM PHOSPHATE 10 MG/ML
INJECTION, SOLUTION INTRAMUSCULAR; INTRAVENOUS PRN
Status: DISCONTINUED | OUTPATIENT
Start: 2022-03-16 | End: 2022-03-16

## 2022-03-16 RX ORDER — HYDRALAZINE HYDROCHLORIDE 20 MG/ML
2.5-5 INJECTION INTRAMUSCULAR; INTRAVENOUS EVERY 10 MIN PRN
Status: DISCONTINUED | OUTPATIENT
Start: 2022-03-16 | End: 2022-03-16 | Stop reason: HOSPADM

## 2022-03-16 RX ORDER — CEFAZOLIN SODIUM 2 G/100ML
2 INJECTION, SOLUTION INTRAVENOUS
Status: COMPLETED | OUTPATIENT
Start: 2022-03-16 | End: 2022-03-16

## 2022-03-16 RX ORDER — LIDOCAINE 40 MG/G
CREAM TOPICAL
Status: DISCONTINUED | OUTPATIENT
Start: 2022-03-16 | End: 2022-03-16 | Stop reason: HOSPADM

## 2022-03-16 RX ORDER — PHENAZOPYRIDINE HYDROCHLORIDE 100 MG/1
200 TABLET, FILM COATED ORAL ONCE
Status: COMPLETED | OUTPATIENT
Start: 2022-03-16 | End: 2022-03-16

## 2022-03-16 RX ORDER — ONDANSETRON 2 MG/ML
4 INJECTION INTRAMUSCULAR; INTRAVENOUS EVERY 30 MIN PRN
Status: DISCONTINUED | OUTPATIENT
Start: 2022-03-16 | End: 2022-03-16 | Stop reason: HOSPADM

## 2022-03-16 RX ORDER — FENTANYL CITRATE 50 UG/ML
50 INJECTION, SOLUTION INTRAMUSCULAR; INTRAVENOUS EVERY 5 MIN PRN
Status: DISCONTINUED | OUTPATIENT
Start: 2022-03-16 | End: 2022-03-16 | Stop reason: HOSPADM

## 2022-03-16 RX ORDER — ASPIRIN 81 MG
100 TABLET, DELAYED RELEASE (ENTERIC COATED) ORAL DAILY
Qty: 40 TABLET | Refills: 1 | Status: SHIPPED | OUTPATIENT
Start: 2022-03-16

## 2022-03-16 RX ORDER — FENTANYL CITRATE 50 UG/ML
INJECTION, SOLUTION INTRAMUSCULAR; INTRAVENOUS PRN
Status: DISCONTINUED | OUTPATIENT
Start: 2022-03-16 | End: 2022-03-16

## 2022-03-16 RX ORDER — ACETAMINOPHEN 325 MG/1
975 TABLET ORAL ONCE
Status: COMPLETED | OUTPATIENT
Start: 2022-03-16 | End: 2022-03-16

## 2022-03-16 RX ADMIN — FLUCONAZOLE 150 MG: 150 TABLET ORAL at 17:01

## 2022-03-16 RX ADMIN — GLYCOPYRROLATE 0.4 MG: 0.2 INJECTION, SOLUTION INTRAMUSCULAR; INTRAVENOUS at 13:08

## 2022-03-16 RX ADMIN — Medication 2.5 MG: at 13:08

## 2022-03-16 RX ADMIN — Medication 5 MCG: at 12:16

## 2022-03-16 RX ADMIN — ONDANSETRON 4 MG: 2 INJECTION INTRAMUSCULAR; INTRAVENOUS at 11:40

## 2022-03-16 RX ADMIN — KETOROLAC TROMETHAMINE 15 MG: 30 INJECTION, SOLUTION INTRAMUSCULAR at 13:08

## 2022-03-16 RX ADMIN — TRANEXAMIC ACID 1 G: 1 INJECTION, SOLUTION INTRAVENOUS at 11:42

## 2022-03-16 RX ADMIN — LIDOCAINE HYDROCHLORIDE 60 MG: 20 INJECTION, SOLUTION INFILTRATION; PERINEURAL at 11:33

## 2022-03-16 RX ADMIN — PROPOFOL 150 MCG/KG/MIN: 10 INJECTION, EMULSION INTRAVENOUS at 11:33

## 2022-03-16 RX ADMIN — MIDAZOLAM 1 MG: 1 INJECTION INTRAMUSCULAR; INTRAVENOUS at 11:00

## 2022-03-16 RX ADMIN — SODIUM CHLORIDE, POTASSIUM CHLORIDE, SODIUM LACTATE AND CALCIUM CHLORIDE: 600; 310; 30; 20 INJECTION, SOLUTION INTRAVENOUS at 12:49

## 2022-03-16 RX ADMIN — FENTANYL CITRATE 50 MCG: 50 INJECTION, SOLUTION INTRAMUSCULAR; INTRAVENOUS at 11:56

## 2022-03-16 RX ADMIN — ROCURONIUM BROMIDE 30 MG: 10 INJECTION INTRAVENOUS at 11:50

## 2022-03-16 RX ADMIN — TRANEXAMIC ACID 1 G: 10 INJECTION, SOLUTION INTRAVENOUS at 11:51

## 2022-03-16 RX ADMIN — SODIUM CHLORIDE, POTASSIUM CHLORIDE, SODIUM LACTATE AND CALCIUM CHLORIDE: 600; 310; 30; 20 INJECTION, SOLUTION INTRAVENOUS at 10:30

## 2022-03-16 RX ADMIN — PROPOFOL 50 MG: 10 INJECTION, EMULSION INTRAVENOUS at 13:05

## 2022-03-16 RX ADMIN — Medication 80 MG: at 11:33

## 2022-03-16 RX ADMIN — MIDAZOLAM 1 MG: 1 INJECTION INTRAMUSCULAR; INTRAVENOUS at 11:33

## 2022-03-16 RX ADMIN — ACETAMINOPHEN 975 MG: 325 TABLET, FILM COATED ORAL at 10:32

## 2022-03-16 RX ADMIN — MAGNESIUM SULFATE IN WATER 4 G: 40 INJECTION, SOLUTION INTRAVENOUS at 10:31

## 2022-03-16 RX ADMIN — PROPOFOL 150 MG: 10 INJECTION, EMULSION INTRAVENOUS at 11:33

## 2022-03-16 RX ADMIN — NALOXONE HYDROCHLORIDE 40 MCG: 0.4 INJECTION, SOLUTION INTRAMUSCULAR; INTRAVENOUS; SUBCUTANEOUS at 13:32

## 2022-03-16 RX ADMIN — PHENAZOPYRIDINE HYDROCHLORIDE 200 MG: 100 TABLET ORAL at 10:32

## 2022-03-16 RX ADMIN — ACETAMINOPHEN 975 MG: 325 TABLET, FILM COATED ORAL at 16:20

## 2022-03-16 RX ADMIN — CEFAZOLIN SODIUM 2 G: 2 INJECTION, SOLUTION INTRAVENOUS at 11:29

## 2022-03-16 RX ADMIN — FENTANYL CITRATE 50 MCG: 50 INJECTION, SOLUTION INTRAMUSCULAR; INTRAVENOUS at 11:33

## 2022-03-16 RX ADMIN — Medication 10 MCG: at 12:02

## 2022-03-16 RX ADMIN — SODIUM CHLORIDE, POTASSIUM CHLORIDE, SODIUM LACTATE AND CALCIUM CHLORIDE: 600; 310; 30; 20 INJECTION, SOLUTION INTRAVENOUS at 14:50

## 2022-03-16 RX ADMIN — DEXAMETHASONE SODIUM PHOSPHATE 5 MG: 10 INJECTION, SOLUTION INTRAMUSCULAR; INTRAVENOUS at 11:40

## 2022-03-16 ASSESSMENT — ACTIVITIES OF DAILY LIVING (ADL)
DOING_ERRANDS_INDEPENDENTLY_DIFFICULTY: NO
WALKING_OR_CLIMBING_STAIRS_DIFFICULTY: NO
CONCENTRATING,_REMEMBERING_OR_MAKING_DECISIONS_DIFFICULTY: NO
CHANGE_IN_FUNCTIONAL_STATUS_SINCE_ONSET_OF_CURRENT_ILLNESS/INJURY: NO
WEAR_GLASSES_OR_BLIND: NO
FALL_HISTORY_WITHIN_LAST_SIX_MONTHS: NO
DIFFICULTY_EATING/SWALLOWING: NO
DRESSING/BATHING_DIFFICULTY: NO
TOILETING_ISSUES: NO

## 2022-03-16 NOTE — PLAN OF CARE
Pt arrived from PACU at 1455, report obtained from Mame, assumed care of pt.  Pt groggy but awake, responds to questions appropriately.  Assessment complete.  Pt denies any discomfort, requesting her gabriel be removed, minimal UOP.  IVF infusing per orders.  Pt oriented to room and call system.  Vitals obtained, BP 90's/50s.  Lap site x 3, with liquid bandaid, QIAN and C/D/I.  No vaginal bleeding noted.  Dr Kulkarni in the room and updated of pt status including UOP, pt requesting gabriel be removed and BP.  Plan to keep gabriel in at this time, to monitor UOP.  Will continue to monitor pt and provide cares as needed.

## 2022-03-16 NOTE — PLAN OF CARE
Pt requesting to get up to the bathroom.  Pt up with standby asst of 2, steady on her feet.  VSS.  Pt voided a few drops, but gabriel has just been removed. Pt up ambulating in the room and tolerated activity well, denies any dizziness.

## 2022-03-16 NOTE — OR NURSING
Patient remains drowsy.   Denies pain just feels like she needs to have BM.   Declines to use bedpan at this time.   Hand off report to VAL Lee

## 2022-03-16 NOTE — OR NURSING
Patient feels like she has cramping but feels the gabriel is very irritating.   Patient requesting gabriel be removed, updated patient she will need to keep in until she is admitted.  Also requesting to take a warm shower.     Patient's BP remains 80's/ 40's-50's, denies dizziness/lightheadedness and states her blood pressure runs low.    Dr. Kulkarni and anesthesia providers aware of patient's BP's at this time.    Handoff report to VAL Gan.  Patient transported to [facility] via cart.

## 2022-03-16 NOTE — OP NOTE
[facility]    Pre-operative diagnosis: Menorrhagia [N92.0]  Dysmenorrhea [N94.6]  Cervical dysplasia [N87.9]   Post-operative diagnosis Same, minimal endometriosis.   Procedure: Laparoscopic Assisted Vaginal Hysterectomy.  Bilateral Salpingectomy.  Cystoscopy.  Fulguration of endometriosis.    Surgeon:  Assistant: MD Marquita Watson NP (assistance required for retraction, exposure, instrument handling, and wound closure)     Anesthesia: General    Estimated blood loss: Less than 100 ml   Blood transfusion: No transfusion was given during surgery   Drains: Mackey catheter   Specimens: Uterus with cervix,  and fallopian tube (right).     Findings: Left fallopian tube absent.  Small focus/endometriotic implant left uterosacral ligament.   Otherwise normal pelvic anatomy.  On cystoscopy there was no evidence of sutures or perforation and bilateral ureteral jets were noted post procedure.    Complications: None   Condition: Stable         OPERATIVE DICTATION: The patient was brought to the operating room and uneventfully placed under general anesthesia. She was prepped and draped in the dorsal lithotomy position and her bladder drained. The cervix was visualized with a weighted speculum and grasped anteriorly with a fine-tooth tenaculum. The cervix was gently dilated with Flores dilators and a uterine manipulating device placed. We then changed gloves and proceeded to the abdominal portion of the case. A 5 mm infraumbilical skin incision was performed with a scalpel. A Veress needle was introduced without difficulty and a water drop test performed. The abdomen was insufflated with several liters of carbon dioxide. A 5 mm trocar and the laparoscope were introduced. Visualization was excellent. Findings were as described above. Next, two lower lateral 5 mm trocars were placed under direct visualization. After initial exploration, the uterus was elevated. The right  fallopian tube was elevated and  the mesosalpinx was transected with the LigaSure bipolar cutting cautery device. The dissection was continued down through the utero-ovarian, round and broad ligament complexes. A bladder flap was initiated using sharp and blunt dissection and the uterine artery skeletonized within the cardinal ligament. The uterine artery was cauterized with the LigaSure. On the left side dissection continued down through the utero-ovarian, round and broad ligament complexes and the bladder flap completed in the midline.   At this point, the pelvis was checked and found to be hemostatic. The endometriotic implant was fulgurated with monopolar cautery. We then proceeded to the vaginal portion of the case. Excess carbon dioxide was expressed from the abdomen but the trocars left in place. The patient was placed in the high lithotomy position. The uterine manipulating device was removed and the cervix visualized with a weighted speculum. The cervix was grasped with two fine-toothed tenaculum. A circumferential cervical incision was performed with a scalpel. Posterior colpotomy was performed sharply without difficulty. The uterosacral ligaments were clamped, cut and suture ligated with 0 Vicryl. The bladder was then dissected anteriorly off the lower uterine segment and cervix and anterior colpotomy performed. A Columba retractor was placed to displace the bladder superiorly. The remaining cardinal ligament complexes were clamped, cut and suture ligated with 0 Vicryl. The uterus was removed. A pack was placed to displace the bowel out of the pelvis. An external Holt suture was performed in the usual fashion using 0 Vicryl and plicating the uterosacral ligaments high in the pelvis. The Holt's suture was tagged. A reperitonealizing stitch of 0 Vicryl was placed in a pursestring fashion incorporating the uterosacral ligament into the closure. The packing was removed and the pursestring tied. The vaginal cuff was closed with interrupted  2-0 Vicryl sutures with the uterosacral ligament complexes being incorporated it into the vaginal angle sutures. The cuff was irrigated and checked for hemostasis. The Holt's suture was tied with resulting excellent apical vaginal support. Cystoscopy was then performed using a 70-degree rigid cystoscope with normal saline as distention media. Visualization was excellent. Bilateral ureteral jets were noted. There is no evidence of bladder perforation or suture. The cystoscope was withdrawn and a Mackey catheter placed. We then changed gloves and re-proceeded with laparoscopy. The abdomen was reinsufflated with carbon dioxide. The pelvis was irrigated and found to be hemostatic. The trocars were then removed under direct visualization and we proceeded to closure. The subcutaneous spaces were irrigated and checked for hemostasis. The skin incisions were closed with surgical glue. There were no complications and the patient was transferred to the recovery room in excellent stable condition.   Caleb Kulkarni MD  3/16/2022  1:43 PM

## 2022-03-16 NOTE — PLAN OF CARE
Order to discharge pt home.  IV removed.  Pt able to void without difficulty, tolerating activity well.  Prescriptions provided.  AVS reviewed.  Pt verbalized discharge instructions.  Pt discharged via wheelchair accompanied by staff.   present waiting for pt.

## 2022-03-16 NOTE — DISCHARGE SUMMARY
"Discharge Summary    Gerald Evans MRN# [id number]   YOB: 1995 Age: 26 year old     Date of Admission:  3/16/2022  Date of Discharge:  3/16/2022  Admitting Physician:  Caleb Kulkarni MD  Discharge Physician:  Caleb Kulkarni MD  Discharging Service:  Obstetrics and Gynecology     Primary Provider: Jennie Carter          Admission Diagnoses:    Menorrhagia [N92.0]  Dysmenorrhea [N94.6]  Cervical dysplasia [N87.9]            Discharge Diagnosis:   Menorrhagia [N92.0]  Dysmenorrhea [N94.6]  Cervical dysplasia [N87.9]  Endometriosis         Discharge Disposition:   Discharged to home           Condition on Discharge:   Discharge condition: Stable   Discharge vitals: Blood pressure 98/63, pulse 76, temperature 97.8  F (36.6  C), resp. rate 16, height 1.575 m (5' 2\"), weight 46.3 kg (102 lb), last menstrual period 03/01/2022, SpO2 98 %, unknown if currently breastfeeding.   Code status on discharge: Full Code           Procedures / Labs / Imaging:   Invasive procedures: LAVH, right salpingectomy 3/16/22          Medications Prior to Admission:     Medications Prior to Admission   Medication Sig Dispense Refill Last Dose     ibuprofen (ADVIL/MOTRIN) 800 MG tablet Take 1 tablet (800 mg) by mouth every 8 hours as needed for pain 30 tablet 1 Past Week at Unknown time     lamoTRIgine (LAMICTAL) 200 MG tablet 200 mg daily    3/15/2022 at 2100     LORazepam (ATIVAN) 0.5 MG tablet    Past Week at Unknown time     ondansetron (ZOFRAN) 4 MG tablet Take 4 mg by mouth every 8 hours as needed for nausea    Past Week at Unknown time     [DISCONTINUED] Butalbital-Acetaminophen (PHRENILIN)  MG TABS per tablet Take 1 tablet by mouth every 4 hours as needed for headaches        [DISCONTINUED] desogestrel-ethinyl estradiol (APRI) 0.15-30 MG-MCG tablet Take 1 tablet by mouth daily 81 tablet 1 More than a month at Unknown time     [DISCONTINUED] sertraline (ZOLOFT) 25 MG tablet Take 25 mg by mouth daily         "        Discharge Medications:     Current Discharge Medication List      START taking these medications    Details   docusate sodium (COLACE) 100 MG tablet Take 1 tablet (100 mg) by mouth daily  Qty: 40 tablet, Refills: 1    Associated Diagnoses: Post-operative state      oxyCODONE-acetaminophen (PERCOCET) 5-325 MG tablet Take 1-2 tablets by mouth every 6 hours as needed for moderate to severe pain  Qty: 30 tablet, Refills: 0    Associated Diagnoses: Post-operative state         CONTINUE these medications which have NOT CHANGED    Details   ibuprofen (ADVIL/MOTRIN) 800 MG tablet Take 1 tablet (800 mg) by mouth every 8 hours as needed for pain  Qty: 30 tablet, Refills: 1    Associated Diagnoses: Post-operative state      lamoTRIgine (LAMICTAL) 200 MG tablet 200 mg daily       LORazepam (ATIVAN) 0.5 MG tablet       ondansetron (ZOFRAN) 4 MG tablet Take 4 mg by mouth every 8 hours as needed for nausea          STOP taking these medications       Butalbital-Acetaminophen (PHRENILIN)  MG TABS per tablet Comments:   Reason for Stopping:         desogestrel-ethinyl estradiol (APRI) 0.15-30 MG-MCG tablet Comments:   Reason for Stopping:         sertraline (ZOLOFT) 25 MG tablet Comments:   Reason for Stopping:                     Consultations:   No consultations were requested during this admission             Brief History of Illness:   Gerald Evans is a 26 year old female who was admitted for planned surgery          Hospital Course:     LAV, right salpingectomy 3/16/22    Uncomplicate postoperative course.  Patient remained afebrile throughout admission and was discharged on POD # 0               Significant Results:   None             Pending Results:   None           Discharge Instructions and Follow-Up:   Discharge diet: Regular   Discharge activity: No lifting,  or strenuous exercise for 4 week(s)  No driving or operating machinery while on narcotic analgesics  Pelvic rest: abstain from intercourse and do  not use tampons for 4 week(s)   Discharge follow-up: Follow up with me in 4 weeks   Wound care: None   Other instructions: None

## 2022-03-16 NOTE — OR NURSING
"Called Marybeth GURROLA about BP in 80's  UOP low  She is ok with BP at that level.  Dr. Kulkarni present and informed of BP and UOP will continue to monitor. Shad Aj present to talk with pt post procedure.  Pt states she feels like she needs to have a \"bowel movement, like when I have irritable bowel.\"  Pt continue to deny pain  "

## 2022-03-16 NOTE — PROGRESS NOTES
POD #0    Pt demanding discharge and to go out to smoke.  Crampy but pain tolerable. Mackey Dc'd.  Small void. No N/V  Temp: 97.8  F (36.6  C) Temp src: Temporal BP: 98/63 Pulse: 76    SpO2: 98 %     Abd soft, ND.  Incision:  Clean, dry, intact.  No erythema or drainage.    Ext neg    A/p    Stable s/p LAVH, right salpingectomy    Discharge home when criteria met  Discussed meds, restrictions, follow-up.   Call prn if problems in interim.

## 2022-03-16 NOTE — ANESTHESIA CARE TRANSFER NOTE
Patient: Gerald Evans    Procedure: Procedure(s):  LAPAROSCOPIC ASSISTED VAGINAL HYSTERECTOMY  RIGHT LAPAROSCOPIC SALPINGECTOMY  Cystoscopy       Diagnosis: Menorrhagia [N92.0]  Dysmenorrhea [N94.6]  Cervical dysplasia [N87.9]  Diagnosis Additional Information: No value filed.    Anesthesia Type:   General     Note:    Oropharynx: oral airway in place and spontaneously breathing  Level of Consciousness: drowsy  Oxygen Supplementation: nasal cannula  Level of Supplemental Oxygen (L/min / FiO2): 4  Independent Airway: airway patency satisfactory and stable  Dentition: dentition unchanged  Vital Signs Stable: post-procedure vital signs reviewed and stable  Report to RN Given: handoff report given  Patient transferred to: PACU    Handoff Report: Identifed the Patient, Identified the Reponsible Provider, Reviewed the pertinent medical history, Discussed the surgical course, Reviewed Intra-OP anesthesia mangement and issues during anesthesia, Set expectations for post-procedure period and Allowed opportunity for questions and acknowledgement of understanding      Vitals:  Vitals Value Taken Time   BP 88/57 03/16/22 1337   Temp     Pulse 70 03/16/22 1340   Resp 14 03/16/22 1340   SpO2 100 % 03/16/22 1340   Vitals shown include unvalidated device data.    Electronically Signed By: ELLIOTT PASTOR CRNA  March 16, 2022  1:40 PM

## 2022-03-16 NOTE — PLAN OF CARE
"Pt requesting gabriel be removed and also stating she wants to go out and smoke.  Pt is upset and wants to discharge.  Pt's friend present in the room and pt and friend are arguing back and forth.  RN asked pt is she was willing to stay and she said \"No\".  RN asked if she would like to talk to Dr Kulkarni and she said \"Yes\".  Dr Kulkarni paged and updated.  Ok for gabriel to be removed at this time and Dr Kulkarni stated he would be up to see pt.  Will update pt.      "

## 2022-03-16 NOTE — ANESTHESIA PROCEDURE NOTES
Airway       Patient location during procedure: OR       Procedure Start/Stop Times: 3/16/2022 11:36 AM  Staff -        CRNA: Marybeth Villalta APRN CRNA       Performed By: CRNA  Consent for Airway        Urgency: elective  Indications and Patient Condition       Indications for airway management: phani-procedural       Induction type:intravenous       Mask difficulty assessment: 1 - vent by mask    Final Airway Details       Final airway type: endotracheal airway       Successful airway: ETT - single  Endotracheal Airway Details        ETT size (mm): 7.0       Cuffed: yes       Cuff volume (mL): 6       Successful intubation technique: video laryngoscopy       VL Blade Size: Glidescope 3       Grade View of Cords: 1       Adjucts: stylet (rigid glidescope stylet)       Position: Center       Measured from: gums/teeth       Secured at (cm): 21       Bite block used: None    Post intubation assessment        Placement verified by: capnometry, equal breath sounds and chest rise        Number of attempts at approach: 1       Number of other approaches attempted: 0       Secured with: plastic tape       Ease of procedure: easy (Small mouth opening and limited thyromental distance; mouth opening same after paralytic; may be difficult with DL, but no DL attempted)       Dentition: Intact (teeth chipped at baseline; patient places her own over the counter dental resin)

## 2022-03-16 NOTE — DISCHARGE INSTRUCTIONS
No heavy lifting greater than 20lb  for 4 weeks  No driving 1 wk or until off pain medications.   Pelvic rest (no intercourse, douche, tampons)  for 4 weeks  No vigorous activity, exercise, swim, bath for 4 weeks.  May shower day after surgery.   Schedule Postop check Dr. Kulkarni 4 weeks.   Call Dr. Kulkarni [phone] as necessary if problems in interim

## 2022-03-16 NOTE — ANESTHESIA POSTPROCEDURE EVALUATION
Patient: Gerald Evans    Procedure: Procedure(s):  LAPAROSCOPIC ASSISTED VAGINAL HYSTERECTOMY  RIGHT LAPAROSCOPIC SALPINGECTOMY  Cystoscopy       Anesthesia Type:  General    Note:  Disposition: Outpatient   Postop Pain Control: Uneventful            Sign Out: Well controlled pain   PONV: No   Neuro/Psych: Uneventful            Sign Out: Acceptable/Baseline neuro status   Airway/Respiratory: Uneventful            Sign Out: Acceptable/Baseline resp. status   CV/Hemodynamics: Uneventful            Sign Out: Acceptable CV status; No obvious hypovolemia; No obvious fluid overload   Other NRE: NONE   DID A NON-ROUTINE EVENT OCCUR? No           Last vitals:  Vitals Value Taken Time   BP 84/58 03/16/22 1440   Temp 97.3  F (36.3  C) 03/16/22 1440   Pulse 64 03/16/22 1442   Resp 15 03/16/22 1442   SpO2 100 % 03/16/22 1444   Vitals shown include unvalidated device data.    Electronically Signed By: ELLIOTT Peraza CRNA  March 16, 2022  3:14 PM

## 2022-03-17 ENCOUNTER — TELEPHONE (OUTPATIENT)
Dept: OBGYN | Facility: OTHER | Age: 27
End: 2022-03-17
Payer: COMMERCIAL

## 2022-03-17 DIAGNOSIS — Z98.890 POST-OPERATIVE STATE: Primary | ICD-10-CM

## 2022-03-17 NOTE — TELEPHONE ENCOUNTER
Pt had LAVH yesterday and states she is dizzy form the percocet and tylenol is not working for the pain. She states she has had problems with pain medications before. Wants to know if she can had Tylenol #3. Also she has pain in her right rib cage. Has been taking gas ex. Please advise

## 2022-03-17 NOTE — TELEPHONE ENCOUNTER
Is she alternating with ibuprofen as well?  Rib/shoulder pain sounds like gas pain, common after laparoscopic surgery.  We can try increasing percocet to 2 tabs every 4 hours instead of 6.  We could try tylenol 3 but Percocet is stronger than tylenol#3.    But if that worked better in the past I could send in a prescription.  Let me know.

## 2022-03-18 ENCOUNTER — MYC MEDICAL ADVICE (OUTPATIENT)
Dept: OBGYN | Facility: OTHER | Age: 27
End: 2022-03-18
Payer: COMMERCIAL

## 2022-03-18 NOTE — TELEPHONE ENCOUNTER
You could, not sure it would help as much since the incisions are so small.  Most of pain form hysterectomy  is more internal and not helped as much by that.  Would also have to be careful that it doesn't rub on incisions as there is just glue on them.

## 2022-03-24 LAB
PATH REPORT.COMMENTS IMP SPEC: NORMAL
PATH REPORT.COMMENTS IMP SPEC: NORMAL
PATH REPORT.FINAL DX SPEC: NORMAL
PATH REPORT.GROSS SPEC: NORMAL
PATH REPORT.MICROSCOPIC SPEC OTHER STN: NORMAL
PATH REPORT.RELEVANT HX SPEC: NORMAL
PHOTO IMAGE: NORMAL

## 2022-03-24 PROCEDURE — 88307 TISSUE EXAM BY PATHOLOGIST: CPT | Mod: 26 | Performed by: PATHOLOGY

## 2022-04-14 ENCOUNTER — OFFICE VISIT (OUTPATIENT)
Dept: OBGYN | Facility: OTHER | Age: 27
End: 2022-04-14
Attending: OBSTETRICS & GYNECOLOGY
Payer: COMMERCIAL

## 2022-04-14 VITALS
HEIGHT: 61 IN | DIASTOLIC BLOOD PRESSURE: 50 MMHG | HEART RATE: 81 BPM | WEIGHT: 108 LBS | OXYGEN SATURATION: 100 % | SYSTOLIC BLOOD PRESSURE: 96 MMHG | BODY MASS INDEX: 20.39 KG/M2 | TEMPERATURE: 97.1 F

## 2022-04-14 DIAGNOSIS — Z98.890 POST-OPERATIVE STATE: Primary | ICD-10-CM

## 2022-04-14 PROCEDURE — 99024 POSTOP FOLLOW-UP VISIT: CPT | Performed by: OBSTETRICS & GYNECOLOGY

## 2022-04-14 PROCEDURE — G0463 HOSPITAL OUTPT CLINIC VISIT: HCPCS | Mod: 25

## 2022-04-14 PROCEDURE — G0463 HOSPITAL OUTPT CLINIC VISIT: HCPCS

## 2022-04-14 ASSESSMENT — PAIN SCALES - GENERAL: PAINLEVEL: NO PAIN (0)

## 2022-04-14 NOTE — PROGRESS NOTES
"LISSETT Evans is a 26 year old female presents for post operative check. She is  4  week(s) status post LAVH/BS.  She reports doing well and denies significant pain or bleeding.  Bowel and bladder function is satisfactory. Denies incisional problems. Significant findings Benign    O.  Blood pressure 96/50, pulse 81, temperature 97.1  F (36.2  C), temperature source Tympanic, height 1.549 m (5' 1\"), weight 49 kg (108 lb), last menstrual period 03/01/2022, SpO2 100 %, unknown if currently breastfeeding.    Abd: soft, non-tender, non-distended. Incisions clear, dry, and intact without evidence of infection.  Vagina well supported.      A. /P. Satisfactory post-op check.  Continue pelvic rest  Until 6 wks PO.  Otherwise released from restrictions.    F/u prn problems or at next annual examination.    Caleb Kulkarni MD  "

## 2022-04-24 ENCOUNTER — HEALTH MAINTENANCE LETTER (OUTPATIENT)
Age: 27
End: 2022-04-24

## 2022-06-14 ENCOUNTER — HOSPITAL ENCOUNTER (EMERGENCY)
Facility: HOSPITAL | Age: 27
Discharge: HOME | End: 2022-06-14
Attending: NURSE PRACTITIONER | Admitting: NURSE PRACTITIONER
Payer: COMMERCIAL

## 2022-06-14 VITALS
SYSTOLIC BLOOD PRESSURE: 119 MMHG | RESPIRATION RATE: 16 BRPM | TEMPERATURE: 97.6 F | OXYGEN SATURATION: 100 % | DIASTOLIC BLOOD PRESSURE: 76 MMHG | HEART RATE: 86 BPM

## 2022-06-14 DIAGNOSIS — M26.609 TMJ (TEMPOROMANDIBULAR JOINT SYNDROME): ICD-10-CM

## 2022-06-14 DIAGNOSIS — J01.00 ACUTE NON-RECURRENT MAXILLARY SINUSITIS: ICD-10-CM

## 2022-06-14 PROCEDURE — G0463 HOSPITAL OUTPT CLINIC VISIT: HCPCS

## 2022-06-14 PROCEDURE — 250N000013 HC RX MED GY IP 250 OP 250 PS 637: Performed by: NURSE PRACTITIONER

## 2022-06-14 PROCEDURE — 99213 OFFICE O/P EST LOW 20 MIN: CPT | Performed by: NURSE PRACTITIONER

## 2022-06-14 RX ORDER — KETOROLAC TROMETHAMINE 10 MG/1
10 TABLET, FILM COATED ORAL EVERY 6 HOURS PRN
Qty: 20 TABLET | Refills: 0 | Status: SHIPPED | OUTPATIENT
Start: 2022-06-14 | End: 2023-08-31

## 2022-06-14 RX ORDER — KETOROLAC TROMETHAMINE 30 MG/ML
30 INJECTION, SOLUTION INTRAMUSCULAR; INTRAVENOUS ONCE
Status: DISCONTINUED | OUTPATIENT
Start: 2022-06-14 | End: 2022-06-14

## 2022-06-14 RX ADMIN — AMOXICILLIN AND CLAVULANATE POTASSIUM 1 TABLET: 875; 125 TABLET, FILM COATED ORAL at 21:03

## 2022-06-14 ASSESSMENT — ENCOUNTER SYMPTOMS
SINUS PAIN: 1
CARDIOVASCULAR NEGATIVE: 1
SINUS PRESSURE: 1
FEVER: 0
EYES NEGATIVE: 1
ALLERGIC/IMMUNOLOGIC NEGATIVE: 1
HEMATOLOGIC/LYMPHATIC NEGATIVE: 1
RESPIRATORY NEGATIVE: 1
NEUROLOGICAL NEGATIVE: 1
CONSTITUTIONAL NEGATIVE: 1
ENDOCRINE NEGATIVE: 1
GASTROINTESTINAL NEGATIVE: 1
MUSCULOSKELETAL NEGATIVE: 1
PSYCHIATRIC NEGATIVE: 1

## 2022-06-15 NOTE — DISCHARGE INSTRUCTIONS
Thank you for choosing [facility] for your healthcare needs today.  For your TMJ pain, you can use Toradol 10 mg every 6 hours as needed for pain, apply a warm moist compress, and alternate with Tylenol.  For your sinus infection, take Augmentin every 12 hours for 7 days.  Increase your fluid intake and you can apply warm moist compresses to your face to alleviate discomfort.  Thank you

## 2022-06-15 NOTE — ED TRIAGE NOTES
Pt presents with c/o left jaw pain. Sx started a week and a half ago. Pt states she thinks it is TMJ. Pt states she has a hx of it. Pt has been taking tylenol and ibuprofen but reports that it isn't even touching the pain. Reports that she is having trouble sleeping due to pain.

## 2022-06-15 NOTE — ED PROVIDER NOTES
History     Chief Complaint   Patient presents with     Jaw Pain     Left side, hx of TMJ and also wondering about a sinus infection. Pain x1 week.     HPI   History of presenting illness given by patient.    Gerald Evans is a 27 year old female who presents for evaluation of chronic TMJ pain to the left and sinus pain and pressure.  Her symptoms started 1.5 weeks ago and she feels that they have progressively worsened.  Tylenol and ibuprofen are not helping her with her TMJ pain and she has been using Sudafed without relief of symptoms for her sinus pain and pressure.    Allergies:  Allergies   Allergen Reactions     Adderall      Amphetamine Aspartate Other (See Comments)     Aggressive  Adderall       Amphetamine Sulfate Other (See Comments)     Aggressive  Adderall     Amphetamine-Dextroamphetamine      Other reaction(s): Other - Describe In Comment Field  Gets violent     Clindamycin Nausea     Dextroamphetamine Other (See Comments)     Aggressive  Adderall     Diphenhydramine Other (See Comments)     nightmares     Doxycycline Nausea     Strawberry      Sumatriptan Other (See Comments)     Felt really warm with burning ears     Buspar [Buspirone] Rash     Metrogel [Metronidazole] Rash       Problem List:    Patient Active Problem List    Diagnosis Date Noted     Menorrhagia 2022     Priority: Medium     Cervical dysplasia 2022     Priority: Medium     HSIL (high grade squamous intraepithelial lesion) on Pap smear of cervix 2021     Priority: Medium     Added automatically from request for surgery [id number]       [place]'s duct cyst 2018     Priority: Medium     Monitor during pregnacy       Papanicolaou smear of cervix with low grade squamous intraepithelial lesion (LGSIL) 2018     Priority: Medium     Repeat at annual (24)       Dyspareunia in female 2018     Priority: Medium     History of ectopic pregnancy 2018     Priority: Medium     History of   2018     Priority: Medium     History of alcoholism (H) 2018     Priority: Medium     Dysmenorrhea 2018     Priority: Medium     Vaginal bleeding 2016     Priority: Medium     Refusing rhogam despite counseling       Anxiety and depression 2015     Priority: Medium     tools       Loose stools 2015     Priority: Medium     Hidradenitis suppurativa 2013     Priority: Medium     Smoker 2013     Priority: Medium     Trying to quit       Panic disorder without agoraphobia 2013     Priority: Medium     Overview:   HC       Migraine with aura 2010     Priority: Medium     Overview:   IMO Update       Oppositional defiant disorder 2008     Priority: Medium     Overview:   IMO Update 10/11       Constipation 09/10/2007     Priority: Medium     Overview:   IMO Update       Attention deficit hyperactivity disorder (ADHD) 2003     Priority: Medium     Overview:   IMO Update 10 2016          Past Medical History:    No past medical history on file.    Past Surgical History:    Past Surgical History:   Procedure Laterality Date     BIOPSY OF SKIN LESION        SECTION  2016    [facility].     COLPOSCOPY CERVIX, LOOP ELECTRODE BIOPSY, COMBINED N/A 2021    Procedure: LOOP ELECTROSURGICAL EXCISION PROCEDURE;  Surgeon: Caleb Kulkarni MD;  Location: [facility] OR     CYSTOSCOPY N/A 3/16/2022    Procedure: Cystoscopy;  Surgeon: Caleb Kulkarni MD;  Location: [facility] OR     DILATION AND CURETTAGE N/A 2021    Procedure: DILATION AND CURETTAGE, UTERUS;  Surgeon: Caleb Kulkarni MD;  Location: [facility] OR     ENT SURGERY      adenoidectomy     ENT SURGERY      anesthesia for tooth work     LAPAROSCOPIC ASSISTED HYSTERECTOMY VAGINAL N/A 3/16/2022    Procedure: LAPAROSCOPIC ASSISTED VAGINAL HYSTERECTOMY;  Surgeon: Caleb Kulkarni MD;  Location: [facility] OR     LAPAROSCOPIC SALPINGECTOMY Left 2014    Procedure: LAPAROSCOPIC SALPINGECTOMY;  Surgeon: Lake  Leonel FLOYD MD;  Location: [facility] OR     LAPAROSCOPIC SALPINGECTOMY Right 3/16/2022    Procedure: RIGHT LAPAROSCOPIC SALPINGECTOMY;  Surgeon: Caleb Kulkarni MD;  Location: [facility] OR       Family History:    Family History   Problem Relation Age of Onset     Heart Disease Mother         stent placement     C.A.D. Mother      Diabetes Paternal Grandmother      Hypertension Paternal Grandmother        Social History:  Marital Status:   [2]  Social History     Tobacco Use     Smoking status: Current Every Day Smoker     Packs/day: 1.00     Years: 7.00     Pack years: 7.00     Types: Cigarettes     Smokeless tobacco: Never Used   Substance Use Topics     Alcohol use: Yes     Comment: occas     Drug use: No        Medications:    amoxicillin-clavulanate (AUGMENTIN) 875-125 MG tablet  ketorolac (TORADOL) 10 MG tablet  docusate sodium (COLACE) 100 MG tablet  ibuprofen (ADVIL/MOTRIN) 800 MG tablet  lamoTRIgine (LAMICTAL) 200 MG tablet  LORazepam (ATIVAN) 0.5 MG tablet  ondansetron (ZOFRAN) 4 MG tablet  oxyCODONE-acetaminophen (PERCOCET) 5-325 MG tablet          Review of Systems   Constitutional: Negative.  Negative for fever.   HENT: Positive for congestion, dental problem, sinus pressure and sinus pain.    Eyes: Negative.    Respiratory: Negative.    Cardiovascular: Negative.    Gastrointestinal: Negative.    Endocrine: Negative.    Genitourinary: Negative.    Musculoskeletal: Negative.    Skin: Negative.    Allergic/Immunologic: Negative.    Neurological: Negative.    Hematological: Negative.    Psychiatric/Behavioral: Negative.        Physical Exam   BP: 119/76  Pulse: 86  Temp: 97.6  F (36.4  C)  Resp: 16  SpO2: 100 %      Physical Exam  Vitals and nursing note reviewed.   Constitutional:       Appearance: Normal appearance. She is normal weight.   HENT:      Head: Normocephalic and atraumatic.      Nose: Congestion present.      Mouth/Throat:      Mouth: Mucous membranes are moist.      Pharynx: Oropharynx is clear.    Eyes:      Conjunctiva/sclera: Conjunctivae normal.      Pupils: Pupils are equal, round, and reactive to light.   Cardiovascular:      Rate and Rhythm: Normal rate and regular rhythm.      Pulses: Normal pulses.      Heart sounds: Normal heart sounds.   Pulmonary:      Effort: Pulmonary effort is normal.      Breath sounds: Normal breath sounds.   Abdominal:      General: Abdomen is flat. Bowel sounds are normal.      Palpations: Abdomen is soft.   Musculoskeletal:         General: Normal range of motion.      Cervical back: Normal range of motion.   Skin:     General: Skin is warm and dry.   Neurological:      General: No focal deficit present.      Mental Status: She is alert and oriented to person, place, and time.   Psychiatric:         Mood and Affect: Mood normal.         Behavior: Behavior normal.         Thought Content: Thought content normal.         Judgment: Judgment normal.         ED Course               No results found for this or any previous visit (from the past 24 hour(s)).    Medications   amoxicillin-clavulanate (AUGMENTIN) 875-125 MG per tablet 1 tablet (1 tablet Oral Given 6/14/22 2103)       Assessments & Plan (with Medical Decision Making)   27-year-old female presents with chronic TMJ pain to the left and sinus pain and pressure that has been present now for 1.5 weeks.  She is vitally stable and does not appear to be acutely ill or in acute distress.    On assessment she is able to open up her jaw fully.  Oral pharynx shows mild erythema, negative for tonsillar swelling or exudate.  Tenderness to the maxillary region bilaterally.     Plan: Assess and treat    Differential diagnosis includes and is not limited to  Streptococcal pharyngitis  Sinusitis  Coxsackievirus    Discharge plan discussed with patient and treatment for sinusitis as this could be aggravating her chronic TMJ pain.  We discussed antibiotics twice a day for 10 days.  She will use Toradol 10 mg every 6 hours as needed for  moderate amount of pain.  She will follow-up with her primary care provider next week for reevaluation.      I have reviewed the nursing notes.    I have reviewed the findings, diagnosis, plan and need for follow up with the patient.      Discharge Medication List as of 6/14/2022  9:03 PM      START taking these medications    Details   amoxicillin-clavulanate (AUGMENTIN) 875-125 MG tablet Take 1 tablet by mouth 2 times daily, Disp-28 tablet, R-0, E-Prescribe      ketorolac (TORADOL) 10 MG tablet Take 1 tablet (10 mg) by mouth every 6 hours as needed for moderate pain, Disp-20 tablet, R-0, E-Prescribe             Final diagnoses:   TMJ (temporomandibular joint syndrome)   Acute non-recurrent maxillary sinusitis       6/14/2022   [facility]     Mai Valdes APRN CNP  06/19/22 0334

## 2022-11-19 ENCOUNTER — HEALTH MAINTENANCE LETTER (OUTPATIENT)
Age: 27
End: 2022-11-19

## 2023-06-01 ENCOUNTER — HEALTH MAINTENANCE LETTER (OUTPATIENT)
Age: 28
End: 2023-06-01

## 2023-08-06 ENCOUNTER — HOSPITAL ENCOUNTER (EMERGENCY)
Facility: HOSPITAL | Age: 28
Discharge: HOME | End: 2023-08-06
Payer: COMMERCIAL

## 2023-08-06 VITALS
TEMPERATURE: 98 F | BODY MASS INDEX: 18.91 KG/M2 | DIASTOLIC BLOOD PRESSURE: 66 MMHG | WEIGHT: 102.73 LBS | HEIGHT: 62 IN | RESPIRATION RATE: 16 BRPM | HEART RATE: 103 BPM | OXYGEN SATURATION: 94 % | SYSTOLIC BLOOD PRESSURE: 96 MMHG

## 2023-08-06 DIAGNOSIS — J03.90 TONSILLITIS: ICD-10-CM

## 2023-08-06 LAB — GROUP A STREP BY PCR: NOT DETECTED

## 2023-08-06 PROCEDURE — G0463 HOSPITAL OUTPT CLINIC VISIT: HCPCS

## 2023-08-06 PROCEDURE — 99213 OFFICE O/P EST LOW 20 MIN: CPT

## 2023-08-06 PROCEDURE — 87651 STREP A DNA AMP PROBE: CPT

## 2023-08-06 ASSESSMENT — ENCOUNTER SYMPTOMS
FEVER: 1
ACTIVITY CHANGE: 0
VOMITING: 0
SHORTNESS OF BREATH: 0
DIARRHEA: 0
APPETITE CHANGE: 1
RHINORRHEA: 0
TROUBLE SWALLOWING: 1
ABDOMINAL PAIN: 0
CHILLS: 1
SORE THROAT: 1
NAUSEA: 0
COUGH: 0

## 2023-08-06 ASSESSMENT — ACTIVITIES OF DAILY LIVING (ADL): ADLS_ACUITY_SCORE: 37

## 2023-08-06 NOTE — DISCHARGE INSTRUCTIONS
We will call you with your strep results.     Push fluids. Cool liquids, warm salt water gargles, and teas with honey to sooth throat.     Tylenol and ibuprofen to pain control. If you take your home toradol, do not take ibuprofen with it.     Return with any inability to swallow, vomiting, or other concerns.

## 2023-08-06 NOTE — ED TRIAGE NOTES
Pt presents with sore throat for past 2 days. Pt reports her ears are also starting to hurt. Pt feels swollen, she is able to swallow.

## 2023-08-06 NOTE — ED PROVIDER NOTES
History     Chief Complaint   Patient presents with    Pharyngitis     HPI  Gerald Evans is a 28 year old female who presents to the urgent care with a 2 day history of sore throat and bilateral ear pain. Is able to swallow and manage secretion. She has not taken her temperature but has been having cold sweats. She denies abd pain, n/v/d, cough, and shortness of breath. Has been taking toradol and ibuprofen with minimal relief. No recent abx. Daily smoker.       Allergies:  Allergies   Allergen Reactions    Amphetamine Aspartate Other (See Comments)     Aggressive  Adderall      Amphetamine Sulfate Other (See Comments)     Aggressive  Adderall    Amphetamine-Dextroamphet Er     Amphetamine-Dextroamphetamine      Other reaction(s): Other - Describe In Comment Field  Gets violent    Clindamycin Nausea    Dextroamphetamine Other (See Comments)     Aggressive  Adderall    Diphenhydramine Other (See Comments)     nightmares    Doxycycline Nausea    Strawberry Extract     Sumatriptan Other (See Comments)     Felt really warm with burning ears    Buspar [Buspirone] Rash    Metrogel [Metronidazole] Rash       Problem List:    Patient Active Problem List    Diagnosis Date Noted    Menorrhagia 2022     Priority: Medium    Cervical dysplasia 2022     Priority: Medium    HSIL (high grade squamous intraepithelial lesion) on Pap smear of cervix 2021     Priority: Medium     Added automatically from request for surgery [id number]      [place]'s duct cyst 2018     Priority: Medium     Monitor during pregnacy      Papanicolaou smear of cervix with low grade squamous intraepithelial lesion (LGSIL) 2018     Priority: Medium     Repeat at annual (24)      Dyspareunia in female 2018     Priority: Medium    History of ectopic pregnancy 2018     Priority: Medium    History of  2018     Priority: Medium    History of alcoholism (H) 2018     Priority: Medium    Dysmenorrhea  2018     Priority: Medium    Vaginal bleeding 2016     Priority: Medium     Refusing rhogam despite counseling      Anxiety and depression 2015     Priority: Medium     tools      Loose stools 2015     Priority: Medium    Hidradenitis suppurativa 2013     Priority: Medium    Smoker 2013     Priority: Medium     Trying to quit      Panic disorder without agoraphobia 2013     Priority: Medium     Overview:   RM      Migraine with aura 2010     Priority: Medium     Overview:   IMO Update      Oppositional defiant disorder 2008     Priority: Medium     Overview:   IMO Update 10/11      Constipation 09/10/2007     Priority: Medium     Overview:   IMO Update      Attention deficit hyperactivity disorder (ADHD) 2003     Priority: Medium     Overview:   IMO Update 10 2016          Past Medical History:    No past medical history on file.    Past Surgical History:    Past Surgical History:   Procedure Laterality Date    BIOPSY OF SKIN LESION       SECTION  2016    [facility].    COLPOSCOPY CERVIX, LOOP ELECTRODE BIOPSY, COMBINED N/A 2021    Procedure: LOOP ELECTROSURGICAL EXCISION PROCEDURE;  Surgeon: Caleb Kulkarni MD;  Location: [facility]    CYSTOSCOPY N/A 3/16/2022    Procedure: Cystoscopy;  Surgeon: Caleb Kulkarni MD;  [facility]    DILATION AND CURETTAGE N/A 2021    Procedure: DILATION AND CURETTAGE, UTERUS;  Surgeon: Caleb Kulkarni MD;  Location: [facility]    ENT SURGERY      adenoidectomy    ENT SURGERY      anesthesia for tooth work    LAPAROSCOPIC ASSISTED HYSTERECTOMY VAGINAL N/A 3/16/2022    Procedure: LAPAROSCOPIC ASSISTED VAGINAL HYSTERECTOMY;  Surgeon: Caleb Kulkarni MD;  [facility]: [facility]    LAPAROSCOPIC SALPINGECTOMY Left 2014    Procedure: LAPAROSCOPIC SALPINGECTOMY;  Surgeon: Leonel Bethea MD;  [facility]: [facility]    LAPAROSCOPIC SALPINGECTOMY Right 3/16/2022    Procedure: RIGHT LAPAROSCOPIC SALPINGECTOMY;  Surgeon:  "Caleb Kulkarni MD;  Location: [facility]       Family History:    Family History   Problem Relation Age of Onset    Heart Disease Mother         stent placement    C.A.D. Mother     Diabetes Paternal Grandmother     Hypertension Paternal Grandmother        Social History:  Marital Status:   [2]  Social History     Tobacco Use    Smoking status: Every Day     Packs/day: 1.00     Years: 7.00     Pack years: 7.00     Types: Cigarettes    Smokeless tobacco: Never   Substance Use Topics    Alcohol use: Yes     Comment: occas    Drug use: No        Medications:    amoxicillin-clavulanate (AUGMENTIN) 875-125 MG tablet  docusate sodium (COLACE) 100 MG tablet  ibuprofen (ADVIL/MOTRIN) 800 MG tablet  ketorolac (TORADOL) 10 MG tablet  lamoTRIgine (LAMICTAL) 200 MG tablet  LORazepam (ATIVAN) 0.5 MG tablet  ondansetron (ZOFRAN) 4 MG tablet  oxyCODONE-acetaminophen (PERCOCET) 5-325 MG tablet          Review of Systems   Constitutional:  Positive for appetite change, chills and fever (has not taken temperature). Negative for activity change.   HENT:  Positive for ear pain, sore throat and trouble swallowing (due to pain). Negative for congestion and rhinorrhea.    Respiratory:  Negative for cough and shortness of breath.    Cardiovascular:  Negative for chest pain.   Gastrointestinal:  Negative for abdominal pain, diarrhea, nausea and vomiting.   Skin:  Negative for rash.   All other systems reviewed and are negative.      Physical Exam   BP: 96/66  Pulse: 103  Temp: 98  F (36.7  C)  Resp: 16  Height: 157.5 cm (5' 2\")  Weight: 46.6 kg (102 lb 12.8 oz)  SpO2: 94 %      Physical Exam  Vitals and nursing note reviewed.   Constitutional:       General: She is not in acute distress.     Appearance: She is well-developed and normal weight. She is not ill-appearing, toxic-appearing or diaphoretic.   HENT:      Right Ear: Tympanic membrane and ear canal normal. No drainage, swelling or tenderness. No middle ear effusion. Tympanic " membrane is not erythematous.      Left Ear: Tympanic membrane and ear canal normal. No drainage, swelling or tenderness.  No middle ear effusion. Tympanic membrane is not erythematous.      Nose: No congestion or rhinorrhea.      Mouth/Throat:      Mouth: Mucous membranes are moist. Oral lesions present.      Pharynx: Uvula midline. Pharyngeal swelling, oropharyngeal exudate and posterior oropharyngeal erythema present. No uvula swelling.      Tonsils: Tonsillar exudate present. No tonsillar abscesses. 2+ on the right. 2+ on the left.   Eyes:      Extraocular Movements:      Right eye: Normal extraocular motion.      Left eye: Normal extraocular motion.      Pupils: Pupils are equal, round, and reactive to light.   Cardiovascular:      Rate and Rhythm: Normal rate and regular rhythm.      Heart sounds: No murmur heard.  Pulmonary:      Effort: Pulmonary effort is normal.      Breath sounds: Normal breath sounds. No wheezing, rhonchi or rales.   Lymphadenopathy:      Cervical: No cervical adenopathy.   Skin:     General: Skin is warm and dry.      Capillary Refill: Capillary refill takes less than 2 seconds.      Coloration: Skin is not pale.      Findings: No erythema or rash.   Neurological:      General: No focal deficit present.      Mental Status: She is alert and oriented to person, place, and time.         ED Course                 Procedures                No results found for this or any previous visit (from the past 24 hour(s)).    Medications - No data to display    Assessments & Plan (with Medical Decision Making)     I have reviewed the nursing notes.    I have reviewed the findings, diagnosis, plan and need for follow up with the patient.  Gerald Evans is a 28 year old female who presents to the urgent care with a 2 day history of sore throat and bilateral ear pain. Is able to swallow and manage secretion. She has not taken her temperature but has been having cold sweats. She denies abd pain,  n/v/d, cough, and shortness of breath. Has been taking toradol and ibuprofen with minimal relief. No recent abx. Daily smoker.     MDM: Strep: pending.   VSS and afebrile. Lungs clear, heart tones regular. TM clear bilaterally. Erythema and exudate noted to posterior oropharynx and tonsils. Tonsils 2+ and equal. No visible peritonsillar abscess. There is no trismus. She is able to swallow and manage secretions. She is non toxic in appearance. Drinking fluids in UC room.     (J03.90) Tonsillitis  Plan: We will call you with your strep results.     Push fluids. Cool liquids, warm salt water gargles, and teas with honey to sooth throat.     Tylenol and ibuprofen to pain control. If you take your home toradol, do not take ibuprofen with it.     Return with any inability to swallow, vomiting, or other concerns. Understanding verbalized.           Discharge Medication List as of 8/6/2023  1:26 PM          Final diagnoses:   Tonsillitis       8/6/2023   [facility]       Chuyita Flores NP  08/06/23 1126

## 2023-08-06 NOTE — ED TRIAGE NOTES
Pt presents with c/o sore throat and bilateral ear pain. Sx started 2 days ago. Pt took tylenol this morning. Pt is able to swallow without issue.

## 2023-08-31 ENCOUNTER — HOSPITAL ENCOUNTER (EMERGENCY)
Facility: HOSPITAL | Age: 28
Discharge: HOME | End: 2023-08-31
Attending: NURSE PRACTITIONER | Admitting: NURSE PRACTITIONER
Payer: COMMERCIAL

## 2023-08-31 VITALS
OXYGEN SATURATION: 100 % | SYSTOLIC BLOOD PRESSURE: 116 MMHG | TEMPERATURE: 97.5 F | BODY MASS INDEX: 18.91 KG/M2 | DIASTOLIC BLOOD PRESSURE: 80 MMHG | HEIGHT: 62 IN | RESPIRATION RATE: 16 BRPM | WEIGHT: 102.73 LBS | HEART RATE: 96 BPM

## 2023-08-31 DIAGNOSIS — K02.9 DENTAL CARIES: ICD-10-CM

## 2023-08-31 DIAGNOSIS — K04.7 DENTAL INFECTION: ICD-10-CM

## 2023-08-31 PROCEDURE — G0463 HOSPITAL OUTPT CLINIC VISIT: HCPCS

## 2023-08-31 PROCEDURE — 99213 OFFICE O/P EST LOW 20 MIN: CPT | Performed by: NURSE PRACTITIONER

## 2023-08-31 RX ORDER — KETOROLAC TROMETHAMINE 10 MG/1
10 TABLET, FILM COATED ORAL EVERY 6 HOURS PRN
Qty: 20 TABLET | Refills: 0 | Status: SHIPPED | OUTPATIENT
Start: 2023-08-31 | End: 2024-04-21

## 2023-08-31 ASSESSMENT — ENCOUNTER SYMPTOMS
NAUSEA: 0
VOMITING: 0
ABDOMINAL PAIN: 0
FATIGUE: 0
SINUS PAIN: 1
APPETITE CHANGE: 0
TROUBLE SWALLOWING: 0
FACIAL SWELLING: 1
SORE THROAT: 0
DIARRHEA: 0
FEVER: 0
COUGH: 0

## 2023-08-31 ASSESSMENT — ACTIVITIES OF DAILY LIVING (ADL): ADLS_ACUITY_SCORE: 37

## 2023-08-31 NOTE — ED TRIAGE NOTES
Pt presents with c/o facial swelling onset yesterday. Pt reports that she has broken teeth on the right side of her mouth, also reports chronic sinus infections. Denies difficulty breathing or swallowing.

## 2023-08-31 NOTE — ED PROVIDER NOTES
History     Chief Complaint   Patient presents with    Facial Swelling     HPI  Gerald Evans is a 28 year old female who presents today with a CC of right sided facial pain and swelling.  Symptoms started yesterday.  No fevers.  She has been taking toradol and tylenol with improvement.  She has a history of dental issues, with cracked tooth in the area of swelling.  She sees [facility] in [place] or oral surgery.  She sees Oral Surgery in [place].      Allergies:  Allergies   Allergen Reactions    Amphetamine Aspartate Other (See Comments)     Aggressive  Adderall      Amphetamine Sulfate Other (See Comments)     Aggressive  Adderall    Amphetamine-Dextroamphet Er     Amphetamine-Dextroamphetamine      Other reaction(s): Other - Describe In Comment Field  Gets violent    Clindamycin Nausea    Dextroamphetamine Other (See Comments)     Aggressive  Adderall    Diphenhydramine Other (See Comments)     nightmares    Doxycycline Nausea    Strawberry Extract     Sumatriptan Other (See Comments)     Felt really warm with burning ears    Buspar [Buspirone] Rash    Metrogel [Metronidazole] Rash       Problem List:    Patient Active Problem List    Diagnosis Date Noted    Menorrhagia 2022     Priority: Medium    Cervical dysplasia 2022     Priority: Medium    HSIL (high grade squamous intraepithelial lesion) on Pap smear of cervix 2021     Priority: Medium     Added automatically from request for surgery [id number]      [place]'s duct cyst 2018     Priority: Medium     Monitor during pregnacy      Papanicolaou smear of cervix with low grade squamous intraepithelial lesion (LGSIL) 2018     Priority: Medium     Repeat at annual (24)      Dyspareunia in female 2018     Priority: Medium    History of ectopic pregnancy 2018     Priority: Medium    History of  2018     Priority: Medium    History of alcoholism (H) 2018     Priority: Medium    Dysmenorrhea  2018     Priority: Medium    Vaginal bleeding 2016     Priority: Medium     Refusing rhogam despite counseling      Anxiety and depression 2015     Priority: Medium     tools      Loose stools 2015     Priority: Medium    Hidradenitis suppurativa 2013     Priority: Medium    Smoker 2013     Priority: Medium     Trying to quit      Panic disorder without agoraphobia 2013     Priority: Medium     Overview:   [facility]      Migraine with aura 2010     Priority: Medium     Overview:   IMO Update      Oppositional defiant disorder 2008     Priority: Medium     Overview:   IMO Update 10/11      Constipation 09/10/2007     Priority: Medium     Overview:   IMO Update      Attention deficit hyperactivity disorder (ADHD) 2003     Priority: Medium     Overview:   IMO Update 10 2016          Past Medical History:    History reviewed. No pertinent past medical history.    Past Surgical History:    Past Surgical History:   Procedure Laterality Date    BIOPSY OF SKIN LESION       SECTION  2016    [facility].    COLPOSCOPY CERVIX, LOOP ELECTRODE BIOPSY, COMBINED N/A 2021    Procedure: LOOP ELECTROSURGICAL EXCISION PROCEDURE;  Surgeon: Caleb Kulkarni MD;  [facility]    CYSTOSCOPY N/A 3/16/2022    Procedure: Cystoscopy;  Surgeon: Caleb Kulkarni MD;  [facility]    DILATION AND CURETTAGE N/A 2021    Procedure: DILATION AND CURETTAGE, UTERUS;  Surgeon: Caleb Kulkarni MD;  [facility]    ENT SURGERY      adenoidectomy    ENT SURGERY      anesthesia for tooth work    LAPAROSCOPIC ASSISTED HYSTERECTOMY VAGINAL N/A 3/16/2022    Procedure: LAPAROSCOPIC ASSISTED VAGINAL HYSTERECTOMY;  Surgeon: Caleb Kulkarni MD;  [facility]    LAPAROSCOPIC SALPINGECTOMY Left 2014    Procedure: LAPAROSCOPIC SALPINGECTOMY;  Surgeon: Leonel Bethea MD;  [facility]    LAPAROSCOPIC SALPINGECTOMY Right 3/16/2022    Procedure: RIGHT LAPAROSCOPIC  "SALPINGECTOMY;  Surgeon: Caleb Kulkarni MD;  Location: [facility]       Family History:    Family History   Problem Relation Age of Onset    Heart Disease Mother         stent placement    C.A.D. Mother     Diabetes Paternal Grandmother     Hypertension Paternal Grandmother        Social History:  Marital Status:   [2]  Social History     Tobacco Use    Smoking status: Every Day     Packs/day: 1.00     Years: 7.00     Pack years: 7.00     Types: Cigarettes    Smokeless tobacco: Never   Substance Use Topics    Alcohol use: Yes     Comment: occas    Drug use: No        Medications:    amoxicillin-clavulanate (AUGMENTIN) 875-125 MG tablet  ibuprofen (ADVIL/MOTRIN) 800 MG tablet  ketorolac (TORADOL) 10 MG tablet  lamoTRIgine (LAMICTAL) 200 MG tablet  LORazepam (ATIVAN) 0.5 MG tablet  ondansetron (ZOFRAN) 4 MG tablet  docusate sodium (COLACE) 100 MG tablet  oxyCODONE-acetaminophen (PERCOCET) 5-325 MG tablet          Review of Systems   Constitutional:  Negative for appetite change, fatigue and fever.   HENT:  Positive for dental problem, facial swelling and sinus pain (right maxillary). Negative for sore throat and trouble swallowing.    Respiratory:  Negative for cough.    Gastrointestinal:  Negative for abdominal pain, diarrhea, nausea and vomiting.       Physical Exam   BP: 116/80  Pulse: 96  Temp: 97.5  F (36.4  C)  Resp: 16  Height: 157.5 cm (5' 2\")  Weight: 46.6 kg (102 lb 11.8 oz)  SpO2: 100 %      Physical Exam  Vitals and nursing note reviewed.   Constitutional:       General: She is not in acute distress.     Appearance: Normal appearance.   HENT:      Head:      Jaw: Swelling present.        Comments: Right jaw swelling     Right Ear: Tympanic membrane, ear canal and external ear normal.      Left Ear: Tympanic membrane, ear canal and external ear normal.      Mouth/Throat:      Dentition: Abnormal dentition. Dental tenderness and dental caries present. No dental abscesses (no obvious).     Cardiovascular: "      Rate and Rhythm: Normal rate and regular rhythm.   Pulmonary:      Effort: Pulmonary effort is normal.      Breath sounds: Normal breath sounds.   Musculoskeletal:         General: Normal range of motion.      Cervical back: Normal range of motion and neck supple. No tenderness.   Lymphadenopathy:      Cervical: No cervical adenopathy.   Skin:     General: Skin is warm and dry.   Neurological:      General: No focal deficit present.      Mental Status: She is alert and oriented to person, place, and time.   Psychiatric:         Mood and Affect: Mood normal.         Behavior: Behavior normal.         ED Course     Exam completed     Assessments & Plan (with Medical Decision Making)     I have reviewed the nursing notes.    I have reviewed the findings, diagnosis, plan and need for follow up with the patient.    Medical Decision Making  The patient's presentation was of straightforward complexity (a clearly self-limited or minor problem).    The patient's evaluation involved:  history and exam without other MDM data elements    The patient's management necessitated moderate risk (prescription drug management including medications given in the ED).        Discharge Medication List as of 8/31/2023 11:15 AM        START taking these medications    Details   amoxicillin-clavulanate (AUGMENTIN) 875-125 MG tablet Take 1 tablet by mouth 2 times daily for 14 days, Disp-28 tablet, R-0, E-Prescribe             Final diagnoses:   Dental infection - tooth #5   Dental caries     Augmentin as prescribed for infection.  Apply a cold pack or heat compress for up to 20 minutes several times a day for symptomatic improvement  Rinse your mouth with warm saltwater several times per day. This will help reduce irritation, gum swelling, and pain.  Good oral hygiene is imperitive. Brush your at least twice a day. Use a fluoride toothpaste and soft-bristle toothbrush.  Sensitive toothpaste may also help  Eat a healthy diet that doesn t  "include many sugary foods and drinks.  Referral to Oral Surgery given, recommend to call and schedule appt  Return to Emergency Room or Urgent Care if symptoms increase or concerns develop such as those discussed and listed on the \"When to go the Emergency Room\" portion of your discharge instructions.       8/31/2023   [facility]       Jenna Alexander NP  08/31/23 [id number]    "

## 2023-09-07 ENCOUNTER — HOSPITAL ENCOUNTER (EMERGENCY)
Facility: HOSPITAL | Age: 28
Discharge: HOME | End: 2023-09-07
Attending: PHYSICIAN ASSISTANT | Admitting: PHYSICIAN ASSISTANT

## 2023-09-07 ENCOUNTER — APPOINTMENT (OUTPATIENT)
Dept: CT IMAGING | Facility: HOSPITAL | Age: 28
End: 2023-09-07
Attending: PHYSICIAN ASSISTANT

## 2023-09-07 VITALS
TEMPERATURE: 98.1 F | RESPIRATION RATE: 16 BRPM | OXYGEN SATURATION: 100 % | HEART RATE: 85 BPM | SYSTOLIC BLOOD PRESSURE: 123 MMHG | DIASTOLIC BLOOD PRESSURE: 86 MMHG

## 2023-09-07 DIAGNOSIS — R22.1 NECK MASS: ICD-10-CM

## 2023-09-07 LAB
ANION GAP SERPL CALCULATED.3IONS-SCNC: 9 MMOL/L (ref 7–15)
BASOPHILS # BLD AUTO: 0 10E3/UL (ref 0–0.2)
BASOPHILS NFR BLD AUTO: 0 %
BUN SERPL-MCNC: 8.1 MG/DL (ref 6–20)
CALCIUM SERPL-MCNC: 9.1 MG/DL (ref 8.6–10)
CHLORIDE SERPL-SCNC: 102 MMOL/L (ref 98–107)
CREAT SERPL-MCNC: 0.63 MG/DL (ref 0.51–0.95)
DEPRECATED HCO3 PLAS-SCNC: 25 MMOL/L (ref 22–29)
EGFRCR SERPLBLD CKD-EPI 2021: >90 ML/MIN/1.73M2
EOSINOPHIL # BLD AUTO: 0.1 10E3/UL (ref 0–0.7)
EOSINOPHIL NFR BLD AUTO: 1 %
ERYTHROCYTE [DISTWIDTH] IN BLOOD BY AUTOMATED COUNT: 13.6 % (ref 10–15)
GLUCOSE SERPL-MCNC: 73 MG/DL (ref 70–99)
HCT VFR BLD AUTO: 37.2 % (ref 35–47)
HGB BLD-MCNC: 12.4 G/DL (ref 11.7–15.7)
IMM GRANULOCYTES # BLD: 0 10E3/UL
IMM GRANULOCYTES NFR BLD: 0 %
LYMPHOCYTES # BLD AUTO: 1.8 10E3/UL (ref 0.8–5.3)
LYMPHOCYTES NFR BLD AUTO: 19 %
MCH RBC QN AUTO: 29.2 PG (ref 26.5–33)
MCHC RBC AUTO-ENTMCNC: 33.3 G/DL (ref 31.5–36.5)
MCV RBC AUTO: 88 FL (ref 78–100)
MONOCYTES # BLD AUTO: 0.7 10E3/UL (ref 0–1.3)
MONOCYTES NFR BLD AUTO: 7 %
NEUTROPHILS # BLD AUTO: 7 10E3/UL (ref 1.6–8.3)
NEUTROPHILS NFR BLD AUTO: 73 %
NRBC # BLD AUTO: 0 10E3/UL
NRBC BLD AUTO-RTO: 0 /100
PLATELET # BLD AUTO: 277 10E3/UL (ref 150–450)
POTASSIUM SERPL-SCNC: 4 MMOL/L (ref 3.4–5.3)
RBC # BLD AUTO: 4.24 10E6/UL (ref 3.8–5.2)
SODIUM SERPL-SCNC: 136 MMOL/L (ref 136–145)
WBC # BLD AUTO: 9.7 10E3/UL (ref 4–11)

## 2023-09-07 PROCEDURE — 70491 CT SOFT TISSUE NECK W/DYE: CPT

## 2023-09-07 PROCEDURE — 85025 COMPLETE CBC W/AUTO DIFF WBC: CPT | Performed by: PHYSICIAN ASSISTANT

## 2023-09-07 PROCEDURE — 36415 COLL VENOUS BLD VENIPUNCTURE: CPT | Performed by: PHYSICIAN ASSISTANT

## 2023-09-07 PROCEDURE — 82310 ASSAY OF CALCIUM: CPT | Performed by: PHYSICIAN ASSISTANT

## 2023-09-07 PROCEDURE — 250N000011 HC RX IP 250 OP 636: Performed by: RADIOLOGY

## 2023-09-07 PROCEDURE — 99213 OFFICE O/P EST LOW 20 MIN: CPT | Performed by: PHYSICIAN ASSISTANT

## 2023-09-07 PROCEDURE — G0463 HOSPITAL OUTPT CLINIC VISIT: HCPCS

## 2023-09-07 RX ORDER — IOPAMIDOL 755 MG/ML
65 INJECTION, SOLUTION INTRAVASCULAR ONCE
Status: COMPLETED | OUTPATIENT
Start: 2023-09-07 | End: 2023-09-07

## 2023-09-07 RX ORDER — CLINDAMYCIN HCL 300 MG
300 CAPSULE ORAL 4 TIMES DAILY
Qty: 40 CAPSULE | Refills: 0 | Status: SHIPPED | OUTPATIENT
Start: 2023-09-07 | End: 2023-09-17

## 2023-09-07 RX ORDER — KETOROLAC TROMETHAMINE 10 MG/1
10 TABLET, FILM COATED ORAL EVERY 6 HOURS PRN
Qty: 20 TABLET | Refills: 0 | Status: SHIPPED | OUTPATIENT
Start: 2023-09-07 | End: 2024-04-21

## 2023-09-07 RX ADMIN — IOPAMIDOL 65 ML: 755 INJECTION, SOLUTION INTRAVENOUS at 11:54

## 2023-09-07 ASSESSMENT — ACTIVITIES OF DAILY LIVING (ADL)
ADLS_ACUITY_SCORE: 37
ADLS_ACUITY_SCORE: 37

## 2023-09-07 NOTE — ED TRIAGE NOTES
Patient presents with complaints of left sided neck swelling, has been on an antibiotic but states the swelling started about 2 days ago. She states she does feel like she's able to turn her head better today.

## 2023-09-07 NOTE — ED TRIAGE NOTES
Pt presents with c/o neck pain    right sided neck pain,tenderness. Swelling started 2 days, did have tenderness with moving her neck for the past 2 days, does feel better today.   is on abx from a tooth infection   Does have post nasal drip and cough

## 2023-09-07 NOTE — ED PROVIDER NOTES
History     Chief Complaint   Patient presents with    Neck Pain     HPI  Gerald Evans is a 28 year old female who presents to the urgent care for evaluation of right neck pain.  Symptoms started 2 days ago.  She initially had a bout of pain with movement of her neck she is on antibiotics for recent dental infection on the same side.  Notes swelling has gotten worse.  No known fevers or chills denies any difficulty swallowing does note some postnasal drip has had a recent dry cough.    Allergies:  Allergies   Allergen Reactions    Amphetamine Aspartate Other (See Comments)     Aggressive  Adderall      Amphetamine Sulfate Other (See Comments)     Aggressive  Adderall    Amphetamine-Dextroamphet Er     Amphetamine-Dextroamphetamine      Other reaction(s): Other - Describe In Comment Field  Gets violent    Clindamycin Nausea    Dextroamphetamine Other (See Comments)     Aggressive  Adderall    Diphenhydramine Other (See Comments)     nightmares    Doxycycline Nausea    Strawberry Extract     Sumatriptan Other (See Comments)     Felt really warm with burning ears    Buspar [Buspirone] Rash    Metrogel [Metronidazole] Rash       Problem List:    Patient Active Problem List    Diagnosis Date Noted    Menorrhagia 2022     Priority: Medium    Cervical dysplasia 2022     Priority: Medium    HSIL (high grade squamous intraepithelial lesion) on Pap smear of cervix 2021     Priority: Medium     Added automatically from request for surgery [id number]      Fayette's duct cyst 2018     Priority: Medium     Monitor during pregnacy      Papanicolaou smear of cervix with low grade squamous intraepithelial lesion (LGSIL) 2018     Priority: Medium     Repeat at annual (24)      Dyspareunia in female 2018     Priority: Medium    History of ectopic pregnancy 2018     Priority: Medium    History of  2018     Priority: Medium    History of alcoholism (H) 2018     Priority:  Medium    Dysmenorrhea 2018     Priority: Medium    Vaginal bleeding 2016     Priority: Medium     Refusing rhogam despite counseling      Anxiety and depression 2015     Priority: Medium     tools      Loose stools 2015     Priority: Medium    Hidradenitis suppurativa 2013     Priority: Medium    Smoker 2013     Priority: Medium     Trying to quit      Panic disorder without agoraphobia 2013     Priority: Medium     Overview:   [facility]      Migraine with aura 2010     Priority: Medium     Overview:   IMO Update      Oppositional defiant disorder 2008     Priority: Medium     Overview:   IMO Update 10/11      Constipation 09/10/2007     Priority: Medium     Overview:   IMO Update      Attention deficit hyperactivity disorder (ADHD) 2003     Priority: Medium     Overview:   IMO Update 10 2016          Past Medical History:    No past medical history on file.    Past Surgical History:    Past Surgical History:   Procedure Laterality Date    BIOPSY OF SKIN LESION       SECTION  2016    [facility].    COLPOSCOPY CERVIX, LOOP ELECTRODE BIOPSY, COMBINED N/A 2021    Procedure: LOOP ELECTROSURGICAL EXCISION PROCEDURE;  Surgeon: Caleb Kulkarni MD;  [facility]    CYSTOSCOPY N/A 3/16/2022    Procedure: Cystoscopy;  Surgeon: Caleb Kulkarni MD;  [facility]    DILATION AND CURETTAGE N/A 2021    Procedure: DILATION AND CURETTAGE, UTERUS;  Surgeon: Caleb Kulkarni MD;  [facility]    ENT SURGERY      adenoidectomy    ENT SURGERY      anesthesia for tooth work    LAPAROSCOPIC ASSISTED HYSTERECTOMY VAGINAL N/A 3/16/2022    Procedure: LAPAROSCOPIC ASSISTED VAGINAL HYSTERECTOMY;  Surgeon: Caleb Kulkarni MD;  [facility]    LAPAROSCOPIC SALPINGECTOMY Left 2014    Procedure: LAPAROSCOPIC SALPINGECTOMY;  Surgeon: Leonel Bethea MD;  [facility]    LAPAROSCOPIC SALPINGECTOMY Right 3/16/2022    Procedure: RIGHT LAPAROSCOPIC  SALPINGECTOMY;  Surgeon: Caleb Kulkarni MD;  [facility]       Family History:    Family History   Problem Relation Age of Onset    Heart Disease Mother         stent placement    C.A.D. Mother     Diabetes Paternal Grandmother     Hypertension Paternal Grandmother        Social History:  Marital Status:   [4]  Social History     Tobacco Use    Smoking status: Every Day     Packs/day: 1.00     Years: 7.00     Pack years: 7.00     Types: Cigarettes    Smokeless tobacco: Never   Substance Use Topics    Alcohol use: Yes     Comment: occas    Drug use: No        Medications:    clindamycin (CLEOCIN) 300 MG capsule  ketorolac (TORADOL) 10 MG tablet  amoxicillin-clavulanate (AUGMENTIN) 875-125 MG tablet  docusate sodium (COLACE) 100 MG tablet  ibuprofen (ADVIL/MOTRIN) 800 MG tablet  ketorolac (TORADOL) 10 MG tablet  lamoTRIgine (LAMICTAL) 200 MG tablet  LORazepam (ATIVAN) 0.5 MG tablet  ondansetron (ZOFRAN) 4 MG tablet  oxyCODONE-acetaminophen (PERCOCET) 5-325 MG tablet          Review of Systems   All other systems reviewed and are negative.      Physical Exam   BP: 123/86  Pulse: 85  Temp: 98.1  F (36.7  C)  Resp: 16  SpO2: 100 %      Physical Exam  Constitutional:       General: She is not in acute distress.     Appearance: Normal appearance. She is normal weight. She is not ill-appearing, toxic-appearing or diaphoretic.   HENT:      Head: Normocephalic and atraumatic.      Right Ear: External ear normal.      Left Ear: External ear normal.   Eyes:      Extraocular Movements: Extraocular movements intact.      Conjunctiva/sclera: Conjunctivae normal.      Pupils: Pupils are equal, round, and reactive to light.   Neck:     Cardiovascular:      Rate and Rhythm: Normal rate.   Pulmonary:      Effort: Pulmonary effort is normal. No respiratory distress.   Musculoskeletal:         General: Normal range of motion.   Skin:     General: Skin is warm and dry.      Coloration: Skin is not jaundiced or pale.    Neurological:      Mental Status: She is alert and oriented to person, place, and time. Mental status is at baseline.      Cranial Nerves: No cranial nerve deficit.   Psychiatric:         Mood and Affect: Mood normal.         ED Course        I have reviewed the epic chart, the nurses note and triage note. vital signs were reviewed.  Nontoxic-appearing female with right-sided neck pain and swelling and recent dental infection.  I am concerned that she has a deep space abscess.  I did discuss this patient care with Dr. Mensah who is working in the ER today and he has agreement that further imaging would be warranted.  At this time we do not think the CTA for Lemierre's disease evaluation.  She has had a hysterectomy will not check a pregnancy test  CT shows a round rim enhancing lesion. 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 I consulted with ENT Dr. Shah at [facility] who recommended follow up with ENT. Patient would like to follow up with [facility] and referral placed.  Will place on antibiotics. If symptoms getting worse return to the ED.  Toradol prescribed.        ED Course as of 09/08/23 1015   Thu Sep 07, 2023   1100 I was consulted on this patient.  Reasonable suspicion for deep space infection warrants CT of the neck with contrast.  We did discuss possible requirement for CTA of the neck, however, overall clinically less concerned about lemiere's syndrome, so reasonable for soft tissue neck with contrast.  Agree with current management plan     Procedures                Results for orders placed or performed during the hospital encounter of 09/07/23 (from the past 24 hour(s))   CBC with platelets differential    Narrative    The following orders were created for panel order CBC with platelets differential.  Procedure                               Abnormality         Status                     ---------                               -----------          ------                     CBC with platelets and d...[[id number]]                      Final result                 Please view results for these tests on the individual orders.   Basic metabolic panel   Result Value Ref Range    Sodium 136 136 - 145 mmol/L    Potassium 4.0 3.4 - 5.3 mmol/L    Chloride 102 98 - 107 mmol/L    Carbon Dioxide (CO2) 25 22 - 29 mmol/L    Anion Gap 9 7 - 15 mmol/L    Urea Nitrogen 8.1 6.0 - 20.0 mg/dL    Creatinine 0.63 0.51 - 0.95 mg/dL    Calcium 9.1 8.6 - 10.0 mg/dL    Glucose 73 70 - 99 mg/dL    GFR Estimate >90 >60 mL/min/1.73m2   CBC with platelets and differential   Result Value Ref Range    WBC Count 9.7 4.0 - 11.0 10e3/uL    RBC Count 4.24 3.80 - 5.20 10e6/uL    Hemoglobin 12.4 11.7 - 15.7 g/dL    Hematocrit 37.2 35.0 - 47.0 %    MCV 88 78 - 100 fL    MCH 29.2 26.5 - 33.0 pg    MCHC 33.3 31.5 - 36.5 g/dL    RDW 13.6 10.0 - 15.0 %    Platelet Count 277 150 - 450 10e3/uL    % Neutrophils 73 %    % Lymphocytes 19 %    % Monocytes 7 %    % Eosinophils 1 %    % Basophils 0 %    % Immature Granulocytes 0 %    NRBCs per 100 WBC 0 <1 /100    Absolute Neutrophils 7.0 1.6 - 8.3 10e3/uL    Absolute Lymphocytes 1.8 0.8 - 5.3 10e3/uL    Absolute Monocytes 0.7 0.0 - 1.3 10e3/uL    Absolute Eosinophils 0.1 0.0 - 0.7 10e3/uL    Absolute Basophils 0.0 0.0 - 0.2 10e3/uL    Absolute Immature Granulocytes 0.0 <=0.4 10e3/uL    Absolute NRBCs 0.0 10e3/uL   Soft tissue neck CT w contrast    Narrative    PROCEDURE: CT SOFT TISSUE NECK W CONTRAST 9/7/2023 12:15 PM    HISTORY: recent dental abscess, now pain swelling to the right neck  near SCM    COMPARISONS: None.    Meds/Dose Given:    TECHNIQUE: CT scan of the neck with IV contrast sagittal coronal  reconstructions    FINDINGS: The muscles of mastication and the parapharyngeal spaces are  normal. The salivary glands are normal. No bony destructive lesions  are seen in the mandible or maxilla. The frontal ethmoid maxillary and  sphenoid sinuses are  clear.    The larynx and upper trachea is normal. The thyroid gland is normal.  The cervical lymph nodes are normal in caliber. The right  jugulodigastric lymph node is larger than the left. Deep to the  sternocleidomastoid and lateral to the jugular vein on the right is a  low-density lesion measuring 13 mm in diameter. This has an enhancing  rim and the appearances most consistent with a branchial cleft cyst.  An infected branchial cleft cyst cannot be excluded. There is slight  edema seen in the deep fat in the right side of the neck.    The thyroid appears normal. The supraclavicular and upper mediastinal  lymph nodes are normal. The lung apices are clear.         Impression    IMPRESSION: 1.3cm in diameter in diameter low-density lesion with a  thin enhancing wall. 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 Infection in the cyst cannot be excluded.    MARVIN HANDY MD         SYSTEM ID:  [id number]       Medications   iopamidol (ISOVUE-370) solution 65 mL (65 mLs Intravenous $Given 9/7/23 1154)   sodium chloride (PF) 0.9% PF flush 60 mL (50 mLs Intravenous $Given 9/7/23 1155)       Assessments & Plan (with Medical Decision Making)     I have reviewed the nursing notes.    I have reviewed the findings, diagnosis, plan and need for follow up with the patient.        Discharge Medication List as of 9/7/2023  1:07 PM        START taking these medications    Details   clindamycin (CLEOCIN) 300 MG capsule Take 1 capsule (300 mg) by mouth 4 times daily for 10 days, Disp-40 capsule, R-0, E-Prescribe      !! ketorolac (TORADOL) 10 MG tablet Take 1 tablet (10 mg) by mouth every 6 hours as needed for moderate pain, Disp-20 tablet, R-0, E-Prescribe       !! - Potential duplicate medications found. Please discuss with provider.          Final diagnoses:   Neck mass       9/7/2023   [facility]       Dwayne Cid PA-C  09/08/23 1015

## 2023-09-07 NOTE — DISCHARGE INSTRUCTIONS
I am going to switch her antibiotics at this time.  I have also have placed a referral for ENT.  You can go over there and make an appointment as well.  If you have any concerns or questions give us a call back.  At this time there is no emergent work-up required.

## 2023-11-03 ENCOUNTER — HOSPITAL ENCOUNTER (EMERGENCY)
Facility: HOSPITAL | Age: 28
Discharge: HOME | End: 2023-11-03
Attending: NURSE PRACTITIONER | Admitting: NURSE PRACTITIONER
Payer: COMMERCIAL

## 2023-11-03 VITALS
HEART RATE: 98 BPM | OXYGEN SATURATION: 99 % | RESPIRATION RATE: 16 BRPM | SYSTOLIC BLOOD PRESSURE: 102 MMHG | HEIGHT: 62 IN | BODY MASS INDEX: 18.91 KG/M2 | WEIGHT: 102.73 LBS | DIASTOLIC BLOOD PRESSURE: 64 MMHG | TEMPERATURE: 97.6 F

## 2023-11-03 DIAGNOSIS — B96.89 BV (BACTERIAL VAGINOSIS): ICD-10-CM

## 2023-11-03 DIAGNOSIS — N76.0 BV (BACTERIAL VAGINOSIS): ICD-10-CM

## 2023-11-03 DIAGNOSIS — Z20.2 STD EXPOSURE: ICD-10-CM

## 2023-11-03 LAB
ALBUMIN UR-MCNC: NEGATIVE MG/DL
APPEARANCE UR: CLEAR
BACTERIA #/AREA URNS HPF: ABNORMAL /HPF
BILIRUB UR QL STRIP: NEGATIVE
C TRACH DNA SPEC QL PROBE+SIG AMP: NEGATIVE
CLUE CELLS: PRESENT
COLOR UR AUTO: ABNORMAL
GLUCOSE UR STRIP-MCNC: NEGATIVE MG/DL
HGB UR QL STRIP: NEGATIVE
KETONES UR STRIP-MCNC: NEGATIVE MG/DL
LEUKOCYTE ESTERASE UR QL STRIP: NEGATIVE
N GONORRHOEA DNA SPEC QL NAA+PROBE: NEGATIVE
NITRATE UR QL: NEGATIVE
PH UR STRIP: 5 [PH] (ref 4.7–8)
RBC URINE: <1 /HPF
SP GR UR STRIP: 1 (ref 1–1.03)
SQUAMOUS EPITHELIAL: 0 /HPF
TRICHOMONAS, WET PREP: ABNORMAL
UROBILINOGEN UR STRIP-MCNC: NORMAL MG/DL
WBC URINE: 0 /HPF
WBC'S/HIGH POWER FIELD, WET PREP: ABNORMAL
YEAST, WET PREP: ABNORMAL

## 2023-11-03 PROCEDURE — G0463 HOSPITAL OUTPT CLINIC VISIT: HCPCS

## 2023-11-03 PROCEDURE — 81001 URINALYSIS AUTO W/SCOPE: CPT | Performed by: NURSE PRACTITIONER

## 2023-11-03 PROCEDURE — 99213 OFFICE O/P EST LOW 20 MIN: CPT | Performed by: NURSE PRACTITIONER

## 2023-11-03 PROCEDURE — 87210 SMEAR WET MOUNT SALINE/INK: CPT | Performed by: NURSE PRACTITIONER

## 2023-11-03 PROCEDURE — 87491 CHLMYD TRACH DNA AMP PROBE: CPT | Performed by: NURSE PRACTITIONER

## 2023-11-03 RX ORDER — CLINDAMYCIN HCL 300 MG
300 CAPSULE ORAL 2 TIMES DAILY
Qty: 14 CAPSULE | Refills: 0 | Status: SHIPPED | OUTPATIENT
Start: 2023-11-03 | End: 2023-11-10

## 2023-11-03 RX ORDER — HYDROXYZINE HYDROCHLORIDE 25 MG/1
TABLET, FILM COATED ORAL
COMMUNITY
Start: 2023-03-20

## 2023-11-03 RX ORDER — PRAZOSIN HYDROCHLORIDE 2 MG/1
CAPSULE ORAL
COMMUNITY
Start: 2023-03-20

## 2023-11-03 RX ORDER — METHYLPHENIDATE HYDROCHLORIDE 10 MG/1
TABLET ORAL
COMMUNITY
Start: 2023-10-04

## 2023-11-03 ASSESSMENT — ENCOUNTER SYMPTOMS
NAUSEA: 0
SHORTNESS OF BREATH: 0
VOMITING: 0
DYSURIA: 0
MYALGIAS: 0
ABDOMINAL PAIN: 0
FLANK PAIN: 0
HEADACHES: 0
APPETITE CHANGE: 0
ACTIVITY CHANGE: 1
BACK PAIN: 1
DIZZINESS: 0
LIGHT-HEADEDNESS: 0
CHILLS: 0
DIARRHEA: 0
HEMATURIA: 0
FEVER: 0

## 2023-11-03 NOTE — ED TRIAGE NOTES
Pt presents with c/o exposure to herpies. States her partner told her that he tested positive for it and had a small sore. Denies any current sx.

## 2023-11-03 NOTE — ED PROVIDER NOTES
History     Chief Complaint   Patient presents with    Exposure to STD     Exposure to herpies     HPI  Gerald Evans is a 28 year old female who was exposed to herpes 6 days ago.  Her partner told her that he had herpes.  Did not clarify genital or oral.  She denies symptoms other than back pain.  Has been taking Tylenol, ibuprofen, and warm baths.  Last dose of Tylenol and ibuprofen was last evening.  This does help decrease her discomfort.  History of UTIs; last episode 2 months ago.  Smoker.  Denies fevers, chills, nausea, vomiting, abdominal, pelvic, and flank pain, shortness of breath, headaches, dizziness, and lightheadedness.    URINARY TRACT SYMPTOMS    Duration:  six days  Description  back pain  Intensity:  mild  Accompanying signs and symptoms:  Fever/chills: no   Flank pain no   Nausea and vomiting: no   Vaginal symptoms: none  Abdominal/Pelvic Pain: no   History  History of frequent UTI's: YES-  hx.  Two months ago  History of kidney stones: no   Sexually Active: YES- concerns for std  Possibility of pregnancy: No  Precipitating or alleviating factors: None  Therapies tried and outcome: Tylenol and ibuprofen and warm baths  Outcome: last night ibuprofen and acetaminophen     Allergies:  Allergies   Allergen Reactions    Amphetamine Aspartate Other (See Comments)     Aggressive  Adderall      Amphetamine Sulfate Other (See Comments)     Aggressive  Adderall    Amphetamine-Dextroamphet Er     Amphetamine-Dextroamphetamine      Other reaction(s): Other - Describe In Comment Field  Gets violent    Clindamycin Nausea    Dextroamphetamine Other (See Comments)     Aggressive  Adderall    Diphenhydramine Other (See Comments)     nightmares    Doxycycline Nausea    Strawberry Extract     Sumatriptan Other (See Comments)     Felt really warm with burning ears    Buspar [Buspirone] Rash    Metrogel [Metronidazole] Rash       Problem List:    Patient Active Problem List    Diagnosis Date Noted    Menorrhagia  2022     Priority: Medium    Cervical dysplasia 2022     Priority: Medium    HSIL (high grade squamous intraepithelial lesion) on Pap smear of cervix 2021     Priority: Medium     Added automatically from request for surgery [id number]      Petros's duct cyst 2018     Priority: Medium     Monitor during pregnacy      Papanicolaou smear of cervix with low grade squamous intraepithelial lesion (LGSIL) 2018     Priority: Medium     Repeat at annual (24)      Dyspareunia in female 2018     Priority: Medium    History of ectopic pregnancy 2018     Priority: Medium    History of  2018     Priority: Medium    History of alcoholism (H) 2018     Priority: Medium    Dysmenorrhea 2018     Priority: Medium    Vaginal bleeding 2016     Priority: Medium     Refusing rhogam despite counseling      Anxiety and depression 2015     Priority: Medium     tools      Loose stools 2015     Priority: Medium    Hidradenitis suppurativa 2013     Priority: Medium    Smoker 2013     Priority: Medium     Trying to quit      Panic disorder without agoraphobia 2013     Priority: Medium     Overview:   [facility]      Migraine with aura 2010     Priority: Medium     Overview:   IMO Update      Oppositional defiant disorder 2008     Priority: Medium     Overview:   IMO Update 10/11      Constipation 09/10/2007     Priority: Medium     Overview:   IMO Update      Attention deficit hyperactivity disorder (ADHD) 2003     Priority: Medium     Overview:   IMO Update 10 2016          Past Medical History:    History reviewed. No pertinent past medical history.    Past Surgical History:    Past Surgical History:   Procedure Laterality Date    BIOPSY OF SKIN LESION  2013     SECTION  2016    .    COLPOSCOPY CERVIX, LOOP ELECTRODE BIOPSY, COMBINED N/A 2021    Procedure: LOOP ELECTROSURGICAL EXCISION PROCEDURE;   Surgeon: Caleb Kulkarni MD;  [facility]    CYSTOSCOPY N/A 3/16/2022    Procedure: Cystoscopy;  Surgeon: Caleb Kulkarni MD;  [facility]    DILATION AND CURETTAGE N/A 8/25/2021    Procedure: DILATION AND CURETTAGE, UTERUS;  Surgeon: Caleb Kulkarni MD;  [facility]    ENT SURGERY      adenoidectomy    ENT SURGERY      anesthesia for tooth work    LAPAROSCOPIC ASSISTED HYSTERECTOMY VAGINAL N/A 3/16/2022    Procedure: LAPAROSCOPIC ASSISTED VAGINAL HYSTERECTOMY;  Surgeon: Caleb Kulkarni MD;  [facility]    LAPAROSCOPIC SALPINGECTOMY Left 12/27/2014    Procedure: LAPAROSCOPIC SALPINGECTOMY;  Surgeon: Leonel Bethea MD;  [facility]    LAPAROSCOPIC SALPINGECTOMY Right 3/16/2022    Procedure: RIGHT LAPAROSCOPIC SALPINGECTOMY;  Surgeon: Caleb Kulkarni MD;  [facility]       Family History:    Family History   Problem Relation Age of Onset    Heart Disease Mother         stent placement    C.A.D. Mother     Diabetes Paternal Grandmother     Hypertension Paternal Grandmother        Social History:  Marital Status:   [4]  Social History     Tobacco Use    Smoking status: Every Day     Packs/day: 1.00     Years: 7.00     Additional pack years: 0.00     Total pack years: 7.00     Types: Cigarettes    Smokeless tobacco: Never   Substance Use Topics    Alcohol use: Yes     Comment: occas    Drug use: No        Medications:    clindamycin (CLEOCIN) 300 MG capsule  hydrOXYzine (ATARAX) 25 MG tablet  ibuprofen (ADVIL/MOTRIN) 800 MG tablet  ketorolac (TORADOL) 10 MG tablet  ketorolac (TORADOL) 10 MG tablet  lamoTRIgine (LAMICTAL) 200 MG tablet  LORazepam (ATIVAN) 0.5 MG tablet  methylphenidate (RITALIN) 10 MG tablet  ondansetron (ZOFRAN) 4 MG tablet  prazosin (MINIPRESS) 2 MG capsule  docusate sodium (COLACE) 100 MG tablet          Review of Systems   Constitutional:  Positive for activity change. Negative for appetite change, chills and fever.   Respiratory:  Negative for shortness of breath.   "  Gastrointestinal:  Negative for abdominal pain, diarrhea, nausea and vomiting.         Hysterectomy. 2021. Abnormal cells   Genitourinary:  Negative for dysuria, flank pain, hematuria, pelvic pain, vaginal bleeding and vaginal discharge.   Musculoskeletal:  Positive for back pain. Negative for myalgias.   Skin: Negative.    Neurological:  Negative for dizziness, light-headedness and headaches.       Physical Exam   BP: 102/64  Pulse: 98  Temp: 97.6  F (36.4  C)  Resp: 16  Height: 157.5 cm (5' 2\")  Weight: 46.6 kg (102 lb 11.8 oz)  SpO2: 99 %      Physical Exam  Vitals and nursing note reviewed.   Constitutional:       General: She is not in acute distress.     Appearance: She is normal weight.   Cardiovascular:      Rate and Rhythm: Normal rate and regular rhythm.      Heart sounds: Normal heart sounds. No murmur heard.  Pulmonary:      Effort: Pulmonary effort is normal. No respiratory distress.      Breath sounds: Normal breath sounds. No wheezing, rhonchi or rales.   Abdominal:      General: Abdomen is flat. Bowel sounds are normal. There is no distension.      Palpations: Abdomen is soft. There is no hepatomegaly or splenomegaly.      Tenderness: There is no abdominal tenderness. There is no right CVA tenderness, left CVA tenderness or guarding.   Skin:     General: Skin is warm and dry.   Neurological:      Mental Status: She is alert and oriented to person, place, and time.   Psychiatric:         Behavior: Behavior normal.         ED Course                 Procedures             Results for orders placed or performed during the hospital encounter of 11/03/23 (from the past 24 hour(s))   Wet prep    Specimen: Vagina; Swab   Result Value Ref Range    Trichomonas Absent Absent    Yeast Absent Absent    Clue Cells Present (A) Absent    WBCs/high power field 1+ (A) None   Chlamydia trachomatis/Neisseria gonorrhoeae by PCR    Specimen: Urine, Voided   Result Value Ref Range    Chlamydia Trachomatis Negative " Negative    Neisseria gonorrhoeae Negative Negative    Narrative    Assay performed using [employer] real-time, reverse-transcriptase PCR.   UA with Microscopic reflex to Culture    Specimen: Urine, Midstream   Result Value Ref Range    Color Urine Straw Colorless, Straw, Light Yellow, Yellow    Appearance Urine Clear Clear    Glucose Urine Negative Negative mg/dL    Bilirubin Urine Negative Negative    Ketones Urine Negative Negative mg/dL    Specific Gravity Urine 1.003 1.003 - 1.035    Blood Urine Negative Negative    pH Urine 5.0 4.7 - 8.0    Protein Albumin Urine Negative Negative mg/dL    Urobilinogen Urine Normal Normal, 2.0 mg/dL    Nitrite Urine Negative Negative    Leukocyte Esterase Urine Negative Negative    Bacteria Urine Few (A) None Seen /HPF    RBC Urine <1 <=2 /HPF    WBC Urine 0 <=5 /HPF    Squamous Epithelials Urine 0 <=1 /HPF    Narrative    Urine Culture not indicated       Medications - No data to display    Assessments & Plan (with Medical Decision Making)     I have reviewed the nursing notes.    I have reviewed the findings, diagnosis, plan and need for follow up with the patient.  (Z20.2) STD exposure    (N76.0,  B96.89) BV (bacterial vaginosis)  Comment: 28 year old female who was exposed to herpes 6 days ago.  Her partner told her that he had herpes.  Did not clarify genital or oral.  She denies symptoms other than back pain.  Has been taking Tylenol, ibuprofen, and warm baths.  Last dose of Tylenol and ibuprofen was last evening.  This does help decrease her discomfort.  History of UTIs; last episode 2 months ago.  Smoker.  Denies fevers, chills, nausea, vomiting, abdominal, pelvic, and flank pain, shortness of breath, headaches, dizziness, and lightheadedness.    MDM:NHT. Lungs CTA  Abdominal assessment negative  Wet prep positive for clue cells  UA negative  G/C negative    Plan: Clindamycin twice daily for 7 days.(Allergy to metronidazole).  Education provided and/or discussed for  this/these medication, bacterial vaginosis, and genital herpes.  -Increase fluids.   -Complete all antibiotics even if feeling better.    -Taking antibiotics with food may decrease the symptoms, of an upset stomach, that can occur when taking antibiotics. Antibiotics frequently cause diarrhea. Probiotics or yogurt may help prevent or decrease these symptoms.   -Return to be reevaluated if symptoms do not improve, or worsen.  These discharge instructions and medications were reviewed with her and understanding verbalized.    This document was prepared using a combination of typing and voice generated software.  While every attempt was made for accuracy, spelling and grammatical errors may exist.    Discharge Medication List as of 11/3/2023  1:53 PM        START taking these medications    Details   clindamycin (CLEOCIN) 300 MG capsule Take 1 capsule (300 mg) by mouth 2 times daily for 7 days, Disp-14 capsule, R-0, E-Prescribe             Final diagnoses:   STD exposure   BV (bacterial vaginosis)       11/3/2023   [facility]         Marquita Turner, CNP  11/03/23 [id number]

## 2024-04-21 ENCOUNTER — HOSPITAL ENCOUNTER (EMERGENCY)
Facility: HOSPITAL | Age: 29
Discharge: HOME | End: 2024-04-21
Attending: PHYSICIAN ASSISTANT | Admitting: PHYSICIAN ASSISTANT

## 2024-04-21 VITALS
OXYGEN SATURATION: 100 % | DIASTOLIC BLOOD PRESSURE: 75 MMHG | SYSTOLIC BLOOD PRESSURE: 107 MMHG | RESPIRATION RATE: 18 BRPM | TEMPERATURE: 97.4 F | HEART RATE: 82 BPM

## 2024-04-21 DIAGNOSIS — K04.7 DENTAL ABSCESS: ICD-10-CM

## 2024-04-21 PROCEDURE — G0463 HOSPITAL OUTPT CLINIC VISIT: HCPCS

## 2024-04-21 PROCEDURE — 99213 OFFICE O/P EST LOW 20 MIN: CPT | Performed by: PHYSICIAN ASSISTANT

## 2024-04-21 RX ORDER — KETOROLAC TROMETHAMINE 10 MG/1
10 TABLET, FILM COATED ORAL EVERY 6 HOURS PRN
Qty: 20 TABLET | Refills: 0 | Status: SHIPPED | OUTPATIENT
Start: 2024-04-21

## 2024-04-21 ASSESSMENT — ACTIVITIES OF DAILY LIVING (ADL): ADLS_ACUITY_SCORE: 37

## 2024-04-21 NOTE — ED TRIAGE NOTES
Pt presents with c/o having left lower dental pain and swelling   Pt reports has been an ongoing issue but has been unable to get into the dentist   S/x started a couple days ago   No otc meds taken today

## 2024-04-21 NOTE — DISCHARGE INSTRUCTIONS
Take the Augmentin as prescribed for your dental infection.   Take the Toradol as prescribed for pain. (No ibuprofen while taking this).  Follow up with a dentist ASAP.   Return here sooner with any new or worsening symptoms.

## 2024-04-21 NOTE — ED PROVIDER NOTES
History     Chief Complaint   Patient presents with    Dental Pain     HPI  Gerald Evans is a 28 year old female who presents with left lower dental pain x 2 days. Notes some jaw swelling. No difficulty opening her jar or with swallowing. No fevers. No drainage.     Allergies:  Allergies   Allergen Reactions    Amphetamine Aspartate Other (See Comments)     Aggressive  Adderall      Amphetamine Sulfate Other (See Comments)     Aggressive  Adderall    Amphetamine-Dextroamphet Er     Amphetamine-Dextroamphetamine      Other reaction(s): Other - Describe In Comment Field  Gets violent    Clindamycin Nausea    Dextroamphetamine Other (See Comments)     Aggressive  Adderall    Diphenhydramine Other (See Comments)     nightmares    Doxycycline Nausea    Strawberry Extract     Sumatriptan Other (See Comments)     Felt really warm with burning ears    Buspar [Buspirone] Rash    Metrogel [Metronidazole] Rash       Problem List:    Patient Active Problem List    Diagnosis Date Noted    Menorrhagia 2022     Priority: Medium    Cervical dysplasia 2022     Priority: Medium    HSIL (high grade squamous intraepithelial lesion) on Pap smear of cervix 2021     Priority: Medium     Added automatically from request for surgery [id number]      [place]'s duct cyst 2018     Priority: Medium     Monitor during pregnacy      Papanicolaou smear of cervix with low grade squamous intraepithelial lesion (LGSIL) 2018     Priority: Medium     Repeat at annual (24)      Dyspareunia in female 2018     Priority: Medium    History of ectopic pregnancy 2018     Priority: Medium    History of  2018     Priority: Medium    History of alcoholism (H) 2018     Priority: Medium    Dysmenorrhea 2018     Priority: Medium    Vaginal bleeding 2016     Priority: Medium     Refusing rhogam despite counseling      Anxiety and depression 2015     Priority: Medium     tools      Loose  stools 2015     Priority: Medium    Hidradenitis suppurativa 2013     Priority: Medium    Smoker 2013     Priority: Medium     Trying to quit      Panic disorder without agoraphobia 2013     Priority: Medium     Overview:   [facility]      Migraine with aura 2010     Priority: Medium     Overview:   IMO Update      Oppositional defiant disorder 2008     Priority: Medium     Overview:   IMO Update 10/11      Constipation 09/10/2007     Priority: Medium     Overview:   IMO Update      Attention deficit hyperactivity disorder (ADHD) 2003     Priority: Medium     Overview:   IMO Update 10 2016          Past Medical History:    No past medical history on file.    Past Surgical History:    Past Surgical History:   Procedure Laterality Date    BIOPSY OF SKIN LESION       SECTION  2016    [facility].    COLPOSCOPY CERVIX, LOOP ELECTRODE BIOPSY, COMBINED N/A 2021    Procedure: LOOP ELECTROSURGICAL EXCISION PROCEDURE;  Surgeon: Caleb Kulkarni MD;  Location: [facility]    CYSTOSCOPY N/A 3/16/2022    Procedure: Cystoscopy;  Surgeon: Caleb Kulkarni MD;  [facility]: [facility]    DILATION AND CURETTAGE N/A 2021    Procedure: DILATION AND CURETTAGE, UTERUS;  Surgeon: Caleb Kulkarni MD;  Location: [facility]    ENT SURGERY      adenoidectomy    ENT SURGERY      anesthesia for tooth work    LAPAROSCOPIC ASSISTED HYSTERECTOMY VAGINAL N/A 3/16/2022    Procedure: LAPAROSCOPIC ASSISTED VAGINAL HYSTERECTOMY;  Surgeon: Caleb Kulkarni MD;  [facility]: [facility]    LAPAROSCOPIC SALPINGECTOMY Left 2014    Procedure: LAPAROSCOPIC SALPINGECTOMY;  Surgeon: Leonel Bethea MD;  [facility]: [facility]    LAPAROSCOPIC SALPINGECTOMY Right 3/16/2022    Procedure: RIGHT LAPAROSCOPIC SALPINGECTOMY;  Surgeon: Caleb Kulkarni MD;  Location: [facility]       Family History:    Family History   Problem Relation Age of Onset    Heart Disease Mother         stent placement    C.A.D. Mother     Diabetes Paternal  Grandmother     Hypertension Paternal Grandmother        Social History:  Marital Status:   [4]  Social History     Tobacco Use    Smoking status: Every Day     Current packs/day: 1.00     Average packs/day: 1 pack/day for 7.0 years (7.0 ttl pk-yrs)     Types: Cigarettes    Smokeless tobacco: Never   Substance Use Topics    Alcohol use: Yes     Comment: occas    Drug use: No        Medications:    amoxicillin-clavulanate (AUGMENTIN) 875-125 MG tablet  ketorolac (TORADOL) 10 MG tablet  docusate sodium (COLACE) 100 MG tablet  hydrOXYzine (ATARAX) 25 MG tablet  ibuprofen (ADVIL/MOTRIN) 800 MG tablet  lamoTRIgine (LAMICTAL) 200 MG tablet  LORazepam (ATIVAN) 0.5 MG tablet  methylphenidate (RITALIN) 10 MG tablet  ondansetron (ZOFRAN) 4 MG tablet  prazosin (MINIPRESS) 2 MG capsule          Review of Systems   All other systems reviewed and are negative.      Physical Exam   BP: 107/75  Pulse: 82  Temp: 97.4  F (36.3  C)  Resp: 18  SpO2: 100 %      Physical Exam  Vitals and nursing note reviewed.   Constitutional:       General: She is not in acute distress.     Appearance: She is well-developed. She is not diaphoretic.   HENT:      Head: Normocephalic and atraumatic.      Jaw: Swelling present. No trismus.        Right Ear: External ear normal.      Left Ear: External ear normal.      Nose: Nose normal.      Mouth/Throat:      Dentition: Abnormal dentition. Dental tenderness, gingival swelling and dental caries present. No dental abscesses.      Pharynx: No oropharyngeal exudate.      Comments: Pt with TTP to left lower molar with gumline erythema and swelling into the jaw. No fluctuance.   Eyes:      General: No scleral icterus.        Right eye: No discharge.         Left eye: No discharge.      Conjunctiva/sclera: Conjunctivae normal.      Pupils: Pupils are equal, round, and reactive to light.   Cardiovascular:      Rate and Rhythm: Normal rate and regular rhythm.      Heart sounds: Normal heart sounds. No  murmur heard.     No friction rub. No gallop.   Pulmonary:      Effort: Pulmonary effort is normal. No respiratory distress.      Breath sounds: Normal breath sounds. No wheezing or rales.   Chest:      Chest wall: No tenderness.   Abdominal:      General: Bowel sounds are normal.      Palpations: Abdomen is soft.      Tenderness: There is no abdominal tenderness.   Musculoskeletal:      Cervical back: Normal range of motion and neck supple.   Lymphadenopathy:      Cervical: No cervical adenopathy.   Skin:     General: Skin is warm and dry.      Coloration: Skin is not pale.      Findings: No erythema or rash.   Neurological:      Mental Status: She is alert and oriented to person, place, and time.      Cranial Nerves: No cranial nerve deficit.      Coordination: Coordination normal.   Psychiatric:         Behavior: Behavior normal.         Thought Content: Thought content normal.         Judgment: Judgment normal.         ED Course        Procedures         No results found for this or any previous visit (from the past 24 hour(s)).    Medications - No data to display    Assessments & Plan (with Medical Decision Making)   Exam consistent with dental infection. Mild jaw swelling but no fluctuance to suggest drainable abscess. RX for Augmentin was provided. She requests toradol at home for pain which was also given. She was discharged home following in stable condition.     Plan:  Take the Augmentin as prescribed for your dental infection.   Take the Toradol as prescribed for pain. (No ibuprofen while taking this).  Follow up with a dentist ASAP.   Return here sooner with any new or worsening symptoms.     I have reviewed the nursing notes.    I have reviewed the findings, diagnosis, plan and need for follow up with the patient.      New Prescriptions    AMOXICILLIN-CLAVULANATE (AUGMENTIN) 875-125 MG TABLET    Take 1 tablet by mouth 2 times daily for 10 days    KETOROLAC (TORADOL) 10 MG TABLET    Take 1 tablet (10 mg)  by mouth every 6 hours as needed for moderate pain       Final diagnoses:   Dental abscess       4/21/2024   [facility]

## 2024-06-07 NOTE — NURSING NOTE
"Chief Complaint   Patient presents with     Surgical Followup     Post op LAVH, bilateral salpingectomy on 3/16/22       Initial BP 96/50 (BP Location: Left arm, Patient Position: Chair, Cuff Size: Adult Regular)   Pulse 81   Temp 97.1  F (36.2  C) (Tympanic)   Ht 1.549 m (5' 1\")   Wt 49 kg (108 lb)   LMP 03/01/2022   SpO2 100%   BMI 20.41 kg/m   Estimated body mass index is 20.41 kg/m  as calculated from the following:    Height as of this encounter: 1.549 m (5' 1\").    Weight as of this encounter: 49 kg (108 lb).  Medication Reconciliation: complete  BECKY LEVIN LPN    " No

## 2024-06-15 ENCOUNTER — HEALTH MAINTENANCE LETTER (OUTPATIENT)
Age: 29
End: 2024-06-15

## 2024-08-06 ENCOUNTER — HOSPITAL ENCOUNTER (EMERGENCY)
Facility: HOSPITAL | Age: 29
Discharge: HOME | End: 2024-08-06
Attending: NURSE PRACTITIONER | Admitting: NURSE PRACTITIONER
Payer: COMMERCIAL

## 2024-08-06 VITALS
TEMPERATURE: 98.6 F | SYSTOLIC BLOOD PRESSURE: 106 MMHG | HEIGHT: 62 IN | RESPIRATION RATE: 14 BRPM | OXYGEN SATURATION: 98 % | HEART RATE: 85 BPM | DIASTOLIC BLOOD PRESSURE: 67 MMHG | WEIGHT: 109.8 LBS | BODY MASS INDEX: 20.2 KG/M2

## 2024-08-06 DIAGNOSIS — K08.89 PAIN, DENTAL: Primary | ICD-10-CM

## 2024-08-06 DIAGNOSIS — T78.40XA ALLERGIC REACTION TO DRUG, INITIAL ENCOUNTER: ICD-10-CM

## 2024-08-06 PROCEDURE — G0463 HOSPITAL OUTPT CLINIC VISIT: HCPCS

## 2024-08-06 PROCEDURE — 99213 OFFICE O/P EST LOW 20 MIN: CPT | Performed by: NURSE PRACTITIONER

## 2024-08-06 RX ORDER — AZITHROMYCIN 250 MG/1
TABLET, FILM COATED ORAL
Qty: 6 TABLET | Refills: 0 | Status: SHIPPED | OUTPATIENT
Start: 2024-08-06 | End: 2024-08-11

## 2024-08-06 ASSESSMENT — ENCOUNTER SYMPTOMS
CHILLS: 0
VOMITING: 0
FEVER: 0
NAUSEA: 0

## 2024-08-06 ASSESSMENT — ACTIVITIES OF DAILY LIVING (ADL): ADLS_ACUITY_SCORE: 37

## 2024-08-06 NOTE — ED PROVIDER NOTES
History     Chief Complaint   Patient presents with    Dental Pain     HPI  Gerald Evans is a 29 year old female who presents to urgent care for evaluation.  Patient tells me that last month, around July 1, she saw her primary doctor for what sounds like it was a dental abscess.  She was prescribed Augmentin but was unable to fill it until July 31 due to lack of insurance.  She is tells me that she took 2 doses and had episodes of emesis.  Currently notes that she has no significant pain but she can still feel a lump along her jaw.  She has taken amoxicillin/penicillins in the past with no similar side effects.  Patient would like her tooth checked out again.  No fever or chills.  No abdominal pain, vomiting or diarrhea anymore.  She has since stopped taking the antibiotic.    Allergies:  Allergies   Allergen Reactions    Amphetamine Aspartate Other (See Comments)     Aggressive  Adderall      Amphetamine Sulfate Other (See Comments)     Aggressive  Adderall    Amphetamine-Dextroamphet Er     Amphetamine-Dextroamphetamine      Other reaction(s): Other - Describe In Comment Field  Gets violent    Clindamycin Nausea    Dextroamphetamine Other (See Comments)     Aggressive  Adderall    Diphenhydramine Other (See Comments)     nightmares    Doxycycline Nausea    Strawberry Extract     Sumatriptan Other (See Comments)     Felt really warm with burning ears    Buspar [Buspirone] Rash    Metrogel [Metronidazole] Rash       Problem List:    Patient Active Problem List    Diagnosis Date Noted    Menorrhagia 03/16/2022     Priority: Medium    Cervical dysplasia 03/16/2022     Priority: Medium    HSIL (high grade squamous intraepithelial lesion) on Pap smear of cervix 08/11/2021     Priority: Medium     Added automatically from request for surgery [id number]      [place]'s duct cyst 07/26/2018     Priority: Medium     Monitor during pregnacy      Papanicolaou smear of cervix with low grade squamous intraepithelial lesion  (LGSIL) 2018     Priority: Medium     Repeat at annual (24)      Dyspareunia in female 2018     Priority: Medium    History of ectopic pregnancy 2018     Priority: Medium    History of  2018     Priority: Medium    History of alcoholism (H) 2018     Priority: Medium    Dysmenorrhea 2018     Priority: Medium    Vaginal bleeding 2016     Priority: Medium     Refusing rhogam despite counseling      Anxiety and depression 2015     Priority: Medium     tools      Loose stools 2015     Priority: Medium    Hidradenitis suppurativa 2013     Priority: Medium    Smoker 2013     Priority: Medium     Trying to quit      Panic disorder without agoraphobia 2013     Priority: Medium     Overview:   [facility]      Migraine with aura 2010     Priority: Medium     Overview:   IMO Update      Oppositional defiant disorder 2008     Priority: Medium     Overview:   IMO Update 10/11      Constipation 09/10/2007     Priority: Medium     Overview:   IMO Update      Attention deficit hyperactivity disorder (ADHD) 2003     Priority: Medium     Overview:   IMO Update 10 2016          Past Medical History:    History reviewed. No pertinent past medical history.    Past Surgical History:    Past Surgical History:   Procedure Laterality Date    BIOPSY OF SKIN LESION  2013     SECTION  2016    [facility].    COLPOSCOPY CERVIX, LOOP ELECTRODE BIOPSY, COMBINED N/A 2021    Procedure: LOOP ELECTROSURGICAL EXCISION PROCEDURE;  Surgeon: Caleb Kulkarni MD;  [facility]    CYSTOSCOPY N/A 3/16/2022    Procedure: Cystoscopy;  Surgeon: Caleb Kulkarni MD;  [facility]    DILATION AND CURETTAGE N/A 2021    Procedure: DILATION AND CURETTAGE, UTERUS;  Surgeon: Caleb Kulkarni MD;  [facility]    ENT SURGERY      adenoidectomy    ENT SURGERY      anesthesia for tooth work    LAPAROSCOPIC ASSISTED HYSTERECTOMY VAGINAL N/A 3/16/2022  "   Procedure: LAPAROSCOPIC ASSISTED VAGINAL HYSTERECTOMY;  Surgeon: Caleb Kulkarni MD;  [facility]    LAPAROSCOPIC SALPINGECTOMY Left 12/27/2014    Procedure: LAPAROSCOPIC SALPINGECTOMY;  Surgeon: Leonel Bethea MD;  [facility]    LAPAROSCOPIC SALPINGECTOMY Right 3/16/2022    Procedure: RIGHT LAPAROSCOPIC SALPINGECTOMY;  Surgeon: Caleb Kulkarni MD;  [facility]       Family History:    Family History   Problem Relation Age of Onset    Heart Disease Mother         stent placement    C.A.D. Mother     Diabetes Paternal Grandmother     Hypertension Paternal Grandmother        Social History:  Marital Status:   [4]  Social History     Tobacco Use    Smoking status: Every Day     Current packs/day: 1.00     Average packs/day: 1 pack/day for 7.0 years (7.0 ttl pk-yrs)     Types: Cigarettes    Smokeless tobacco: Never   Substance Use Topics    Alcohol use: Yes     Comment: occas    Drug use: No        Medications:    amoxicillin-clavulanate (AUGMENTIN) 875-125 MG tablet  azithromycin (ZITHROMAX) 250 MG tablet  docusate sodium (COLACE) 100 MG tablet  hydrOXYzine (ATARAX) 25 MG tablet  ibuprofen (ADVIL/MOTRIN) 800 MG tablet  ketorolac (TORADOL) 10 MG tablet  lamoTRIgine (LAMICTAL) 200 MG tablet  LORazepam (ATIVAN) 0.5 MG tablet  methylphenidate (RITALIN) 10 MG tablet  ondansetron (ZOFRAN) 4 MG tablet  prazosin (MINIPRESS) 2 MG capsule          Review of Systems   Constitutional:  Negative for chills and fever.   HENT:  Positive for dental problem.    Gastrointestinal:  Negative for nausea and vomiting (Resolved).   All other systems reviewed and are negative.      Physical Exam   BP: 106/67  Pulse: 85  Temp: 98.6  F (37  C)  Resp: 14  Height: 157.5 cm (5' 2\")  Weight: 49.8 kg (109 lb 12.8 oz)  SpO2: 98 %      Physical Exam  Vitals and nursing note reviewed.   Constitutional:       Appearance: Normal appearance. She is not ill-appearing or toxic-appearing.   HENT:      Head: Atraumatic.      Jaw: There " is normal jaw occlusion. No trismus.      Comments: Small palpable lump to left jaw area.  No skin erythema.     Mouth/Throat:      Mouth: Mucous membranes are moist.      Dentition: Dental caries present. No dental tenderness, gingival swelling or dental abscesses.      Pharynx: Oropharynx is clear. Uvula midline.        Comments: No palpable abscess.  No significant gingival swelling or rhythm.  Eyes:      Pupils: Pupils are equal, round, and reactive to light.   Cardiovascular:      Rate and Rhythm: Normal rate.      Pulses: Normal pulses.   Pulmonary:      Effort: Pulmonary effort is normal. No respiratory distress.   Abdominal:      Palpations: Abdomen is soft.   Musculoskeletal:      Cervical back: Neck supple.   Skin:     General: Skin is warm and dry.      Coloration: Skin is not pale.   Neurological:      Mental Status: She is alert and oriented to person, place, and time.         ED Course        Procedures         No results found for this or any previous visit (from the past 24 hour(s)).    Medications - No data to display    Assessments & Plan (with Medical Decision Making)   29-year-old female that was started on Augmentin for a dental infection and developed hives to her body along with a couple of episodes of emesis after she took the antibiotic.  Today on evaluation no gingival swelling or erythema was appreciated.  However, there is a small palpable lump to the outside of her face in the left jaw area.  Symptoms feel a little bit better but have not completely resolved.  She did not complete her antibiotics due to the reaction she got.  Patient has tolerated Augmentin and amoxicillin in the past.  She denies exposure or use of anything else that could have caused similar symptoms.    Opted to switch her antibiotic out of caution.  However patient also has allergies to clindamycin and Flagyl.  Will switch her to azithromycin.  Recommended cold compresses to the face.  Advised her to closely  follow-up with her dentist for evaluation of teeth.  Return to urgent care Emergency Department for any worsening or concerning symptoms.    I have reviewed the nursing notes.    I have reviewed the findings, diagnosis, plan and need for follow up with the patient.  This document was prepared using a combination of typing and voice generated software.  While every attempt was made for accuracy, spelling and grammatical errors may exist.         New Prescriptions    AZITHROMYCIN (ZITHROMAX) 250 MG TABLET    Take 2 tablets (500 mg) by mouth daily for 1 day, THEN 1 tablet (250 mg) daily for 4 days.       Final diagnoses:   Pain, dental   Allergic reaction to drug, initial encounter       8/6/2024   [facility]       Mpofu, Pattie, CNP  08/06/24 [id number]

## 2024-08-06 NOTE — ED TRIAGE NOTES
Pt presents with c/o a lump on the left lower side of mouth. Sx started a couple weeks ago. States that it is not painful. Endorses having a primary dentist. No otc meds.

## 2024-08-06 NOTE — ED TRIAGE NOTES
HAY Martini CNP assessed patient in triage and determined patient Urgent Care appropriate. Will be seen in Urgent Care.

## 2024-08-06 NOTE — DISCHARGE INSTRUCTIONS
No significant signs of infection to your teeth at this time.  I switch you to a different antibiotic.  I do some cool compresses to the affected area where you still feel the lump.    Follow-up with a dentist for evaluation of your teeth.  Return to urgent care or emergency room for any worsening or concerning symptoms.

## 2024-11-04 ENCOUNTER — HOSPITAL ENCOUNTER (EMERGENCY)
Facility: HOSPITAL | Age: 29
Discharge: HOME | End: 2024-11-04
Attending: PHYSICIAN ASSISTANT | Admitting: PHYSICIAN ASSISTANT
Payer: COMMERCIAL

## 2024-11-04 VITALS
SYSTOLIC BLOOD PRESSURE: 110 MMHG | DIASTOLIC BLOOD PRESSURE: 67 MMHG | OXYGEN SATURATION: 99 % | RESPIRATION RATE: 19 BRPM | TEMPERATURE: 98.3 F | HEART RATE: 83 BPM

## 2024-11-04 DIAGNOSIS — N76.0 BV (BACTERIAL VAGINOSIS): ICD-10-CM

## 2024-11-04 DIAGNOSIS — A59.9 TRICHOMONIASIS: ICD-10-CM

## 2024-11-04 DIAGNOSIS — B96.89 BV (BACTERIAL VAGINOSIS): ICD-10-CM

## 2024-11-04 LAB
ALBUMIN UR-MCNC: NEGATIVE MG/DL
APPEARANCE UR: CLEAR
BACTERIAL VAGINOSIS VAG-IMP: POSITIVE
BILIRUB UR QL STRIP: NEGATIVE
C TRACH DNA SPEC QL PROBE+SIG AMP: NEGATIVE
CANDIDA DNA VAG QL NAA+PROBE: NOT DETECTED
CANDIDA GLABRATA / CANDIDA KRUSEI DNA: NOT DETECTED
COLOR UR AUTO: ABNORMAL
GLUCOSE UR STRIP-MCNC: NEGATIVE MG/DL
HGB UR QL STRIP: NEGATIVE
KETONES UR STRIP-MCNC: NEGATIVE MG/DL
LEUKOCYTE ESTERASE UR QL STRIP: ABNORMAL
MUCOUS THREADS #/AREA URNS LPF: PRESENT /LPF
N GONORRHOEA DNA SPEC QL NAA+PROBE: NEGATIVE
NITRATE UR QL: NEGATIVE
PH UR STRIP: 5.5 [PH] (ref 4.7–8)
RBC URINE: 2 /HPF
SP GR UR STRIP: 1.02 (ref 1–1.03)
SQUAMOUS EPITHELIAL: 1 /HPF
T VAGINALIS DNA VAG QL NAA+PROBE: DETECTED
UROBILINOGEN UR STRIP-MCNC: NORMAL MG/DL
WBC URINE: 9 /HPF

## 2024-11-04 PROCEDURE — 87086 URINE CULTURE/COLONY COUNT: CPT | Performed by: PHYSICIAN ASSISTANT

## 2024-11-04 PROCEDURE — 81003 URINALYSIS AUTO W/O SCOPE: CPT | Performed by: PHYSICIAN ASSISTANT

## 2024-11-04 PROCEDURE — 0352U MULTIPLEX VAGINAL PANEL BY PCR: CPT | Performed by: PHYSICIAN ASSISTANT

## 2024-11-04 PROCEDURE — 87491 CHLMYD TRACH DNA AMP PROBE: CPT | Performed by: PHYSICIAN ASSISTANT

## 2024-11-04 PROCEDURE — G0463 HOSPITAL OUTPT CLINIC VISIT: HCPCS

## 2024-11-04 PROCEDURE — 99213 OFFICE O/P EST LOW 20 MIN: CPT | Performed by: PHYSICIAN ASSISTANT

## 2024-11-04 RX ORDER — NITROFURANTOIN 25; 75 MG/1; MG/1
100 CAPSULE ORAL 2 TIMES DAILY
Qty: 6 CAPSULE | Refills: 0 | Status: SHIPPED | OUTPATIENT
Start: 2024-11-04

## 2024-11-04 RX ORDER — TINIDAZOLE 250 MG/1
2000 TABLET ORAL DAILY
Qty: 16 TABLET | Refills: 0 | Status: SHIPPED | OUTPATIENT
Start: 2024-11-04 | End: 2024-11-05

## 2024-11-04 RX ORDER — NITROFURANTOIN 25; 75 MG/1; MG/1
100 CAPSULE ORAL ONCE
Status: DISCONTINUED | OUTPATIENT
Start: 2024-11-04 | End: 2024-11-04

## 2024-11-04 ASSESSMENT — COLUMBIA-SUICIDE SEVERITY RATING SCALE - C-SSRS
6. HAVE YOU EVER DONE ANYTHING, STARTED TO DO ANYTHING, OR PREPARED TO DO ANYTHING TO END YOUR LIFE?: NO
2. HAVE YOU ACTUALLY HAD ANY THOUGHTS OF KILLING YOURSELF IN THE PAST MONTH?: NO
1. IN THE PAST MONTH, HAVE YOU WISHED YOU WERE DEAD OR WISHED YOU COULD GO TO SLEEP AND NOT WAKE UP?: NO

## 2024-11-04 ASSESSMENT — ACTIVITIES OF DAILY LIVING (ADL): ADLS_ACUITY_SCORE: 0

## 2024-11-04 NOTE — ED PROVIDER NOTES
History     Chief Complaint   Patient presents with    Dysuria     HPI  Gerald Evans is a 29 year old female who presents for vaginal discharge and burning with urination.  Patient does have some concern for STI.  No foul smell no pelvic pain no abdominal pain no fevers no chills no nausea vomiting.  No diarrhea.    Allergies:  Allergies   Allergen Reactions    Amphetamine Aspartate Other (See Comments)     Aggressive  Adderall      Amphetamine Sulfate Other (See Comments)     Aggressive  Adderall    Amphetamine-Dextroamphet Er     Amphetamine-Dextroamphetamine      Other reaction(s): Other - Describe In Comment Field  Gets violent    Clindamycin Nausea    Dextroamphetamine Other (See Comments)     Aggressive  Adderall    Diphenhydramine Other (See Comments)     nightmares    Doxycycline Nausea    Strawberry Extract     Sumatriptan Other (See Comments)     Felt really warm with burning ears    Buspar [Buspirone] Rash    Metrogel [Metronidazole] Rash       Problem List:    Patient Active Problem List    Diagnosis Date Noted    Menorrhagia 2022     Priority: Medium    Cervical dysplasia 2022     Priority: Medium    HSIL (high grade squamous intraepithelial lesion) on Pap smear of cervix 2021     Priority: Medium     Added automatically from request for surgery [id number]      [place]'s duct cyst 2018     Priority: Medium     Monitor during pregnacy      Papanicolaou smear of cervix with low grade squamous intraepithelial lesion (LGSIL) 2018     Priority: Medium     Repeat at annual (24)      Dyspareunia in female 2018     Priority: Medium    History of ectopic pregnancy 2018     Priority: Medium    History of  2018     Priority: Medium    History of alcoholism (H) 2018     Priority: Medium    Dysmenorrhea 2018     Priority: Medium    Vaginal bleeding 2016     Priority: Medium     Refusing rhogam despite counseling      Anxiety and depression  2015     Priority: Medium     tools      Loose stools 2015     Priority: Medium    Hidradenitis suppurativa 2013     Priority: Medium    Smoker 2013     Priority: Medium     Trying to quit      Panic disorder without agoraphobia 2013     Priority: Medium     Overview:   [facility]      Migraine with aura 2010     Priority: Medium     Overview:   IMO Update      Oppositional defiant disorder 2008     Priority: Medium     Overview:   IMO Update 10/11      Constipation 09/10/2007     Priority: Medium     Overview:   IMO Update      Attention deficit hyperactivity disorder (ADHD) 2003     Priority: Medium     Overview:   IMO Update 10 2016          Past Medical History:    No past medical history on file.    Past Surgical History:    Past Surgical History:   Procedure Laterality Date    BIOPSY OF SKIN LESION       SECTION  2016    [facility].    COLPOSCOPY CERVIX, LOOP ELECTRODE BIOPSY, COMBINED N/A 2021    Procedure: LOOP ELECTROSURGICAL EXCISION PROCEDURE;  Surgeon: Caleb Kulkarni MD;  Location: [facility]    CYSTOSCOPY N/A 3/16/2022    Procedure: Cystoscopy;  Surgeon: Caleb Kulkarni MD;  [facility]: [facility]    DILATION AND CURETTAGE N/A 2021    Procedure: DILATION AND CURETTAGE, UTERUS;  Surgeon: Caleb Kulkarni MD;  [facility]: [facility]    ENT SURGERY      adenoidectomy    ENT SURGERY      anesthesia for tooth work    LAPAROSCOPIC ASSISTED HYSTERECTOMY VAGINAL N/A 3/16/2022    Procedure: LAPAROSCOPIC ASSISTED VAGINAL HYSTERECTOMY;  Surgeon: Caleb Kulkarni MD;  [facility]: [facility]    LAPAROSCOPIC SALPINGECTOMY Left 2014    Procedure: LAPAROSCOPIC SALPINGECTOMY;  Surgeon: Leonel Bethea MD;  [facility]: [facility]    LAPAROSCOPIC SALPINGECTOMY Right 3/16/2022    Procedure: RIGHT LAPAROSCOPIC SALPINGECTOMY;  Surgeon: Caleb Kulkarni MD;  [facility]: [facility]       Family History:    Family History   Problem Relation Age of Onset    Heart Disease Mother         stent  placement    C.A.SANTO. Mother     Diabetes Paternal Grandmother     Hypertension Paternal Grandmother        Social History:  Marital Status:   [4]  Social History     Tobacco Use    Smoking status: Every Day     Current packs/day: 1.00     Average packs/day: 1 pack/day for 7.0 years (7.0 ttl pk-yrs)     Types: Cigarettes    Smokeless tobacco: Never   Substance Use Topics    Alcohol use: Yes     Comment: occas    Drug use: No        Medications:    nitroFURantoin macrocrystal-monohydrate (MACROBID) 100 MG capsule  tinidazole (TINDAMAX) 250 MG tablet  amoxicillin-clavulanate (AUGMENTIN) 875-125 MG tablet  docusate sodium (COLACE) 100 MG tablet  hydrOXYzine (ATARAX) 25 MG tablet  ibuprofen (ADVIL/MOTRIN) 800 MG tablet  ketorolac (TORADOL) 10 MG tablet  lamoTRIgine (LAMICTAL) 200 MG tablet  LORazepam (ATIVAN) 0.5 MG tablet  methylphenidate (RITALIN) 10 MG tablet  ondansetron (ZOFRAN) 4 MG tablet  prazosin (MINIPRESS) 2 MG capsule          Review of Systems   All other systems reviewed and are negative.      Physical Exam   BP: 110/67  Pulse: 83  Temp: 98.3  F (36.8  C)  Resp: 19  SpO2: 99 %      Physical Exam  Constitutional:       General: She is not in acute distress.     Appearance: Normal appearance. She is normal weight. She is not ill-appearing, toxic-appearing or diaphoretic.   HENT:      Head: Normocephalic and atraumatic.      Right Ear: External ear normal.      Left Ear: External ear normal.   Eyes:      Extraocular Movements: Extraocular movements intact.      Conjunctiva/sclera: Conjunctivae normal.      Pupils: Pupils are equal, round, and reactive to light.   Cardiovascular:      Rate and Rhythm: Normal rate.   Pulmonary:      Effort: Pulmonary effort is normal. No respiratory distress.   Musculoskeletal:         General: Normal range of motion.   Skin:     General: Skin is warm and dry.      Coloration: Skin is not jaundiced or pale.   Neurological:      Mental Status: She is alert and oriented to  person, place, and time. Mental status is at baseline.      Cranial Nerves: No cranial nerve deficit.   Psychiatric:         Mood and Affect: Mood normal.         ED Course      GC chlamydia vaginitis panel obtained.  UA does show signs of UTI we will start her on Macrobid.  Patient's vaginitis did come back later with trichomonas as well as bacterial vaginosis.  Patient will be treated with tinidazole as she has an allergy to Flagyl and this will be a 2-day course versus 7-day course of Flagyl.  She is to take Benadryl prior to taking these medications.  GC chlamydia were negative.  She is instructed to discuss with her partners and have them get treated..  Procedures           Results for orders placed or performed during the hospital encounter of 11/04/24 (from the past 24 hours)   UA with Microscopic reflex to Culture    Specimen: Urine, Clean Catch   Result Value Ref Range    Color Urine Light Yellow Colorless, Straw, Light Yellow, Yellow    Appearance Urine Clear Clear    Glucose Urine Negative Negative mg/dL    Bilirubin Urine Negative Negative    Ketones Urine Negative Negative mg/dL    Specific Gravity Urine 1.021 1.003 - 1.035    Blood Urine Negative Negative    pH Urine 5.5 4.7 - 8.0    Protein Albumin Urine Negative Negative mg/dL    Urobilinogen Urine Normal Normal, 2.0 mg/dL    Nitrite Urine Negative Negative    Leukocyte Esterase Urine Moderate (A) Negative    Mucus Urine Present (A) None Seen /LPF    RBC Urine 2 <=2 /HPF    WBC Urine 9 (H) <=5 /HPF    Squamous Epithelials Urine 1 <=1 /HPF    Narrative    Urine Culture ordered based on laboratory criteria   Multiplex Vaginal Panel by PCR    Specimen: Vagina; Swab   Result Value Ref Range    Bacterial Vaginosis Organism DNA Positive (A) Negative    Candida Group DNA Not Detected Not Detected    Candida glabrata / Bambi krusei DNA Not Detected Not Detected    Trichomonas vaginalis DNA Detected (A) Not Detected    Narrative    The Xpert  Xpress MVP  test, performed on the [employer], is an automated, qualitative in vitro diagnostic test for the detection of DNA targets from anaerobic bacteria associated with bacterial vaginosis, Candida species associated with vulvovaginal candidiasis, and Trichomonas vaginalis. The assay uses clinician-collected and self-collected vaginal swabs from patients who are symptomatic for vaginitis/ vaginosis. The Xpert  Xpress MVP test utilizes real-time polymerase chain reaction (PCR) for the amplification of specific DNA targets and utilizes fluorogenic target-specific hybridization probes to detect and differentiate DNA. It is intended to aid in the diagnosis of vaginal infections in women with a clinical presentation consistent with bacterial vaginosis, vulvovaginal candidiasis, or trichomoniasis.   The assay targets three anaerobic microorgansims that are associated with bacterial vaginosis (BV). Other organisms that are not detected by the Xpert  Xpress MVP test have also been reported to be associated with BV. The BV organism and Candida species targets of the Xpert  Xpress MVP test can be commensal in women; positive results must be considered in conjunction with other clinical and patient information to determine the disease status.   Chlamydia trachomatis/Neisseria gonorrhoeae by PCR    Specimen: Urine, Voided   Result Value Ref Range    Chlamydia Trachomatis Negative Negative    Neisseria gonorrhoeae Negative Negative    Narrative    Assay performed using Nor1 real-time, reverse-transcriptase PCR.       Medications - No data to display      Assessments & Plan (with Medical Decision Making)     I have reviewed the nursing notes.    I have reviewed the findings, diagnosis, plan and need for follow up with the patient.        Discharge Medication List as of 11/4/2024  5:41 PM        START taking these medications    Details   nitroFURantoin macrocrystal-monohydrate (MACROBID) 100 MG capsule Take 1  capsule (100 mg) by mouth 2 times daily., Disp-6 capsule, R-0, E-Prescribe             Final diagnoses:   BV (bacterial vaginosis)   Trichomoniasis       11/4/2024   [facility]       Dwayne Cid PA-C  11/04/24 [id number]

## 2024-11-04 NOTE — ED TRIAGE NOTES
KWAME Flores CNP assessed patient in triage and determined patient Urgent Care appropriate. Will be seen in Urgent Care.

## 2024-11-05 ENCOUNTER — TELEPHONE (OUTPATIENT)
Dept: EMERGENCY MEDICINE | Facility: HOSPITAL | Age: 29
End: 2024-11-05

## 2024-11-05 RX ORDER — METRONIDAZOLE 500 MG/1
500 TABLET ORAL 2 TIMES DAILY
Qty: 14 TABLET | Refills: 0 | Status: SHIPPED | OUTPATIENT
Start: 2024-11-05 | End: 2024-11-12

## 2024-11-05 NOTE — TELEPHONE ENCOUNTER
Received PA request from 's regarding tinidazole (TINDAMAX) 250 MG tablet with an APPROVAL from [employer]

## 2024-11-05 NOTE — ED PROVIDER NOTES
Gerald contacted the  stating that the tinidazole she was prescribed is not covered by insurance. Has tolerated oral flagyl in 2018, history of rash from Metrogel. She would like to try the oral flagyl. Discussed avoiding alcohol while taking. She is in agreement with plan.      Chuyita Flores NP  11/05/24 0610

## 2024-11-08 LAB — BACTERIA UR CULT: NORMAL

## 2024-12-15 ENCOUNTER — HOSPITAL ENCOUNTER (EMERGENCY)
Facility: HOSPITAL | Age: 29
Discharge: HOME | End: 2024-12-15
Payer: COMMERCIAL

## 2024-12-15 VITALS
BODY MASS INDEX: 20.61 KG/M2 | DIASTOLIC BLOOD PRESSURE: 71 MMHG | HEART RATE: 96 BPM | RESPIRATION RATE: 18 BRPM | OXYGEN SATURATION: 100 % | WEIGHT: 112 LBS | TEMPERATURE: 97.8 F | SYSTOLIC BLOOD PRESSURE: 114 MMHG | HEIGHT: 62 IN

## 2024-12-15 DIAGNOSIS — H65.91 MIDDLE EAR EFFUSION, RIGHT: ICD-10-CM

## 2024-12-15 PROCEDURE — 99213 OFFICE O/P EST LOW 20 MIN: CPT

## 2024-12-15 PROCEDURE — G0463 HOSPITAL OUTPT CLINIC VISIT: HCPCS

## 2024-12-15 ASSESSMENT — ENCOUNTER SYMPTOMS
ACTIVITY CHANGE: 0
APPETITE CHANGE: 0
FEVER: 0

## 2024-12-15 NOTE — ED TRIAGE NOTES
Pt presents with concerns of right ear pain starting a few days ago. Pt reports ear ringing and fullness

## 2024-12-15 NOTE — DISCHARGE INSTRUCTIONS
Right now, the fluid behind your ear drum is not infected. You can continue with Claritin. I would recommend flonase nasal spray one spray to each nostril once daily for 5-7 days and sudafed 30-60mg every 6 hours as needed to help reduce the fluids.   Tylenol and ibuprofen as directed if needed.   Follow up in the clinic for a recheck.   Return with any new or concerning symptoms.

## 2024-12-15 NOTE — Clinical Note
Gerald Bonelinh was seen and treated in our emergency department on 12/15/2024.  She may return to work on 12/17/2024.       If you have any questions or concerns, please don't hesitate to call.      Chuyita Flores, NP

## 2024-12-15 NOTE — ED PROVIDER NOTES
History     Chief Complaint   Patient presents with    Otalgia     HPI  Gerald Evans is a 29 year old female who presents to the urgent care with a 3 day history of right ear pain with ringing. Has also had some congestion and sinus pressure. She denies fevers. Has been taking Claritin and tylenol/ibuprofen without relief.     Allergies:  Allergies   Allergen Reactions    Amphetamine Aspartate Other (See Comments)     Aggressive  Adderall      Amphetamine Sulfate Other (See Comments)     Aggressive  Adderall    Amphetamine-Dextroamphet Er     Amphetamine-Dextroamphetamine      Other reaction(s): Other - Describe In Comment Field  Gets violent    Clindamycin Nausea    Dextroamphetamine Other (See Comments)     Aggressive  Adderall    Diphenhydramine Other (See Comments)     nightmares    Doxycycline Nausea    Strawberry Extract     Sumatriptan Other (See Comments)     Felt really warm with burning ears    Buspar [Buspirone] Rash    Metrogel [Metronidazole] Rash       Problem List:    Patient Active Problem List    Diagnosis Date Noted    Menorrhagia 2022     Priority: Medium    Cervical dysplasia 2022     Priority: Medium    HSIL (high grade squamous intraepithelial lesion) on Pap smear of cervix 2021     Priority: Medium     Added automatically from request for surgery [id number]      [place]'s duct cyst 2018     Priority: Medium     Monitor during pregnacy      Papanicolaou smear of cervix with low grade squamous intraepithelial lesion (LGSIL) 2018     Priority: Medium     Repeat at annual (24)      Dyspareunia in female 2018     Priority: Medium    History of ectopic pregnancy 2018     Priority: Medium    History of  2018     Priority: Medium    History of alcoholism (H) 2018     Priority: Medium    Dysmenorrhea 2018     Priority: Medium    Vaginal bleeding 2016     Priority: Medium     Refusing rhogam despite counseling      Anxiety and  depression 2015     Priority: Medium     tools      Loose stools 2015     Priority: Medium    Hidradenitis suppurativa 2013     Priority: Medium    Smoker 2013     Priority: Medium     Trying to quit      Panic disorder without agoraphobia 2013     Priority: Medium     Overview:   [facility]      Migraine with aura 2010     Priority: Medium     Overview:   IMO Update      Oppositional defiant disorder 2008     Priority: Medium     Overview:   IMO Update 10/11      Constipation 09/10/2007     Priority: Medium     Overview:   IMO Update      Attention deficit hyperactivity disorder (ADHD) 2003     Priority: Medium     Overview:   IMO Update 10 2016          Past Medical History:    No past medical history on file.    Past Surgical History:    Past Surgical History:   Procedure Laterality Date    BIOPSY OF SKIN LESION  2013     SECTION  2016    [facility].    COLPOSCOPY CERVIX, LOOP ELECTRODE BIOPSY, COMBINED N/A 2021    Procedure: LOOP ELECTROSURGICAL EXCISION PROCEDURE;  Surgeon: Caleb Kulkarni MD;  [facility]    CYSTOSCOPY N/A 3/16/2022    Procedure: Cystoscopy;  Surgeon: Caleb Kulkarni MD;  [facility]    DILATION AND CURETTAGE N/A 2021    Procedure: DILATION AND CURETTAGE, UTERUS;  Surgeon: Caleb Kulkarni MD;  [facility]    ENT SURGERY      adenoidectomy    ENT SURGERY      anesthesia for tooth work    LAPAROSCOPIC ASSISTED HYSTERECTOMY VAGINAL N/A 3/16/2022    Procedure: LAPAROSCOPIC ASSISTED VAGINAL HYSTERECTOMY;  Surgeon: Caleb Kulkarni MD;  [facility]    LAPAROSCOPIC SALPINGECTOMY Left 2014    Procedure: LAPAROSCOPIC SALPINGECTOMY;  Surgeon: Leonel Bethea MD;  [facility]    LAPAROSCOPIC SALPINGECTOMY Right 3/16/2022    Procedure: RIGHT LAPAROSCOPIC SALPINGECTOMY;  Surgeon: Caleb Kulkarni MD;  [facility]: [facility]       Family History:    Family History   Problem Relation Age of Onset    Heart Disease Mother          "stent placement    C.AROSI Mother     Diabetes Paternal Grandmother     Hypertension Paternal Grandmother        Social History:  Marital Status:   [4]  Social History     Tobacco Use    Smoking status: Every Day     Current packs/day: 1.00     Average packs/day: 1 pack/day for 7.0 years (7.0 ttl pk-yrs)     Types: Cigarettes    Smokeless tobacco: Never   Substance Use Topics    Alcohol use: Yes     Comment: occas    Drug use: No        Medications:    lamoTRIgine (LAMICTAL) 200 MG tablet  amoxicillin-clavulanate (AUGMENTIN) 875-125 MG tablet  docusate sodium (COLACE) 100 MG tablet  hydrOXYzine (ATARAX) 25 MG tablet  ibuprofen (ADVIL/MOTRIN) 800 MG tablet  ketorolac (TORADOL) 10 MG tablet  LORazepam (ATIVAN) 0.5 MG tablet  methylphenidate (RITALIN) 10 MG tablet  nitroFURantoin macrocrystal-monohydrate (MACROBID) 100 MG capsule  ondansetron (ZOFRAN) 4 MG tablet  prazosin (MINIPRESS) 2 MG capsule          Review of Systems   Constitutional:  Negative for activity change, appetite change and fever.   HENT:  Positive for congestion and ear pain.    All other systems reviewed and are negative.      Physical Exam   BP: 114/71  Pulse: 96  Temp: 97.8  F (36.6  C)  Resp: 18  Height: 157.5 cm (5' 2\")  Weight: 50.8 kg (112 lb)  SpO2: 100 %      Physical Exam  Vitals and nursing note reviewed.   Constitutional:       General: She is not in acute distress.     Appearance: Normal appearance. She is not ill-appearing or toxic-appearing.   HENT:      Right Ear: A middle ear effusion (non purulent) is present. Tympanic membrane is bulging. Tympanic membrane is not injected or erythematous.      Left Ear: Tympanic membrane is not injected, erythematous or bulging.      Mouth/Throat:      Mouth: Mucous membranes are moist.      Pharynx: Oropharynx is clear. No oropharyngeal exudate or posterior oropharyngeal erythema.   Cardiovascular:      Rate and Rhythm: Normal rate and regular rhythm.      Heart sounds: Normal heart sounds. "   Pulmonary:      Effort: Pulmonary effort is normal.      Breath sounds: Normal breath sounds. No wheezing, rhonchi or rales.   Neurological:      Mental Status: She is alert.         ED Course        Procedures       No results found for this or any previous visit (from the past 24 hours).    Medications - No data to display    Assessments & Plan (with Medical Decision Making)     I have reviewed the nursing notes.    I have reviewed the findings, diagnosis, plan and need for follow up with the patient.  Gerald Evans is a 29 year old female who presents to the urgent care with a 3 day history of right ear pain with ringing. Has also had some congestion and sinus pressure. She denies fevers. Has been taking Claritin and tylenol/ibuprofen without relief.     MDM: vital signs normal, afebrile. Non toxic in appearance with no noted distress. Lungs clear, heart tones regular. Non purulent effusion to right ear. Discussed flonase and sudafed. Encouraged close follow up. Supportive measures and return precautions discussed. She is in agreement with plan.     (H65.91) Middle ear effusion, right  Plan: Right now, the fluid behind your ear drum is not infected. You can continue with Claritin. I would recommend flonase nasal spray one spray to each nostril once daily for 5-7 days and sudafed 30-60mg every 6 hours as needed to help reduce the fluids.   Tylenol and ibuprofen as directed if needed.   Follow up in the clinic for a recheck.   Return with any new or concerning symptoms. Understanding verbalized.      Discharge Medication List as of 12/15/2024 12:53 PM          Final diagnoses:   Middle ear effusion, right       12/15/2024   [facility]       Chuyita Flores NP  12/15/24 [id number]

## 2024-12-20 ENCOUNTER — HOSPITAL ENCOUNTER (EMERGENCY)
Facility: HOSPITAL | Age: 29
Discharge: HOME | End: 2024-12-20
Attending: NURSE PRACTITIONER
Payer: COMMERCIAL

## 2024-12-20 VITALS
OXYGEN SATURATION: 99 % | HEART RATE: 98 BPM | RESPIRATION RATE: 18 BRPM | HEIGHT: 62 IN | BODY MASS INDEX: 20.98 KG/M2 | DIASTOLIC BLOOD PRESSURE: 73 MMHG | WEIGHT: 114 LBS | SYSTOLIC BLOOD PRESSURE: 104 MMHG | TEMPERATURE: 97.2 F

## 2024-12-20 DIAGNOSIS — K08.89 PAIN, DENTAL: Primary | ICD-10-CM

## 2024-12-20 PROCEDURE — 99213 OFFICE O/P EST LOW 20 MIN: CPT | Performed by: NURSE PRACTITIONER

## 2024-12-20 PROCEDURE — G0463 HOSPITAL OUTPT CLINIC VISIT: HCPCS

## 2024-12-20 RX ORDER — PREDNISONE 20 MG/1
40 TABLET ORAL DAILY
Qty: 10 TABLET | Refills: 0 | Status: SHIPPED | OUTPATIENT
Start: 2024-12-20 | End: 2024-12-25

## 2024-12-20 RX ORDER — AZITHROMYCIN 250 MG/1
TABLET, FILM COATED ORAL
Qty: 6 TABLET | Refills: 0 | Status: SHIPPED | OUTPATIENT
Start: 2024-12-20 | End: 2024-12-25

## 2024-12-20 ASSESSMENT — ACTIVITIES OF DAILY LIVING (ADL): ADLS_ACUITY_SCORE: 42

## 2024-12-20 ASSESSMENT — ENCOUNTER SYMPTOMS: FEVER: 0

## 2024-12-20 NOTE — ED PROVIDER NOTES
History     Chief Complaint   Patient presents with    Dental Pain     HPI  Gerald Evans is a 29 year old female who presents to urgent care with concerns of dental infection.  In the last 1 to 2 days patient has has had left jaw swelling and left lower tooth pain.  I saw this patient 4 months ago for a dental infection.  At that time patient had been prescribed Augmentin and stated that she was throwing up with Augmentin.  I switched her to azithromycin and she tells me that her dental infection resolved.  Reports history of a broken tooth with injury having occurred while she was getting another tooth pulled at the dentist office.  She used to go to [facility] in [place] but has not seen them for a while.    The patient is concerned that she has another tooth infection and is requesting azithromycin as this worked better the last time.    Allergies:  Allergies   Allergen Reactions    Amphetamine Aspartate Other (See Comments)     Aggressive  Adderall      Amphetamine Sulfate Other (See Comments)     Aggressive  Adderall    Amphetamine-Dextroamphet Er     Amphetamine-Dextroamphetamine      Other reaction(s): Other - Describe In Comment Field  Gets violent    Clindamycin Nausea    Dextroamphetamine Other (See Comments)     Aggressive  Adderall    Diphenhydramine Other (See Comments)     nightmares    Doxycycline Nausea    Strawberry Extract     Sumatriptan Other (See Comments)     Felt really warm with burning ears    Buspar [Buspirone] Rash    Metrogel [Metronidazole] Rash       Problem List:    Patient Active Problem List    Diagnosis Date Noted    Menorrhagia 03/16/2022     Priority: Medium    Cervical dysplasia 03/16/2022     Priority: Medium    HSIL (high grade squamous intraepithelial lesion) on Pap smear of cervix 08/11/2021     Priority: Medium     Added automatically from request for surgery [id number]      [place]'s duct cyst 07/26/2018     Priority: Medium     Monitor during pregnacy       Papanicolaou smear of cervix with low grade squamous intraepithelial lesion (LGSIL) 2018     Priority: Medium     Repeat at annual (24)      Dyspareunia in female 2018     Priority: Medium    History of ectopic pregnancy 2018     Priority: Medium    History of  2018     Priority: Medium    History of alcoholism (H) 2018     Priority: Medium    Dysmenorrhea 2018     Priority: Medium    Vaginal bleeding 2016     Priority: Medium     Refusing rhogam despite counseling      Anxiety and depression 2015     Priority: Medium     tools      Loose stools 2015     Priority: Medium    Hidradenitis suppurativa 2013     Priority: Medium    Smoker 2013     Priority: Medium     Trying to quit      Panic disorder without agoraphobia 2013     Priority: Medium     Overview:   [facility]      Migraine with aura 2010     Priority: Medium     Overview:   IMO Update      Oppositional defiant disorder 2008     Priority: Medium     Overview:   IMO Update 10/11      Constipation 09/10/2007     Priority: Medium     Overview:   IMO Update      Attention deficit hyperactivity disorder (ADHD) 2003     Priority: Medium     Overview:   IMO Update 10 2016          Past Medical History:    No past medical history on file.    Past Surgical History:    Past Surgical History:   Procedure Laterality Date    BIOPSY OF SKIN LESION  2013     SECTION  2016    [facility].    COLPOSCOPY CERVIX, LOOP ELECTRODE BIOPSY, COMBINED N/A 2021    Procedure: LOOP ELECTROSURGICAL EXCISION PROCEDURE;  Surgeon: Claeb Kulkarni MD;  Location: [facility]    CYSTOSCOPY N/A 3/16/2022    Procedure: Cystoscopy;  Surgeon: Caleb Kulkarni MD;  [facility]    DILATION AND CURETTAGE N/A 2021    Procedure: DILATION AND CURETTAGE, UTERUS;  Surgeon: Caleb Kulkarni MD;  Location: [facility]    ENT SURGERY      adenoidectomy    ENT SURGERY      anesthesia for tooth work     "LAPAROSCOPIC ASSISTED HYSTERECTOMY VAGINAL N/A 3/16/2022    Procedure: LAPAROSCOPIC ASSISTED VAGINAL HYSTERECTOMY;  Surgeon: Caleb Kulkarni MD;  [facility]: [facility]    LAPAROSCOPIC SALPINGECTOMY Left 12/27/2014    Procedure: LAPAROSCOPIC SALPINGECTOMY;  Surgeon: Leonel Bethea MD;  [facility]    LAPAROSCOPIC SALPINGECTOMY Right 3/16/2022    Procedure: RIGHT LAPAROSCOPIC SALPINGECTOMY;  Surgeon: Caleb Kulkarni MD;  Location: [facility]       Family History:    Family History   Problem Relation Age of Onset    Heart Disease Mother         stent placement    C.A.D. Mother     Diabetes Paternal Grandmother     Hypertension Paternal Grandmother        Social History:  Marital Status:   [4]  Social History     Tobacco Use    Smoking status: Every Day     Current packs/day: 1.00     Average packs/day: 1 pack/day for 7.0 years (7.0 ttl pk-yrs)     Types: Cigarettes    Smokeless tobacco: Never   Substance Use Topics    Alcohol use: Yes     Comment: occas    Drug use: No        Medications:    lamoTRIgine (LAMICTAL) 200 MG tablet  LORazepam (ATIVAN) 0.5 MG tablet  methylphenidate (RITALIN) 10 MG tablet  azithromycin (ZITHROMAX) 250 MG tablet  docusate sodium (COLACE) 100 MG tablet  hydrOXYzine (ATARAX) 25 MG tablet  ibuprofen (ADVIL/MOTRIN) 800 MG tablet  ondansetron (ZOFRAN) 4 MG tablet  prazosin (MINIPRESS) 2 MG capsule  predniSONE (DELTASONE) 20 MG tablet          Review of Systems   Constitutional:  Negative for fever.   HENT:  Positive for dental problem.    All other systems reviewed and are negative.      Physical Exam   BP: 104/73  Pulse: 98  Temp: 97.2  F (36.2  C)  Resp: 18  Height: 157.5 cm (5' 2\")  Weight: 51.7 kg (114 lb)  SpO2: 99 %      Physical Exam  Vitals and nursing note reviewed.   Constitutional:       Appearance: Normal appearance. She is not ill-appearing or toxic-appearing.   HENT:      Head: Atraumatic.      Jaw: Tenderness present. No trismus or pain on movement.      Mouth/Throat:      " Dentition: Dental tenderness and gingival swelling present.      Pharynx: Oropharynx is clear. Uvula midline.     Eyes:      Pupils: Pupils are equal, round, and reactive to light.   Cardiovascular:      Rate and Rhythm: Normal rate.   Pulmonary:      Effort: Pulmonary effort is normal. No respiratory distress.   Skin:     Coloration: Skin is not pale.   Neurological:      Mental Status: She is alert and oriented to person, place, and time.         ED Course        Procedures       No results found for this or any previous visit (from the past 24 hours).    Medications - No data to display    Assessments & Plan (with Medical Decision Making)   29-year-old female with left lower dental pain and mild gingival swelling over the last couple of days.  Previously treated for dental infection with azithromycin after she had a reaction to Augmentin .  Does not have a dentist currently.  No obvious dental abscess appreciated.  Will treat her with azithromycin as well as prednisone to help with inflammation.  Encouraged her to follow-up with a dentist for evaluation of her teeth.  She will return to urgent care or emergency department for any worsening or concerning symptoms.    I have reviewed the nursing notes.    I have reviewed the findings, diagnosis, plan and need for follow up with the patient.  This document was prepared using a combination of typing and voice generated software.  While every attempt was made for accuracy, spelling and grammatical errors may exist.         New Prescriptions    AZITHROMYCIN (ZITHROMAX) 250 MG TABLET    Take 2 tablets (500 mg) by mouth daily for 1 day, THEN 1 tablet (250 mg) daily for 4 days.    PREDNISONE (DELTASONE) 20 MG TABLET    Take 2 tablets (40 mg) by mouth daily for 5 days.       Final diagnoses:   Pain, dental       12/20/2024   [facility]       Mpofu, Prudence, CNP  12/20/24 1520       Mpofu, Prudence, CNP  12/20/24 0488

## 2024-12-20 NOTE — DISCHARGE INSTRUCTIONS
Take the antibiotic as prescribed.     Schedule an appointment with a dentist for evaluation.     Return to urgent care or emergency department for worsening or concerning symptoms.

## 2024-12-20 NOTE — ED TRIAGE NOTES
Pt presents with bottom left gum swelling x2 days. Pt stated she does not have a dentist. Pt has been taking tylenol.

## 2025-01-03 ENCOUNTER — HOSPITAL ENCOUNTER (EMERGENCY)
Facility: HOSPITAL | Age: 30
Discharge: HOME | End: 2025-01-03
Payer: COMMERCIAL

## 2025-01-03 VITALS
SYSTOLIC BLOOD PRESSURE: 116 MMHG | OXYGEN SATURATION: 99 % | HEART RATE: 89 BPM | TEMPERATURE: 97.7 F | RESPIRATION RATE: 16 BRPM | DIASTOLIC BLOOD PRESSURE: 86 MMHG

## 2025-01-03 DIAGNOSIS — M79.622 PAIN OF LEFT UPPER ARM: ICD-10-CM

## 2025-01-03 PROCEDURE — 99213 OFFICE O/P EST LOW 20 MIN: CPT

## 2025-01-03 PROCEDURE — 93010 ELECTROCARDIOGRAM REPORT: CPT | Performed by: INTERNAL MEDICINE

## 2025-01-03 PROCEDURE — 93005 ELECTROCARDIOGRAM TRACING: CPT

## 2025-01-03 PROCEDURE — G0463 HOSPITAL OUTPT CLINIC VISIT: HCPCS

## 2025-01-03 ASSESSMENT — ENCOUNTER SYMPTOMS
FEVER: 0
COUGH: 0
ARTHRALGIAS: 1
VOMITING: 0
DIZZINESS: 0
ACTIVITY CHANGE: 1
SHORTNESS OF BREATH: 0
NAUSEA: 0
DIARRHEA: 0

## 2025-01-03 ASSESSMENT — ACTIVITIES OF DAILY LIVING (ADL): ADLS_ACUITY_SCORE: 42

## 2025-01-04 LAB
ATRIAL RATE - MUSE: 79 BPM
DIASTOLIC BLOOD PRESSURE - MUSE: NORMAL MMHG
INTERPRETATION ECG - MUSE: NORMAL
P AXIS - MUSE: 78 DEGREES
PR INTERVAL - MUSE: 120 MS
QRS DURATION - MUSE: 80 MS
QT - MUSE: 386 MS
QTC - MUSE: 442 MS
R AXIS - MUSE: 83 DEGREES
SYSTOLIC BLOOD PRESSURE - MUSE: NORMAL MMHG
T AXIS - MUSE: 41 DEGREES
VENTRICULAR RATE- MUSE: 79 BPM

## 2025-01-04 NOTE — DISCHARGE INSTRUCTIONS
Tylenol and ibuprofen as directed if needed.   Ice for 15-20 minutes every 2-3 hours.   Return with any new or concerning symptoms.   Follow up in the clinic for a recheck.

## 2025-01-04 NOTE — ED TRIAGE NOTES
Pt presents with concerns of left shoulder pain. Pt reports symptom onset was a few days ago. Reports concern for muscle strain

## 2025-01-04 NOTE — ED PROVIDER NOTES
History     Chief Complaint   Patient presents with    Shoulder Pain     HPI  Gerald Evans is a 29 year old female who presents to the urgent care with complaints of a 4 day history of left upper arm pain that radiates into shoulder. Also notes that she has sharp left sided chest pain intermittently, denies any currently. She feels the pain is related to anxiety. She also feels that using her car without power steering is what caused the pain. She denies cough, dizziness, chest pressure, shortness of breath, and n/v/d. No OTC medications. Has been soaking in the tub, which she feels has been helping. She declines anything for pain while in the  tonight.     Allergies:  Allergies   Allergen Reactions    Amphetamine Aspartate Other (See Comments)     Aggressive  Adderall      Amphetamine Sulfate Other (See Comments)     Aggressive  Adderall    Amphetamine-Dextroamphet Er     Amphetamine-Dextroamphetamine      Other reaction(s): Other - Describe In Comment Field  Gets violent    Clindamycin Nausea    Dextroamphetamine Other (See Comments)     Aggressive  Adderall    Diphenhydramine Other (See Comments)     nightmares    Doxycycline Nausea    Strawberry Extract     Sumatriptan Other (See Comments)     Felt really warm with burning ears    Buspar [Buspirone] Rash    Metrogel [Metronidazole] Rash       Problem List:    Patient Active Problem List    Diagnosis Date Noted    Menorrhagia 03/16/2022     Priority: Medium    Cervical dysplasia 03/16/2022     Priority: Medium    HSIL (high grade squamous intraepithelial lesion) on Pap smear of cervix 08/11/2021     Priority: Medium     Added automatically from request for surgery [id number]      [place]'s duct cyst 07/26/2018     Priority: Medium     Monitor during pregnacy      Papanicolaou smear of cervix with low grade squamous intraepithelial lesion (LGSIL) 05/07/2018     Priority: Medium     Repeat at annual (24)      Dyspareunia in female 04/30/2018     Priority: Medium     History of ectopic pregnancy 2018     Priority: Medium    History of  2018     Priority: Medium    History of alcoholism (H) 2018     Priority: Medium    Dysmenorrhea 2018     Priority: Medium    Vaginal bleeding 2016     Priority: Medium     Refusing rhogam despite counseling      Anxiety and depression 2015     Priority: Medium     tools      Loose stools 2015     Priority: Medium    Hidradenitis suppurativa 2013     Priority: Medium    Smoker 2013     Priority: Medium     Trying to quit      Panic disorder without agoraphobia 2013     Priority: Medium     Overview:   [facility]      Migraine with aura 2010     Priority: Medium     Overview:   IMO Update      Oppositional defiant disorder 2008     Priority: Medium     Overview:   IMO Update 10/11      Constipation 09/10/2007     Priority: Medium     Overview:   IMO Update      Attention deficit hyperactivity disorder (ADHD) 2003     Priority: Medium     Overview:   IMO Update 10 2016          Past Medical History:    No past medical history on file.    Past Surgical History:    Past Surgical History:   Procedure Laterality Date    BIOPSY OF SKIN LESION  2013     SECTION  2016    [facility].    COLPOSCOPY CERVIX, LOOP ELECTRODE BIOPSY, COMBINED N/A 2021    Procedure: LOOP ELECTROSURGICAL EXCISION PROCEDURE;  Surgeon: Caleb Kulkarni MD;  [facility]    CYSTOSCOPY N/A 3/16/2022    Procedure: Cystoscopy;  Surgeon: Caleb Kulkarni MD;  [facility]    DILATION AND CURETTAGE N/A 2021    Procedure: DILATION AND CURETTAGE, UTERUS;  Surgeon: Caleb Kulkarni MD;  [facility]    ENT SURGERY      adenoidectomy    ENT SURGERY      anesthesia for tooth work    LAPAROSCOPIC ASSISTED HYSTERECTOMY VAGINAL N/A 3/16/2022    Procedure: LAPAROSCOPIC ASSISTED VAGINAL HYSTERECTOMY;  Surgeon: Caleb Kulkarni MD;  [facility]    LAPAROSCOPIC SALPINGECTOMY Left  12/27/2014    Procedure: LAPAROSCOPIC SALPINGECTOMY;  Surgeon: Leonel Bethea MD;  [facility]    LAPAROSCOPIC SALPINGECTOMY Right 3/16/2022    Procedure: RIGHT LAPAROSCOPIC SALPINGECTOMY;  Surgeon: Caleb Kulkarni MD;  [facility]       Family History:    Family History   Problem Relation Age of Onset    Heart Disease Mother         stent placement    C.A.D. Mother     Diabetes Paternal Grandmother     Hypertension Paternal Grandmother        Social History:  Marital Status:   [4]  Social History     Tobacco Use    Smoking status: Every Day     Current packs/day: 1.00     Average packs/day: 1 pack/day for 7.0 years (7.0 ttl pk-yrs)     Types: Cigarettes    Smokeless tobacco: Never   Substance Use Topics    Alcohol use: Yes     Comment: occas    Drug use: No        Medications:    docusate sodium (COLACE) 100 MG tablet  hydrOXYzine (ATARAX) 25 MG tablet  ibuprofen (ADVIL/MOTRIN) 800 MG tablet  lamoTRIgine (LAMICTAL) 200 MG tablet  LORazepam (ATIVAN) 0.5 MG tablet  methylphenidate (RITALIN) 10 MG tablet  ondansetron (ZOFRAN) 4 MG tablet  prazosin (MINIPRESS) 2 MG capsule          Review of Systems   Constitutional:  Positive for activity change. Negative for fever.   Respiratory:  Negative for cough and shortness of breath.    Cardiovascular:  Positive for chest pain.   Gastrointestinal:  Negative for diarrhea, nausea and vomiting.   Musculoskeletal:  Positive for arthralgias.   Neurological:  Negative for dizziness.   All other systems reviewed and are negative.      Physical Exam   BP: 116/86  Pulse: 89  Temp: 97.7  F (36.5  C)  Resp: 16  SpO2: 99 %      Physical Exam  Vitals and nursing note reviewed.   Constitutional:       General: She is not in acute distress.     Appearance: Normal appearance. She is not ill-appearing or toxic-appearing.   Cardiovascular:      Rate and Rhythm: Normal rate and regular rhythm.      Heart sounds: Normal heart sounds. No murmur heard.  Pulmonary:      Effort:  Pulmonary effort is normal.      Breath sounds: Normal breath sounds. No wheezing, rhonchi or rales.   Musculoskeletal:         General: Tenderness present. No swelling.      Left shoulder: Tenderness present. No swelling, deformity or bony tenderness. Normal range of motion.      Left upper arm: Tenderness present. No swelling or bony tenderness.        Arms:    Skin:     General: Skin is warm and dry.      Coloration: Skin is not pale.      Findings: No erythema.   Neurological:      Mental Status: She is alert.         ED Course     ED Course as of 01/03/25 2106 Fri Jan 03, 2025 2056 EKG NSR with no ectopy or ST elevation     Procedures    Results for orders placed or performed during the hospital encounter of 01/03/25 (from the past 24 hours)   EKG 12 lead   Result Value Ref Range    Systolic Blood Pressure  mmHg    Diastolic Blood Pressure  mmHg    Ventricular Rate 79 BPM    Atrial Rate 79 BPM    NH Interval 120 ms    QRS Duration 80 ms     ms    QTc 442 ms    P Axis 78 degrees    R AXIS 83 degrees    T Axis 41 degrees    Interpretation ECG       Sinus rhythm  Normal ECG  No previous ECGs available         Medications - No data to display    Assessments & Plan (with Medical Decision Making)     I have reviewed the nursing notes.    I have reviewed the findings, diagnosis, plan and need for follow up with the patient.  Gerald Evans is a 29 year old female who presents to the urgent care with complaints of a 4 day history of left upper arm pain that radiates into shoulder. Also notes that she has sharp left sided chest pain intermittently, denies any currently. She feels the pain is related to anxiety. She also feels that using her car without power steering is what caused the pain. She denies cough, dizziness, chest pressure, shortness of breath, and n/v/d. No OTC medications. Has been soaking in the tub, which she feels has been helping. She declines anything for pain while in the [facility].      MDM: vital signs normal, afebrile. Non toxic in appearance with no noted distress. EKG NSR with no ectopy or ST elevation. Lungs clear, heart tones regular. Skin pink, warm, and dry. There is reproducible pain to left posterior shoulder and arm. Discussed XR which she declines at this time. Discussed transferring to ED for cardiac workup, which she also declines. She feels that pain is related to anxiety and is present due to using her car without power steering. With the reproducible pain, less likely ACS. She is also PERC negative, does not take oral hormones, less likely PE. She declines anything for pain in the UC. She is stable at time of discharge. Strict return precautions discussed along with supportive measures. She is in agreement with plan.      (M79.292) Pain of left upper arm  Plan: Tylenol and ibuprofen as directed if needed.   Ice for 15-20 minutes every 2-3 hours.   Return with any new or concerning symptoms.   Follow up in the clinic for a recheck. Understanding verbalized.     Discharge Medication List as of 1/3/2025  9:00 PM          Final diagnoses:   Pain of left upper arm       1/3/2025   [facility]       Chuyita Flores NP  01/03/25 3515

## 2025-02-14 ENCOUNTER — HOSPITAL ENCOUNTER (EMERGENCY)
Facility: HOSPITAL | Age: 30
Discharge: HOME | End: 2025-02-14
Attending: PHYSICIAN ASSISTANT
Payer: COMMERCIAL

## 2025-02-14 VITALS
WEIGHT: 110 LBS | SYSTOLIC BLOOD PRESSURE: 106 MMHG | HEART RATE: 79 BPM | OXYGEN SATURATION: 100 % | HEIGHT: 62 IN | TEMPERATURE: 97.9 F | DIASTOLIC BLOOD PRESSURE: 73 MMHG | BODY MASS INDEX: 20.24 KG/M2 | RESPIRATION RATE: 16 BRPM

## 2025-02-14 DIAGNOSIS — K08.89 PAIN, DENTAL: ICD-10-CM

## 2025-02-14 PROCEDURE — G0463 HOSPITAL OUTPT CLINIC VISIT: HCPCS

## 2025-02-14 PROCEDURE — 99213 OFFICE O/P EST LOW 20 MIN: CPT | Performed by: PHYSICIAN ASSISTANT

## 2025-02-14 RX ORDER — AZITHROMYCIN 250 MG/1
TABLET, FILM COATED ORAL
Qty: 6 TABLET | Refills: 0 | Status: SHIPPED | OUTPATIENT
Start: 2025-02-14 | End: 2025-02-19

## 2025-02-14 ASSESSMENT — ACTIVITIES OF DAILY LIVING (ADL): ADLS_ACUITY_SCORE: 42

## 2025-02-14 NOTE — ED TRIAGE NOTES
Pt presents with right ear pain x1 day. Pt also has lower left jaw swelling due to broken teeth. PT denied fevers, cough, n/v, and diarrhea. Pt has been taking tylenol.          Treatment Time / Fractionation (Optional- Include Units): 1.08 min

## 2025-02-15 NOTE — ED PROVIDER NOTES
History     Chief Complaint   Patient presents with    Otalgia     HPI  Gerald Evans is a 29 year old female who presents for right ear pain and left lower dental pain.  She has a history of recurrent infections.  She denies any fevers or chills she has no nausea vomiting diarrhea no foul taste in mouth no trismus.    Allergies:  Allergies   Allergen Reactions    Amphetamine Aspartate Other (See Comments)     Aggressive  Adderall      Amphetamine Sulfate Other (See Comments)     Aggressive  Adderall    Amphetamine-Dextroamphet Er     Amphetamine-Dextroamphetamine      Other reaction(s): Other - Describe In Comment Field  Gets violent    Clindamycin Nausea    Dextroamphetamine Other (See Comments)     Aggressive  Adderall    Diphenhydramine Other (See Comments)     nightmares    Doxycycline Nausea    Strawberry Extract     Sumatriptan Other (See Comments)     Felt really warm with burning ears    Buspar [Buspirone] Rash    Metrogel [Metronidazole] Rash       Problem List:    Patient Active Problem List    Diagnosis Date Noted    Menorrhagia 2022     Priority: Medium    Cervical dysplasia 2022     Priority: Medium    HSIL (high grade squamous intraepithelial lesion) on Pap smear of cervix 2021     Priority: Medium     Added automatically from request for surgery [id number]      Hammond's duct cyst 2018     Priority: Medium     Monitor during pregnacy      Papanicolaou smear of cervix with low grade squamous intraepithelial lesion (LGSIL) 2018     Priority: Medium     Repeat at annual (24)      Dyspareunia in female 2018     Priority: Medium    History of ectopic pregnancy 2018     Priority: Medium    History of  2018     Priority: Medium    History of alcoholism (H) 2018     Priority: Medium    Dysmenorrhea 2018     Priority: Medium    Vaginal bleeding 2016     Priority: Medium     Refusing rhogam despite counseling      Anxiety and depression  2015     Priority: Medium     tools      Loose stools 2015     Priority: Medium    Hidradenitis suppurativa 2013     Priority: Medium    Smoker 2013     Priority: Medium     Trying to quit      Panic disorder without agoraphobia 2013     Priority: Medium     Overview:   [facility]      Migraine with aura 2010     Priority: Medium     Overview:   IMO Update      Oppositional defiant disorder 2008     Priority: Medium     Overview:   IMO Update 10/11      Constipation 09/10/2007     Priority: Medium     Overview:   IMO Update      Attention deficit hyperactivity disorder (ADHD) 2003     Priority: Medium     Overview:   IMO Update 10 2016          Past Medical History:    No past medical history on file.    Past Surgical History:    Past Surgical History:   Procedure Laterality Date    BIOPSY OF SKIN LESION       SECTION  2016    [facility].    COLPOSCOPY CERVIX, LOOP ELECTRODE BIOPSY, COMBINED N/A 2021    Procedure: LOOP ELECTROSURGICAL EXCISION PROCEDURE;  Surgeon: Caleb Kulkarni MD;  [facility]    CYSTOSCOPY N/A 3/16/2022    Procedure: Cystoscopy;  Surgeon: Caleb Kulkarni MD;  [facility]    DILATION AND CURETTAGE N/A 2021    Procedure: DILATION AND CURETTAGE, UTERUS;  Surgeon: Caleb Kulkarni MD;  [facility]    ENT SURGERY      adenoidectomy    ENT SURGERY      anesthesia for tooth work    LAPAROSCOPIC ASSISTED HYSTERECTOMY VAGINAL N/A 3/16/2022    Procedure: LAPAROSCOPIC ASSISTED VAGINAL HYSTERECTOMY;  Surgeon: Caleb Kulkarni MD;  [facility]    LAPAROSCOPIC SALPINGECTOMY Left 2014    Procedure: LAPAROSCOPIC SALPINGECTOMY;  Surgeon: Leonel Bethea MD;  [facility]    LAPAROSCOPIC SALPINGECTOMY Right 3/16/2022    Procedure: RIGHT LAPAROSCOPIC SALPINGECTOMY;  Surgeon: Caleb Kulkarni MD;  [facility]       Family History:    Family History   Problem Relation Age of Onset    Heart Disease Mother         stent  "placement    C.A.D. Mother     Diabetes Paternal Grandmother     Hypertension Paternal Grandmother        Social History:  Marital Status:   [4]  Social History     Tobacco Use    Smoking status: Every Day     Current packs/day: 1.00     Average packs/day: 1 pack/day for 7.0 years (7.0 ttl pk-yrs)     Types: Cigarettes    Smokeless tobacco: Never   Substance Use Topics    Alcohol use: Yes     Comment: occas    Drug use: No        Medications:    azithromycin (ZITHROMAX) 250 MG tablet  docusate sodium (COLACE) 100 MG tablet  hydrOXYzine (ATARAX) 25 MG tablet  ibuprofen (ADVIL/MOTRIN) 800 MG tablet  lamoTRIgine (LAMICTAL) 200 MG tablet  LORazepam (ATIVAN) 0.5 MG tablet  methylphenidate (RITALIN) 10 MG tablet  ondansetron (ZOFRAN) 4 MG tablet  prazosin (MINIPRESS) 2 MG capsule          Review of Systems   All other systems reviewed and are negative.      Physical Exam   BP: 106/73  Pulse: 79  Temp: 97.9  F (36.6  C)  Resp: 16  Height: 157.5 cm (5' 2\")  Weight: 49.9 kg (110 lb)  SpO2: 100 %      Physical Exam  Constitutional:       General: She is not in acute distress.     Appearance: Normal appearance. She is normal weight. She is not ill-appearing, toxic-appearing or diaphoretic.   HENT:      Head: Normocephalic and atraumatic.      Right Ear: Hearing, tympanic membrane, ear canal and external ear normal.      Left Ear: Hearing, tympanic membrane, ear canal and external ear normal.      Mouth/Throat:      Dentition: Abnormal dentition. Dental caries present.      Pharynx: Oropharynx is clear.   Eyes:      Extraocular Movements: Extraocular movements intact.      Conjunctiva/sclera: Conjunctivae normal.      Pupils: Pupils are equal, round, and reactive to light.   Cardiovascular:      Rate and Rhythm: Normal rate.   Pulmonary:      Effort: Pulmonary effort is normal. No respiratory distress.   Musculoskeletal:         General: Normal range of motion.   Skin:     General: Skin is warm and dry.      Coloration: " Skin is not jaundiced or pale.   Neurological:      Mental Status: She is alert and oriented to person, place, and time. Mental status is at baseline.      Cranial Nerves: No cranial nerve deficit.   Psychiatric:         Mood and Affect: Mood normal.         ED Course      Patient has multiple allergies tells me that have passed that she is using this azithromycin with relief.  Negative for that waterproof sheets  Procedures                  No results found for this or any previous visit (from the past 24 hours).    Medications - No data to display    Assessments & Plan (with Medical Decision Making)     I have reviewed the nursing notes.    I have reviewed the findings, diagnosis, plan and need for follow up with the patient.        New Prescriptions    AZITHROMYCIN (ZITHROMAX) 250 MG TABLET    Take 2 tablets (500 mg) by mouth daily for 1 day, THEN 1 tablet (250 mg) daily for 4 days.       Final diagnoses:   Pain, dental       2/14/2025   [facility]       Dwayne Cid PA-C  02/14/25 [id number]

## 2025-03-03 ENCOUNTER — HOSPITAL ENCOUNTER (EMERGENCY)
Facility: HOSPITAL | Age: 30
Discharge: HOME | End: 2025-03-03
Attending: NURSE PRACTITIONER
Payer: COMMERCIAL

## 2025-03-03 VITALS
HEART RATE: 88 BPM | SYSTOLIC BLOOD PRESSURE: 112 MMHG | OXYGEN SATURATION: 98 % | TEMPERATURE: 97.6 F | RESPIRATION RATE: 16 BRPM | DIASTOLIC BLOOD PRESSURE: 77 MMHG

## 2025-03-03 DIAGNOSIS — S02.5XXA BROKEN TOOTH: ICD-10-CM

## 2025-03-03 DIAGNOSIS — K08.89 PAIN, DENTAL: Primary | ICD-10-CM

## 2025-03-03 PROCEDURE — 99213 OFFICE O/P EST LOW 20 MIN: CPT | Performed by: NURSE PRACTITIONER

## 2025-03-03 PROCEDURE — G0463 HOSPITAL OUTPT CLINIC VISIT: HCPCS

## 2025-03-03 RX ORDER — AZITHROMYCIN 250 MG/1
TABLET, FILM COATED ORAL
Qty: 6 TABLET | Refills: 0 | Status: SHIPPED | OUTPATIENT
Start: 2025-03-03

## 2025-03-03 ASSESSMENT — ACTIVITIES OF DAILY LIVING (ADL): ADLS_ACUITY_SCORE: 42

## 2025-03-03 ASSESSMENT — ENCOUNTER SYMPTOMS
FEVER: 0
HEADACHES: 1
SINUS PAIN: 1

## 2025-03-03 ASSESSMENT — COLUMBIA-SUICIDE SEVERITY RATING SCALE - C-SSRS
1. IN THE PAST MONTH, HAVE YOU WISHED YOU WERE DEAD OR WISHED YOU COULD GO TO SLEEP AND NOT WAKE UP?: NO
6. HAVE YOU EVER DONE ANYTHING, STARTED TO DO ANYTHING, OR PREPARED TO DO ANYTHING TO END YOUR LIFE?: NO
2. HAVE YOU ACTUALLY HAD ANY THOUGHTS OF KILLING YOURSELF IN THE PAST MONTH?: NO

## 2025-03-04 NOTE — ED PROVIDER NOTES
History     Chief Complaint   Patient presents with    Dental Pain     HPI  Gerald Evans is a 29 year old female who presents to urgent care for left dental pain that started today. Accompanied with sinus pain and headaches. No known fevers. No abnormal taste in mouth. Dentist appointment on 3/13/25. Taking tylenol.     Allergies:  Allergies   Allergen Reactions    Amphetamine Aspartate Other (See Comments)     Aggressive  Adderall      Amphetamine Sulfate Other (See Comments)     Aggressive  Adderall    Amphetamine-Dextroamphet Er     Amphetamine-Dextroamphetamine      Other reaction(s): Other - Describe In Comment Field  Gets violent    Clindamycin Nausea    Dextroamphetamine Other (See Comments)     Aggressive  Adderall    Diphenhydramine Other (See Comments)     nightmares    Doxycycline Nausea    Strawberry Extract     Sumatriptan Other (See Comments)     Felt really warm with burning ears    Buspar [Buspirone] Rash    Metrogel [Metronidazole] Rash       Problem List:    Patient Active Problem List    Diagnosis Date Noted    Menorrhagia 2022     Priority: Medium    Cervical dysplasia 2022     Priority: Medium    HSIL (high grade squamous intraepithelial lesion) on Pap smear of cervix 2021     Priority: Medium     Added automatically from request for surgery [id number]      [place]'s duct cyst 2018     Priority: Medium     Monitor during pregnacy      Papanicolaou smear of cervix with low grade squamous intraepithelial lesion (LGSIL) 2018     Priority: Medium     Repeat at annual (24)      Dyspareunia in female 2018     Priority: Medium    History of ectopic pregnancy 2018     Priority: Medium    History of  2018     Priority: Medium    History of alcoholism (H) 2018     Priority: Medium    Dysmenorrhea 2018     Priority: Medium    Vaginal bleeding 2016     Priority: Medium     Refusing rhogam despite counseling      Anxiety and  depression 2015     Priority: Medium     tools      Loose stools 2015     Priority: Medium    Hidradenitis suppurativa 2013     Priority: Medium    Smoker 2013     Priority: Medium     Trying to quit      Panic disorder without agoraphobia 2013     Priority: Medium     Overview:   [facility]      Migraine with aura 2010     Priority: Medium     Overview:   IMO Update      Oppositional defiant disorder 2008     Priority: Medium     Overview:   IMO Update 10/11      Constipation 09/10/2007     Priority: Medium     Overview:   IMO Update      Attention deficit hyperactivity disorder (ADHD) 2003     Priority: Medium     Overview:   IMO Update 10 2016          Past Medical History:    No past medical history on file.    Past Surgical History:    Past Surgical History:   Procedure Laterality Date    BIOPSY OF SKIN LESION  2013     SECTION  2016    [facility].    COLPOSCOPY CERVIX, LOOP ELECTRODE BIOPSY, COMBINED N/A 2021    Procedure: LOOP ELECTROSURGICAL EXCISION PROCEDURE;  Surgeon: Caleb Kulkarni MD;  [facility]    CYSTOSCOPY N/A 3/16/2022    Procedure: Cystoscopy;  Surgeon: Caleb Kulkarni MD;  [facility]    DILATION AND CURETTAGE N/A 2021    Procedure: DILATION AND CURETTAGE, UTERUS;  Surgeon: Caleb Kulkarni MD;  [facility]    ENT SURGERY      adenoidectomy    ENT SURGERY      anesthesia for tooth work    LAPAROSCOPIC ASSISTED HYSTERECTOMY VAGINAL N/A 3/16/2022    Procedure: LAPAROSCOPIC ASSISTED VAGINAL HYSTERECTOMY;  Surgeon: Caleb Kulkarni MD;  [facility]    LAPAROSCOPIC SALPINGECTOMY Left 2014    Procedure: LAPAROSCOPIC SALPINGECTOMY;  Surgeon: Leonel Bethea MD;  [facility]    LAPAROSCOPIC SALPINGECTOMY Right 3/16/2022    Procedure: RIGHT LAPAROSCOPIC SALPINGECTOMY;  Surgeon: Caleb Kulkarni MD;  [facility]       Family History:    Family History   Problem Relation Age of Onset    Heart Disease Mother          stent placement    C.A.D. Mother     Diabetes Paternal Grandmother     Hypertension Paternal Grandmother        Social History:  Marital Status:   [4]  Social History     Tobacco Use    Smoking status: Every Day     Current packs/day: 1.00     Average packs/day: 1 pack/day for 7.0 years (7.0 ttl pk-yrs)     Types: Cigarettes    Smokeless tobacco: Never   Substance Use Topics    Alcohol use: Yes     Comment: occas    Drug use: No        Medications:    azithromycin (ZITHROMAX) 250 MG tablet  azithromycin (ZITHROMAX) 250 MG tablet  docusate sodium (COLACE) 100 MG tablet  hydrOXYzine (ATARAX) 25 MG tablet  ibuprofen (ADVIL/MOTRIN) 800 MG tablet  lamoTRIgine (LAMICTAL) 200 MG tablet  LORazepam (ATIVAN) 0.5 MG tablet  methylphenidate (RITALIN) 10 MG tablet  ondansetron (ZOFRAN) 4 MG tablet  prazosin (MINIPRESS) 2 MG capsule          Review of Systems   Constitutional:  Negative for fever.   HENT:  Positive for dental problem and sinus pain.    Neurological:  Positive for headaches.   All other systems reviewed and are negative.      Physical Exam   BP: 112/77  Pulse: 88  Temp: 97.6  F (36.4  C)  Resp: 16  SpO2: 98 %      Physical Exam  Vitals and nursing note reviewed.   Constitutional:       General: She is not in acute distress.     Appearance: Normal appearance. She is well-developed.   HENT:      Head: Normocephalic and atraumatic.      Mouth/Throat:     Eyes:      Conjunctiva/sclera: Conjunctivae normal.   Cardiovascular:      Rate and Rhythm: Normal rate.   Pulmonary:      Effort: Pulmonary effort is normal. No respiratory distress.   Abdominal:      General: Abdomen is flat.   Musculoskeletal:      Cervical back: Normal range of motion and neck supple.   Skin:     Coloration: Skin is not pale.   Neurological:      Mental Status: She is alert and oriented to person, place, and time.         ED Course        Procedures         No results found for this or any previous visit (from the past 24  hours).    Medications - No data to display    Assessments & Plan (with Medical Decision Making)   This is a 29-year-old female that has been dealing with intermittent upper dental pain from a broken tooth.  Has been seen in this department for treatment of these.  Is awaiting her dentist appointment on 3/13/2025.  Current pain started today.  Concern for an abscess as she can feel pain going up near her nose and into her sinuses.    Has a history of a rash with possible Augmentin.  She does endorse that they could have been other things that could have caused the rash at the time that she was taking Augmentin but unsure at this time.  Azithromycin is working well to treat the infections in the past.    A longer course of azithromycin as prescribed today.  Encouraged her to keep scheduled appointment with dentist in 10 days.  Strict return precautions to UC/ER discussed with patient verbalizing understanding.    I have reviewed the nursing notes.    I have reviewed the findings, diagnosis, plan and need for follow up with the patient.  This document was prepared using a combination of typing and voice generated software.  While every attempt was made for accuracy, spelling and grammatical errors may exist.         New Prescriptions    AZITHROMYCIN (ZITHROMAX) 250 MG TABLET    Take 2 tablets (500 mg) on day 1.  Take 1 tablet (250 mg) on days 2-9.    AZITHROMYCIN (ZITHROMAX) 250 MG TABLET    Take 2 tablets (500 mg) on day 1.  Take 1 tablet (250 mg) on days 2-10       Final diagnoses:   Pain, dental   Broken tooth       3/3/2025   [facility]       Mpofu, Perlaudence, CNP  03/04/25 [id number]

## 2025-03-04 NOTE — ED TRIAGE NOTES
Pt reports facial swelling on the upper left jaw starting today, pt states that she has a broken tooth that hurts and believes its abscessed.

## 2025-03-04 NOTE — DISCHARGE INSTRUCTIONS
Take antibiotic as prescribed.    Keep scheduled appointment with dentist on 3/13/2025.    Return to urgent care or emergency department for any worsening or concerning symptoms.

## 2025-06-19 ENCOUNTER — APPOINTMENT (OUTPATIENT)
Dept: GENERAL RADIOLOGY | Facility: HOSPITAL | Age: 30
End: 2025-06-19
Attending: EMERGENCY MEDICINE

## 2025-06-19 ENCOUNTER — HOSPITAL ENCOUNTER (EMERGENCY)
Facility: HOSPITAL | Age: 30
Discharge: HOME | End: 2025-06-19
Attending: NURSE PRACTITIONER | Admitting: NURSE PRACTITIONER

## 2025-06-19 VITALS
HEART RATE: 88 BPM | TEMPERATURE: 98.5 F | WEIGHT: 107 LBS | DIASTOLIC BLOOD PRESSURE: 80 MMHG | OXYGEN SATURATION: 100 % | RESPIRATION RATE: 20 BRPM | SYSTOLIC BLOOD PRESSURE: 117 MMHG | BODY MASS INDEX: 19.57 KG/M2

## 2025-06-19 DIAGNOSIS — M25.512 ACUTE PAIN OF LEFT SHOULDER: ICD-10-CM

## 2025-06-19 PROCEDURE — 73030 X-RAY EXAM OF SHOULDER: CPT | Mod: LT

## 2025-06-19 PROCEDURE — 99283 EMERGENCY DEPT VISIT LOW MDM: CPT

## 2025-06-19 PROCEDURE — 73030 X-RAY EXAM OF SHOULDER: CPT | Mod: 26 | Performed by: RADIOLOGY

## 2025-06-19 PROCEDURE — 99283 EMERGENCY DEPT VISIT LOW MDM: CPT | Performed by: NURSE PRACTITIONER

## 2025-06-19 ASSESSMENT — ENCOUNTER SYMPTOMS
NEUROLOGICAL NEGATIVE: 1
PSYCHIATRIC NEGATIVE: 1
CARDIOVASCULAR NEGATIVE: 1
HEMATOLOGIC/LYMPHATIC NEGATIVE: 1
GASTROINTESTINAL NEGATIVE: 1
EYES NEGATIVE: 1
ENDOCRINE NEGATIVE: 1
RESPIRATORY NEGATIVE: 1
CONSTITUTIONAL NEGATIVE: 1
ALLERGIC/IMMUNOLOGIC NEGATIVE: 1

## 2025-06-19 ASSESSMENT — ACTIVITIES OF DAILY LIVING (ADL): ADLS_ACUITY_SCORE: 42

## 2025-06-20 NOTE — ED PROVIDER NOTES
History     Chief Complaint   Patient presents with    Shoulder Pain     HPI  Gerald Evans is a 30 year old individual with history of anxiety, depression, ADHD, oppositional defiant disorder, panic disorder, comes in for left shoulder pain.  Patient states that she has been cleaning her house and everything and now she is having left shoulder pain.  Comes in for evaluation.  No paresthesias.  No loss of range of motion.     Allergies:  Allergies   Allergen Reactions    Amphetamine Aspartate Other (See Comments)     Aggressive  Adderall      Amphetamine Sulfate Other (See Comments)     Aggressive  Adderall    Amphetamine-Dextroamphet Er     Amphetamine-Dextroamphetamine      Other reaction(s): Other - Describe In Comment Field  Gets violent    Clindamycin Nausea    Dextroamphetamine Other (See Comments)     Aggressive  Adderall    Diphenhydramine Other (See Comments)     nightmares    Doxycycline Nausea    Strawberry Extract     Sumatriptan Other (See Comments)     Felt really warm with burning ears    Buspar [Buspirone] Rash    Metrogel [Metronidazole] Rash       Problem List:    Patient Active Problem List    Diagnosis Date Noted    Menorrhagia 2022     Priority: Medium    Cervical dysplasia 2022     Priority: Medium    HSIL (high grade squamous intraepithelial lesion) on Pap smear of cervix 2021     Priority: Medium     Added automatically from request for surgery [id number]      Al[place]'s duct cyst 2018     Priority: Medium     Monitor during pregnacy      Papanicolaou smear of cervix with low grade squamous intraepithelial lesion (LGSIL) 2018     Priority: Medium     Repeat at annual (24)      Dyspareunia in female 2018     Priority: Medium    History of ectopic pregnancy 2018     Priority: Medium    History of  2018     Priority: Medium    History of alcoholism (H) 2018     Priority: Medium    Dysmenorrhea 2018     Priority: Medium     Vaginal bleeding 2016     Priority: Medium     Refusing rhogam despite counseling      Anxiety and depression 2015     Priority: Medium     tools      Loose stools 2015     Priority: Medium    Hidradenitis suppurativa 2013     Priority: Medium    Smoker 2013     Priority: Medium     Trying to quit      Panic disorder without agoraphobia 2013     Priority: Medium     Overview:   [facility]      Migraine with aura 2010     Priority: Medium     Overview:   IMO Update      Oppositional defiant disorder 2008     Priority: Medium     Overview:   IMO Update 10/11      Constipation 09/10/2007     Priority: Medium     Overview:   IMO Update      Attention deficit hyperactivity disorder (ADHD) 2003     Priority: Medium     Overview:   IMO Update 10 2016          Past Medical History:    No past medical history on file.    Past Surgical History:    Past Surgical History:   Procedure Laterality Date    BIOPSY OF SKIN LESION  2013     SECTION  2016    [facility].    COLPOSCOPY CERVIX, LOOP ELECTRODE BIOPSY, COMBINED N/A 2021    Procedure: LOOP ELECTROSURGICAL EXCISION PROCEDURE;  Surgeon: Caleb Kulkarni MD;  [facility]    CYSTOSCOPY N/A 3/16/2022    Procedure: Cystoscopy;  Surgeon: Caleb Kulkarni MD;  [facility]    DILATION AND CURETTAGE N/A 2021    Procedure: DILATION AND CURETTAGE, UTERUS;  Surgeon: Caleb Kulkarni MD;  [facility]    ENT SURGERY      adenoidectomy    ENT SURGERY      anesthesia for tooth work    LAPAROSCOPIC ASSISTED HYSTERECTOMY VAGINAL N/A 3/16/2022    Procedure: LAPAROSCOPIC ASSISTED VAGINAL HYSTERECTOMY;  Surgeon: Caleb Kulkarni MD;  [facility]    LAPAROSCOPIC SALPINGECTOMY Left 2014    Procedure: LAPAROSCOPIC SALPINGECTOMY;  Surgeon: Leonel Bethea MD;  [facility]    LAPAROSCOPIC SALPINGECTOMY Right 3/16/2022    Procedure: RIGHT LAPAROSCOPIC SALPINGECTOMY;  Surgeon: Caleb Kulkarni MD;  [facility]       Family History:    Family History   Problem Relation Age of Onset    Heart Disease Mother         stent placement    C.A.D. Mother     Diabetes Paternal Grandmother     Hypertension Paternal Grandmother        Social History:  Marital Status:   [4]  Social History     Tobacco Use    Smoking status: Every Day     Current packs/day: 1.00     Average packs/day: 1 pack/day for 7.0 years (7.0 ttl pk-yrs)     Types: Cigarettes    Smokeless tobacco: Never   Substance Use Topics    Alcohol use: Yes     Comment: occas    Drug use: No        Medications:    azithromycin (ZITHROMAX) 250 MG tablet  azithromycin (ZITHROMAX) 250 MG tablet  docusate sodium (COLACE) 100 MG tablet  hydrOXYzine (ATARAX) 25 MG tablet  ibuprofen (ADVIL/MOTRIN) 800 MG tablet  lamoTRIgine (LAMICTAL) 200 MG tablet  LORazepam (ATIVAN) 0.5 MG tablet  methylphenidate (RITALIN) 10 MG tablet  ondansetron (ZOFRAN) 4 MG tablet  prazosin (MINIPRESS) 2 MG capsule          Review of Systems   Constitutional: Negative.    HENT: Negative.     Eyes: Negative.    Respiratory: Negative.     Cardiovascular: Negative.    Gastrointestinal: Negative.    Endocrine: Negative.    Genitourinary: Negative.    Musculoskeletal:         Left shoulder pain   Skin: Negative.    Allergic/Immunologic: Negative.    Neurological: Negative.    Hematological: Negative.    Psychiatric/Behavioral: Negative.         Physical Exam   BP: 117/80  Pulse: 88  Temp: 98.5  F (36.9  C)  Resp: 20  Weight: 48.5 kg (107 lb)  SpO2: 100 %      GENERAL APPEARANCE:  The patient is a 30 year old well-developed, well-nourished individual that appears as stated age.  NECK:  Supple.  Trachea is midline.   EXTREMITIES:  No cyanosis, clubbing, or edema.  Radial pulses are 2+ to the left upper extremity.  MUSCULOSKELETAL: Tenderness to palpation over left lateral shoulder and.  Pain with abduction at about 80 degrees going up to 180 degrees.  No internal or external rotation abnormalities noted.   Range of motion intact to left elbow, left wrist, digits of left hand.  No cervical spinal tenderness, step-offs, deformities.  Cervical range of motion intact.  NEUROLOGIC:  No focal sensory or motor deficits are noted.   PSYCHIATRIC:  The patient is awake, alert, and oriented x4.  Recent and remote memory is intact.  Anxious mood and affect.  Cooperative with history and physical exam.  SKIN:  Warm, dry, and well perfused.  Good turgor.  No lesions, nodules, or rashes are noted.  No bruising noted.    ED Course     ED Course as of 06/19/25 2232   Thu Jun 19, 2025 2216 In to see patient and history/physical completed.    2224 Will discharge patient home with physical therapy referral.  PRICE therapy recommended.  Symptom/ibuprofen.  Follow-up recommendations return precautions given.            Results for orders placed or performed during the hospital encounter of 06/19/25 (from the past 24 hours)   XR Shoulder Left G/E 3 Views    Narrative    EXAM: XR SHOULDER LEFT G/E 3 VIEWS  LOCATION: [facility]  DATE: 6/19/2025    INDICATION: pain  COMPARISON: None.      Impression    IMPRESSION: Normal joint spaces and alignment. No fracture.       Medications - No data to display    Assessments & Plan (with Medical Decision Making)     I have reviewed the nursing notes.    I have reviewed the findings, diagnosis, plan and need for follow up with the patient.    Summary:  Patient presents to the ER today for left shoulder pain.  Potential diagnosis which have been considered and evaluated include fracture, dislocation, strain, as well as others. Many of these have been excluded using the various modalities and assessment as noted on the chart. At the present time, the diagnosis is left shoulder pain.  Upon arrival, vitals signs are normal.  The patient is alert and oriented but very anxious.  Focal and reproducible tenderness to palpation over the left shoulder.  No crepitus or deformities.  Pain in left  shoulder with abduction starting at 80 degrees going up to 180 degrees.  Distal CMS intact to the left upper extremity.  X-ray of left shoulder personally reviewed showing no fracture or dislocation.  Discussed acetaminophen/ibuprofen and PRICE therapy with patient.  Patient deferred sling.  Referral for PT placed.  Advised patient to follow-up with PCP and return to ER if noticing symptoms.  Patient verbalizes send use plan of care.  Patient discharged home.        Critical Care Time: None    Impression and plan discussed with patient. Questions answered, concerns addressed, indications for urgent re-evaluation reviewed, and  given. Patient/Parent/Caregiver agree with treatment plan and have no further questions at this time.  AVS deferred at discharge.    This document was prepared using a combination of typing and voice generated software.  While every attempt was made for accuracy, spelling and grammatical errors may exist.              Discharge Medication List as of 6/19/2025 10:27 PM          Final diagnoses:   Acute pain of left shoulder       6/19/2025   [facility]       Jay Higgins APRN CNP  06/19/25 [id number]

## 2025-06-20 NOTE — ED TRIAGE NOTES
"Patient has left shoulder pain.  Reports her pit bull was pulling on the leash yesterday.   \"I might have chest pain but I'm also anxious and my shoulder hurts,\" she stated.   A&Ox4.  Has not taken anything for pain.     Triage Assessment (Adult)       Row Name 06/19/25 2059          Triage Assessment    Airway WDL WDL        Respiratory WDL    Respiratory WDL WDL;rhythm/pattern;expansion/retractions     Rhythm/Pattern, Respiratory unlabored;pattern regular;depth regular;no shortness of breath reported     Expansion/Accessory Muscles/Retractions no use of accessory muscles;no retractions;expansion symmetric        Peripheral/Neurovascular WDL    Peripheral Neurovascular WDL WDL        Cognitive/Neuro/Behavioral WDL    Cognitive/Neuro/Behavioral WDL WDL                     "

## 2025-06-20 NOTE — DISCHARGE INSTRUCTIONS
therapy:     P- Protect your injury by avoiding more trauma.  This can be done with the use of the equipment that was provided (if one was given).  This does not need to be worn at all times.  Take it off and do passive range of motion exercises so it doesn't become overly stiff.   R- Rest you injury.  Do not do excessive work with the injured part.     I- Ice the injured area every 3-4 hours for 20 minutes.  Be sure to place a barrier between the ice and skin so frostbite doesn't occur.   C- Compression can be done with an ACE bandage if desired.  Do not wrap it too tight so circulation is cut off.  If increased swelling occurs, numbness/tingling, or the extremity turns blue, LOOSEN THE ACE WRAP.   E- Elevate the injured area above the level of your heart when you are not up and about.             R- Rehabilitation (some soft tissue injuries, particularly around joints, may require rehab to limit the likelihood of joint stiffness.     As pain recedes, begin normal activities slowly as tolerated.     Call if symptoms persist.         Pain control:   If your past medical conditions, allergies, current medications, or current status does not prevent you from using acetaminophen and/or ibuprofen, use the following:   Acetaminophen 650-1000 mg every 6 hours as needed for pain in addition to ibuprofen 400-600 mg every 6 hours as needed for pain.  Take these two medications together if wanted.    Remember that these are for AS NEEDED.  If not needed, do not take.            Follow-up with your primary care provider for reevaluation.  Contact your primary care provider if you have any questions or concerns.  Do not hesitate to return to the ER if any new or worsening symptoms.     Please read the attached instructions (if any).  They highlight more specific treatments and interventions for you at home.              Thank you for letting me participate in your care and wish you a fast and uneventful recovery,    Jay  Ronaldo GALLAGHER, MISHEL    Do not hesitate to contact me with questions or concerns.  [email or web address]

## 2025-07-06 ENCOUNTER — HEALTH MAINTENANCE LETTER (OUTPATIENT)
Age: 30
End: 2025-07-06

## 2025-07-10 ENCOUNTER — HOSPITAL ENCOUNTER (EMERGENCY)
Facility: HOSPITAL | Age: 30
Discharge: HOME | End: 2025-07-10
Attending: NURSE PRACTITIONER | Admitting: NURSE PRACTITIONER

## 2025-07-10 VITALS
OXYGEN SATURATION: 99 % | SYSTOLIC BLOOD PRESSURE: 98 MMHG | HEART RATE: 78 BPM | RESPIRATION RATE: 16 BRPM | DIASTOLIC BLOOD PRESSURE: 66 MMHG | TEMPERATURE: 97.1 F

## 2025-07-10 DIAGNOSIS — K08.89 PAIN, DENTAL: Primary | ICD-10-CM

## 2025-07-10 PROCEDURE — G0463 HOSPITAL OUTPT CLINIC VISIT: HCPCS | Performed by: NURSE PRACTITIONER

## 2025-07-10 PROCEDURE — 99213 OFFICE O/P EST LOW 20 MIN: CPT | Performed by: NURSE PRACTITIONER

## 2025-07-10 RX ORDER — CHLORHEXIDINE GLUCONATE ORAL RINSE 1.2 MG/ML
15 SOLUTION DENTAL 2 TIMES DAILY
Qty: 210 ML | Refills: 0 | Status: SHIPPED | OUTPATIENT
Start: 2025-07-10 | End: 2025-07-17

## 2025-07-10 RX ORDER — CLINDAMYCIN HYDROCHLORIDE 150 MG/1
450 CAPSULE ORAL 3 TIMES DAILY
Qty: 63 CAPSULE | Refills: 0 | Status: SHIPPED | OUTPATIENT
Start: 2025-07-10 | End: 2025-07-17

## 2025-07-10 ASSESSMENT — ENCOUNTER SYMPTOMS
SHORTNESS OF BREATH: 0
CHILLS: 0
NAUSEA: 0
FEVER: 0
TROUBLE SWALLOWING: 0
DIARRHEA: 0
PSYCHIATRIC NEGATIVE: 1
NECK PAIN: 0
NECK STIFFNESS: 0
VOMITING: 0

## 2025-07-10 ASSESSMENT — ACTIVITIES OF DAILY LIVING (ADL): ADLS_ACUITY_SCORE: 42

## 2025-07-10 NOTE — DISCHARGE INSTRUCTIONS
Clindamycin as ordered  - Take entire course of antibiotic even if you start to feel better.  - Antibiotics can cause stomach upset including nausea and diarrhea. Read your bottle or ask the pharmacist if antibiotic can be taken with food to help prevent nausea. If you have symptoms of diarrhea you can take an over-the-counter probiotic and/or increase foods with probiotics such as yogurt, [place], sauerkraut.    Peridex mouthwash as ordered    Alternate Tylenol and ibuprofen for pain    Follow-up with a dentist for further evaluation    Follow-up with primary care provider or return to urgent care/ED with any worsening in condition or additional concerns

## 2025-07-10 NOTE — ED PROVIDER NOTES
History     Chief Complaint   Patient presents with    Dental Pain     HPI  Gerald Evans is a 30 year old female who presents to urgent care today ambulatory with complaints of dental pain to tooth #11.  Patient has been seen for dental pain to tooth #11 in the past on 3/3/2025, states she was started on azithromycin which did not help and then was switched to clindamycin which is on her allergy list and worked well for her.  Patient has no trismus.  No neck pain or stiffness.  No difficulty swallowing.  Denies any fever, chills, nausea, vomiting, diarrhea, shortness of breath or chest pain.  No other concerns    Allergies:  Allergies   Allergen Reactions    Amphetamine Aspartate Other (See Comments)     Aggressive  Adderall      Amphetamine Sulfate Other (See Comments)     Aggressive  Adderall    Amphetamine-Dextroamphet Er     Amphetamine-Dextroamphetamine      Other reaction(s): Other - Describe In Comment Field  Gets violent    Clindamycin Nausea    Dextroamphetamine Other (See Comments)     Aggressive  Adderall    Diphenhydramine Other (See Comments)     nightmares    Doxycycline Nausea    Strawberry Extract     Sumatriptan Other (See Comments)     Felt really warm with burning ears    Buspar [Buspirone] Rash    Metrogel [Metronidazole] Rash       Problem List:    Patient Active Problem List    Diagnosis Date Noted    Menorrhagia 03/16/2022     Priority: Medium    Cervical dysplasia 03/16/2022     Priority: Medium    HSIL (high grade squamous intraepithelial lesion) on Pap smear of cervix 08/11/2021     Priority: Medium     Added automatically from request for surgery [id number]      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     Priority: Medium     Monitor during pregnacy      Papanicolaou smear of cervix with low grade squamous intraepithelial lesion (LGSIL) 05/07/2018     Priority: Medium     Repeat at annual (24)      Dyspareunia in female 04/30/2018     Priority: Medium    History of ectopic pregnancy 04/30/2018      Priority: Medium    History of  2018     Priority: Medium    History of alcoholism (H) 2018     Priority: Medium    Dysmenorrhea 2018     Priority: Medium    Vaginal bleeding 2016     Priority: Medium     Refusing rhogam despite counseling      Anxiety and depression 2015     Priority: Medium     tools      Loose stools 2015     Priority: Medium    Hidradenitis suppurativa 2013     Priority: Medium    Smoker 2013     Priority: Medium     Trying to quit      Panic disorder without agoraphobia 2013     Priority: Medium     Overview:   [facility]      Migraine with aura 2010     Priority: Medium     Overview:   IMO Update      Oppositional defiant disorder 2008     Priority: Medium     Overview:   IMO Update 10/11      Constipation 09/10/2007     Priority: Medium     Overview:   IMO Update      Attention deficit hyperactivity disorder (ADHD) 2003     Priority: Medium     Overview:   IMO Update 10 2016          Past Medical History:    No past medical history on file.    Past Surgical History:    Past Surgical History:   Procedure Laterality Date    BIOPSY OF SKIN LESION  2013     SECTION  2016    [facility].    COLPOSCOPY CERVIX, LOOP ELECTRODE BIOPSY, COMBINED N/A 2021    Procedure: LOOP ELECTROSURGICAL EXCISION PROCEDURE;  Surgeon: Caleb Kulkarni MD;  Location: [facility]    CYSTOSCOPY N/A 3/16/2022    Procedure: Cystoscopy;  Surgeon: Caleb Kulkarni MD;  Location: [facility]    DILATION AND CURETTAGE N/A 2021    Procedure: DILATION AND CURETTAGE, UTERUS;  Surgeon: Caleb Kulkarni MD;  Location: [facility]    ENT SURGERY      adenoidectomy    ENT SURGERY      anesthesia for tooth work    LAPAROSCOPIC ASSISTED HYSTERECTOMY VAGINAL N/A 3/16/2022    Procedure: LAPAROSCOPIC ASSISTED VAGINAL HYSTERECTOMY;  Surgeon: Caleb Kulkarni MD;  Location: [facility]    LAPAROSCOPIC SALPINGECTOMY Left 2014    Procedure: LAPAROSCOPIC SALPINGECTOMY;   Surgeon: Leonel Bethea MD;  Location: [facility]    LAPAROSCOPIC SALPINGECTOMY Right 3/16/2022    Procedure: RIGHT LAPAROSCOPIC SALPINGECTOMY;  Surgeon: Caleb Kulkarni MD;  Location: [facility]       Family History:    Family History   Problem Relation Age of Onset    Heart Disease Mother         stent placement    C.A.D. Mother     Diabetes Paternal Grandmother     Hypertension Paternal Grandmother        Social History:  Marital Status:   [4]  Social History     Tobacco Use    Smoking status: Every Day     Current packs/day: 1.00     Average packs/day: 1 pack/day for 7.0 years (7.0 ttl pk-yrs)     Types: Cigarettes    Smokeless tobacco: Never   Substance Use Topics    Alcohol use: Yes     Comment: occas    Drug use: No        Medications:    chlorhexidine (PERIDEX) 0.12 % solution  clindamycin (CLEOCIN) 150 MG capsule  lamoTRIgine (LAMICTAL) 200 MG tablet  LORazepam (ATIVAN) 0.5 MG tablet  azithromycin (ZITHROMAX) 250 MG tablet  docusate sodium (COLACE) 100 MG tablet  hydrOXYzine (ATARAX) 25 MG tablet  methylphenidate (RITALIN) 10 MG tablet  prazosin (MINIPRESS) 2 MG capsule      Review of Systems   Constitutional:  Negative for chills and fever.   HENT:  Positive for dental problem. Negative for drooling and trouble swallowing.    Respiratory:  Negative for shortness of breath.    Cardiovascular:  Negative for chest pain.   Gastrointestinal:  Negative for diarrhea, nausea and vomiting.   Musculoskeletal:  Negative for neck pain and neck stiffness.   Psychiatric/Behavioral: Negative.       Physical Exam   BP: 98/66  Pulse: 78  Temp: 97.1  F (36.2  C)  Resp: 16  SpO2: 99 %    Physical Exam  Vitals and nursing note reviewed.   Constitutional:       General: She is not in acute distress.     Appearance: Normal appearance. She is not ill-appearing or toxic-appearing.   HENT:      Head:      Jaw: There is normal jaw occlusion.      Right Ear: Tympanic membrane, ear canal and external ear normal.      Left Ear: Tympanic  membrane, ear canal and external ear normal.      Nose: Nose normal.      Mouth/Throat:      Mouth: Mucous membranes are moist.      Dentition: Abnormal dentition. Dental tenderness and gingival swelling present. No dental abscesses.      Pharynx: Oropharynx is clear.      Comments: Overall dentition poor.  Dental pain to tooth #11, large portion of tooth missing/decayed.  Gingival swelling surrounding tooth.  No obvious abscess.  No trismus.  No difficulty swallowing.  No neck pain or stiffness.  Cardiovascular:      Rate and Rhythm: Normal rate and regular rhythm.      Pulses: Normal pulses.      Heart sounds: Normal heart sounds.   Pulmonary:      Effort: Pulmonary effort is normal.      Breath sounds: Normal breath sounds.   Neurological:      Mental Status: She is alert.   Psychiatric:         Mood and Affect: Mood normal.       ED Course     Procedures    No results found for this or any previous visit (from the past 24 hours).    Medications - No data to display    Assessments & Plan (with Medical Decision Making)     I have reviewed the nursing notes.    I have reviewed the findings, diagnosis, plan and need for follow up with the patient.  (K08.89) Pain, dental  (primary encounter diagnosis)  Plan:   Patient ambulatory with a nontoxic appearance.  Arrived with complaints of dental pain.  Overall dentition poor.  Dental pain to tooth #11, large portion of tooth missing/decayed.  Gingival swelling surrounding tooth.  No obvious abscess.  No trismus.  No difficulty swallowing.  No neck pain or stiffness.  Patient to start clindamycin as ordered for dental pain, on allergy list for nausea, states had no issue last time she took it 3/2025.  Take Peridex mouthwash as ordered.  Follow-up with a dentist for further evaluation.  Alternate Tylenol and ibuprofen as needed for pain.  Follow-up with primary care provider or return to urgent care/ED with any worsening in condition or additional concerns.  Patient in  agreement treatment plan.    New Prescriptions    CHLORHEXIDINE (PERIDEX) 0.12 % SOLUTION    Swish and spit 15 mLs in mouth 2 times daily for 7 days.    CLINDAMYCIN (CLEOCIN) 150 MG CAPSULE    Take 3 capsules (450 mg) by mouth 3 times daily for 7 days.     Final diagnoses:   Pain, dental     7/10/2025   [facility]       Gracy Davenport NP  07/10/25 4748

## 2025-07-10 NOTE — ED NOTES
Pt complains of dental pain, intermittent. States this has been a reoccurring issue where the same site in the upper left of her mouth gets infected. Pt states she has just been unable to get in for dental care to get it fixed for an insurance/payment/financial type of reason.

## 2025-07-10 NOTE — ED NOTES
AVS printed and reviewed with pt. Reviewed ordered abx and mouth rinse along with follow up recommendation. Pt denies any further questions or concerns. Discharged at 1623.

## 2025-07-26 ENCOUNTER — HOSPITAL ENCOUNTER (EMERGENCY)
Facility: HOSPITAL | Age: 30
Discharge: HOME | End: 2025-07-27
Attending: STUDENT IN AN ORGANIZED HEALTH CARE EDUCATION/TRAINING PROGRAM

## 2025-07-26 VITALS
DIASTOLIC BLOOD PRESSURE: 80 MMHG | SYSTOLIC BLOOD PRESSURE: 124 MMHG | HEART RATE: 84 BPM | WEIGHT: 107 LBS | BODY MASS INDEX: 19.57 KG/M2 | OXYGEN SATURATION: 100 % | RESPIRATION RATE: 16 BRPM | TEMPERATURE: 97.6 F

## 2025-07-26 DIAGNOSIS — K04.7 DENTAL INFECTION: Primary | ICD-10-CM

## 2025-07-26 PROCEDURE — 99283 EMERGENCY DEPT VISIT LOW MDM: CPT | Performed by: STUDENT IN AN ORGANIZED HEALTH CARE EDUCATION/TRAINING PROGRAM

## 2025-07-26 RX ORDER — CEFDINIR 300 MG/1
300 CAPSULE ORAL 2 TIMES DAILY
Qty: 28 CAPSULE | Refills: 0 | Status: SHIPPED | OUTPATIENT
Start: 2025-07-26 | End: 2025-08-09

## 2025-07-27 NOTE — DISCHARGE INSTRUCTIONS
Return to the emergency department for worsening symptoms or new concerning symptoms.  Continue taking your clindamycin.  I have added cefdinir in the hope to broaden your coverage.  Please follow-up with an oral surgeon/dentist as soon as possible

## 2025-07-27 NOTE — ED PROVIDER NOTES
[facility]  ED Provider Note    Chief Complaint   Patient presents with    Dental Pain    Oral Swelling     History:  Gerald Evans is a 30 year old female with dental pain and oral swelling from a broken tooth in her left upper jaw.  She has been forgetting doses of her clindamycin which seems to initially have been helping but after she started taking it intermittently some of the oral swelling is back.  She has no other complaints at this time    Review of Systems   Performed; see HPI for pertinent positives and negatives.     Medical history, surgical history, and social history was reviewed.  Nursing documentation, triage note, and vitals were reviewed.    Vitals:  BP: 124/80  Pulse: 84  Temp: 97.6  F (36.4  C)  Resp: 16  Weight: 48.5 kg (107 lb)  SpO2: 100 %    Physical Exam:  Constitutional: Alert and conversant. NAD   HENT: NCAT, poor dentition, severely eroded tooth at position 12. No palpable apical abscess  Eyes: Normal pupils   Neck: supple   CV: No pallor  Pulmonary/Chest: Non-labored respirations  Abdominal: non-distended   MSK: MENDIOLA.   Neuro: Alert and appropriate   Skin: Warm and dry. No diaphoresis. No rashes on exposed skin    Psych: Appropriate mood and affect       MDM:      ED Course as of 07/27/25 0004   Sun Jul 27, 2025   0003 Gerald Evans is a 30 year old female presenting with dental pain. Differential includes dental caries, apical abscess, dental abscess, tooth fracture, peritonsillar abscess, gingivitis, pericoronitis, maxillary sinusitis. History and exam is most suggestive of dental caries or apical abscess. There is no fluctuance suggestive of a dental buccal or lingual alveolar abscess. Exam is inconsistent with acute tooth fracture, gingival pathology, or posterior oropharynx soft tissue abscess, nahum's angina. Given the possibility of apical abscess, a trial of antibiotics is warranted and the patient is stable for discharge with OTC pain control and close  follow up with a dentist. Patient given instructions on follow-up and warning signs for which to return to ED. All questions were answered and the patient is comfortable with plan for discharge. The patient was discharged in stable condition. Return precautions given        Procedures:  Procedures        Impression:  Final diagnoses:   Dental infection            Sohan Wu MD  07/27/25 0004

## 2025-07-27 NOTE — ED TRIAGE NOTES
Patient presents with family members with c/o oral swelling from a broken tooth in her left upper jaw. Patient is taking clindamycin as it is, but states that she is forgetting the midday dose and is concerned because her oral swelling is still happening.      Triage Assessment (Adult)       Row Name 07/26/25 4502          Respiratory WDL    Respiratory WDL WDL        Cognitive/Neuro/Behavioral WDL    Cognitive/Neuro/Behavioral WDL WDL

## 2025-08-07 ENCOUNTER — OFFICE VISIT (OUTPATIENT)
Dept: FAMILY MEDICINE | Facility: OTHER | Age: 30
End: 2025-08-07
Attending: NURSE PRACTITIONER

## 2025-08-07 VITALS
DIASTOLIC BLOOD PRESSURE: 62 MMHG | WEIGHT: 102.5 LBS | HEART RATE: 80 BPM | BODY MASS INDEX: 18.75 KG/M2 | OXYGEN SATURATION: 99 % | TEMPERATURE: 98.2 F | SYSTOLIC BLOOD PRESSURE: 95 MMHG | RESPIRATION RATE: 20 BRPM

## 2025-08-07 DIAGNOSIS — M25.512 CHRONIC LEFT SHOULDER PAIN: ICD-10-CM

## 2025-08-07 DIAGNOSIS — G89.29 CHRONIC LEFT SHOULDER PAIN: ICD-10-CM

## 2025-08-07 DIAGNOSIS — K04.7 TOOTH INFECTION: Primary | ICD-10-CM

## 2025-08-07 DIAGNOSIS — M67.431 GANGLION CYST OF WRIST, RIGHT: ICD-10-CM

## 2025-08-07 DIAGNOSIS — F33.1 MODERATE RECURRENT MAJOR DEPRESSION (H): ICD-10-CM

## 2025-08-07 PROBLEM — Z87.59 HISTORY OF ECTOPIC PREGNANCY: Status: RESOLVED | Noted: 2018-04-30 | Resolved: 2025-08-07

## 2025-08-07 PROBLEM — R87.613 HSIL (HIGH GRADE SQUAMOUS INTRAEPITHELIAL LESION) ON PAP SMEAR OF CERVIX: Status: RESOLVED | Noted: 2021-08-11 | Resolved: 2025-08-07

## 2025-08-07 PROBLEM — F41.1 GENERALIZED ANXIETY DISORDER: Status: ACTIVE | Noted: 2025-08-07

## 2025-08-07 PROBLEM — N36.8 SKENE'S DUCT CYST: Chronic | Status: RESOLVED | Noted: 2018-07-26 | Resolved: 2025-08-07

## 2025-08-07 PROBLEM — R87.612 PAPANICOLAOU SMEAR OF CERVIX WITH LOW GRADE SQUAMOUS INTRAEPITHELIAL LESION (LGSIL): Status: RESOLVED | Noted: 2018-05-07 | Resolved: 2025-08-07

## 2025-08-07 PROBLEM — N94.6 DYSMENORRHEA: Status: RESOLVED | Noted: 2018-04-30 | Resolved: 2025-08-07

## 2025-08-07 PROBLEM — N94.10 DYSPAREUNIA IN FEMALE: Status: RESOLVED | Noted: 2018-04-30 | Resolved: 2025-08-07

## 2025-08-07 PROBLEM — N92.0 MENORRHAGIA: Status: RESOLVED | Noted: 2022-03-16 | Resolved: 2025-08-07

## 2025-08-07 PROBLEM — Z98.891 HISTORY OF C-SECTION: Status: RESOLVED | Noted: 2018-04-30 | Resolved: 2025-08-07

## 2025-08-07 PROBLEM — N87.9 CERVICAL DYSPLASIA: Status: RESOLVED | Noted: 2022-03-16 | Resolved: 2025-08-07

## 2025-08-07 PROCEDURE — G2211 COMPLEX E/M VISIT ADD ON: HCPCS | Performed by: NURSE PRACTITIONER

## 2025-08-07 PROCEDURE — 99214 OFFICE O/P EST MOD 30 MIN: CPT | Performed by: NURSE PRACTITIONER

## 2025-08-07 RX ORDER — CLINDAMYCIN HYDROCHLORIDE 300 MG/1
300 CAPSULE ORAL 3 TIMES DAILY
Qty: 21 CAPSULE | Refills: 0 | Status: SHIPPED | OUTPATIENT
Start: 2025-08-07 | End: 2025-08-14

## 2025-08-07 ASSESSMENT — ANXIETY QUESTIONNAIRES
GAD7 TOTAL SCORE: 5
7. FEELING AFRAID AS IF SOMETHING AWFUL MIGHT HAPPEN: SEVERAL DAYS
5. BEING SO RESTLESS THAT IT IS HARD TO SIT STILL: NOT AT ALL
IF YOU CHECKED OFF ANY PROBLEMS ON THIS QUESTIONNAIRE, HOW DIFFICULT HAVE THESE PROBLEMS MADE IT FOR YOU TO DO YOUR WORK, TAKE CARE OF THINGS AT HOME, OR GET ALONG WITH OTHER PEOPLE: SOMEWHAT DIFFICULT
3. WORRYING TOO MUCH ABOUT DIFFERENT THINGS: SEVERAL DAYS
1. FEELING NERVOUS, ANXIOUS, OR ON EDGE: SEVERAL DAYS
6. BECOMING EASILY ANNOYED OR IRRITABLE: NOT AT ALL
2. NOT BEING ABLE TO STOP OR CONTROL WORRYING: SEVERAL DAYS
GAD7 TOTAL SCORE: 5
4. TROUBLE RELAXING: SEVERAL DAYS

## 2025-08-07 ASSESSMENT — PATIENT HEALTH QUESTIONNAIRE - PHQ9: SUM OF ALL RESPONSES TO PHQ QUESTIONS 1-9: 9

## 2025-08-07 ASSESSMENT — PAIN SCALES - GENERAL: PAINLEVEL_OUTOF10: NO PAIN (0)

## 2025-08-09 PROBLEM — F10.21 HISTORY OF ALCOHOLISM (H): Status: RESOLVED | Noted: 2018-04-30 | Resolved: 2025-08-09

## (undated) DEVICE — CAUTERY-BALL 5MM LONG SHAFT

## (undated) DEVICE — APPLICATOR-CHLORAPREP 26ML TINTED CHG 2%+ 70% IPA-SURGICAL

## (undated) DEVICE — DRSG-SPONGE X-RAY 4 X 4

## (undated) DEVICE — TROCAR-5X100MM BLADED W/FIXATION

## (undated) DEVICE — SANITARY NAPKINS-SINGLE

## (undated) DEVICE — IRRIGATION-NACL 1000ML

## (undated) DEVICE — TRAY-SKIN PREP POVIDONE/IODINE

## (undated) DEVICE — SUCTION TUBE-YANKAUR

## (undated) DEVICE — CORD-LAPAROSCOPIC MONOPOLAR-DISPOSABLE

## (undated) DEVICE — BLADE-SCALPEL #15

## (undated) DEVICE — BIN-UROLOGY / CYSTO

## (undated) DEVICE — CAUTERY PENCIL-SMOKE EVACUATION

## (undated) DEVICE — GLV-8.5 BIOGEL LATEX

## (undated) DEVICE — CANISTER-SUCTION 2000CC

## (undated) DEVICE — TUBING-INSUFFLATION/LAPAROFLATOR W/FILTER

## (undated) DEVICE — SUTURE-VICRYL 0 UR-6 J603H

## (undated) DEVICE — SYRINGE-ASEPTO IRRIGATION

## (undated) DEVICE — TUBING-SUCTION 20FT

## (undated) DEVICE — TUBING-SEECLEAR LAPAROSCOPIC SMOKE EVACUATION

## (undated) DEVICE — LABEL-STERILE PREPRINTED FOR OR

## (undated) DEVICE — BLANKET-BAIR UPPER BODY

## (undated) DEVICE — NDL-INSUFFLATION 120MM

## (undated) DEVICE — SET-TUR Y-TYPE BLADDER IRRIGATION

## (undated) DEVICE — LIGHT HANDLE COVER FOR SKYTRON LIGHTS

## (undated) DEVICE — PRESSURE INFUSOR DISP. 3000CC

## (undated) DEVICE — IRRIGATION-H2O 1000ML

## (undated) DEVICE — CAUTERY-LEEP SQUARE 1.0 X 1.0CM X 12CM SHAFT

## (undated) DEVICE — TOPICAL SKIN ADHESIVE EXOFIN

## (undated) DEVICE — SUTURE-VICRYL 0 CT-1 J346H

## (undated) DEVICE — TROCAR-8X100MM OPTICAL BLADELESS

## (undated) DEVICE — SUTURE-VICRYL 2-0 CT-1 J945H

## (undated) DEVICE — BLADE-SURG CLIPPER

## (undated) DEVICE — BLADE-SCALPEL #11

## (undated) DEVICE — LIGASURE-5MM BLUNT TIP LAPAROSCOPIC

## (undated) DEVICE — CLEARIFY VISUALIZATION SYSTEM (SCOPE WARMER)

## (undated) DEVICE — DRSG-NON ADHERING 3 X 8 TELFA

## (undated) DEVICE — CAUTERY PAD-POLYHESIVE II ADULT

## (undated) DEVICE — CATH-URETHRAL 14FR

## (undated) DEVICE — DRAPE-THREE QUARTER (LARGE) SHEET

## (undated) DEVICE — DRAPE-STERI 45X60CM #1010

## (undated) DEVICE — SOL-NACL 0.9% 1000ML

## (undated) DEVICE — POUCH-INSTRUMENT 2 COMP. 7 X 11IN

## (undated) DEVICE — CAUTERY-LEEP 2.0CM X 1.5CM

## (undated) DEVICE — IRRIGATION-NACL 3000ML (BAG)

## (undated) DEVICE — PACK-GYN CYSTO-CUSTOM

## (undated) DEVICE — SPATULA TIP FOR SUCTION IRRIGATION PROBE

## (undated) DEVICE — SYRINGE-10CC LUER LOCK

## (undated) DEVICE — SPONGE-LAPAROTOMY PADS 18 X 18

## (undated) DEVICE — TROCAR SLEEVE-KII 5X100MM

## (undated) DEVICE — SUTURE-VICRYL 0 CT-2 POP-OFF J727D

## (undated) DEVICE — PACK-BASIN SET-UP

## (undated) DEVICE — PACK-LAP LAVH-CUSTOM

## (undated) DEVICE — CATH TRAY-16FR METER W/STATLOCK LATEX]

## (undated) DEVICE — CAUTERY-EXTENDED 6.5" BLADE

## (undated) DEVICE — CAUTERY TIP CLEANER

## (undated) DEVICE — GLV-7.0 PROTEXIS PI CLASSIC LF/PF

## (undated) DEVICE — SUTURE-VICRYL 3-0 CT-2 J232H

## (undated) DEVICE — TRAY-SKIN PREP DRY

## (undated) DEVICE — UTERINE MANIPULATOR-KRONNER MANIPUJECTOR

## (undated) DEVICE — SUCTION-IRRIGATION STRYKEFLOW II (STRYKER)

## (undated) DEVICE — LIGASURE-IMPACT SEALER/DIVIDER

## (undated) DEVICE — CATH TRAY-14FR STRAIGHT

## (undated) DEVICE — SCD SLEEVE-KNEE REG.

## (undated) RX ORDER — LIDOCAINE HYDROCHLORIDE 20 MG/ML
INJECTION, SOLUTION EPIDURAL; INFILTRATION; INTRACAUDAL; PERINEURAL
Status: DISPENSED
Start: 2021-08-25

## (undated) RX ORDER — ONDANSETRON 2 MG/ML
INJECTION INTRAMUSCULAR; INTRAVENOUS
Status: DISPENSED
Start: 2022-03-16

## (undated) RX ORDER — ONDANSETRON 2 MG/ML
INJECTION INTRAMUSCULAR; INTRAVENOUS
Status: DISPENSED
Start: 2021-08-25

## (undated) RX ORDER — PROPOFOL 10 MG/ML
INJECTION, EMULSION INTRAVENOUS
Status: DISPENSED
Start: 2022-03-16

## (undated) RX ORDER — FENTANYL CITRATE 50 UG/ML
INJECTION, SOLUTION INTRAMUSCULAR; INTRAVENOUS
Status: DISPENSED
Start: 2022-03-16

## (undated) RX ORDER — DEXAMETHASONE SODIUM PHOSPHATE 10 MG/ML
INJECTION, SOLUTION INTRAMUSCULAR; INTRAVENOUS
Status: DISPENSED
Start: 2022-03-16

## (undated) RX ORDER — GLYCOPYRROLATE 0.2 MG/ML
INJECTION, SOLUTION INTRAMUSCULAR; INTRAVENOUS
Status: DISPENSED
Start: 2022-03-16

## (undated) RX ORDER — PROPOFOL 10 MG/ML
INJECTION, EMULSION INTRAVENOUS
Status: DISPENSED
Start: 2021-08-25

## (undated) RX ORDER — LIDOCAINE HYDROCHLORIDE 20 MG/ML
INJECTION, SOLUTION EPIDURAL; INFILTRATION; INTRACAUDAL; PERINEURAL
Status: DISPENSED
Start: 2022-03-16

## (undated) RX ORDER — NEOSTIGMINE METHYLSULFATE 1 MG/ML
VIAL (ML) INJECTION
Status: DISPENSED
Start: 2022-03-16

## (undated) RX ORDER — KETOROLAC TROMETHAMINE 30 MG/ML
INJECTION, SOLUTION INTRAMUSCULAR; INTRAVENOUS
Status: DISPENSED
Start: 2022-03-16

## (undated) RX ORDER — FENTANYL CITRATE 50 UG/ML
INJECTION, SOLUTION INTRAMUSCULAR; INTRAVENOUS
Status: DISPENSED
Start: 2021-08-25

## (undated) RX ORDER — DEXAMETHASONE SODIUM PHOSPHATE 10 MG/ML
INJECTION, SOLUTION INTRAMUSCULAR; INTRAVENOUS
Status: DISPENSED
Start: 2021-08-25